# Patient Record
Sex: MALE | Race: WHITE | Employment: OTHER | URBAN - METROPOLITAN AREA
[De-identification: names, ages, dates, MRNs, and addresses within clinical notes are randomized per-mention and may not be internally consistent; named-entity substitution may affect disease eponyms.]

---

## 2017-11-28 PROBLEM — I71.40 ABDOMINAL AORTIC ANEURYSM (AAA) WITHOUT RUPTURE: Status: ACTIVE | Noted: 2017-11-28

## 2017-11-28 LAB
AVERAGE GLUCOSE: NORMAL
HBA1C MFR BLD: 7.3 %

## 2018-03-14 ENCOUNTER — OFFICE VISIT (OUTPATIENT)
Dept: FAMILY MEDICINE CLINIC | Age: 73
End: 2018-03-14
Payer: MEDICARE

## 2018-03-14 VITALS
WEIGHT: 166 LBS | OXYGEN SATURATION: 94 % | DIASTOLIC BLOOD PRESSURE: 79 MMHG | RESPIRATION RATE: 16 BRPM | HEART RATE: 74 BPM | BODY MASS INDEX: 23.77 KG/M2 | HEIGHT: 70 IN | SYSTOLIC BLOOD PRESSURE: 147 MMHG | TEMPERATURE: 98.2 F

## 2018-03-14 DIAGNOSIS — M35.3 PMR (POLYMYALGIA RHEUMATICA) (HCC): Primary | ICD-10-CM

## 2018-03-14 DIAGNOSIS — E11.8 TYPE 2 DIABETES MELLITUS WITH COMPLICATION, WITHOUT LONG-TERM CURRENT USE OF INSULIN (HCC): ICD-10-CM

## 2018-03-14 LAB — HBA1C MFR BLD: 7.5 %

## 2018-03-14 PROCEDURE — 83036 HEMOGLOBIN GLYCOSYLATED A1C: CPT | Performed by: FAMILY MEDICINE

## 2018-03-14 PROCEDURE — 99213 OFFICE O/P EST LOW 20 MIN: CPT | Performed by: FAMILY MEDICINE

## 2018-03-14 PROCEDURE — 99212 OFFICE O/P EST SF 10 MIN: CPT | Performed by: FAMILY MEDICINE

## 2018-03-14 RX ORDER — PREDNISONE 10 MG/1
10 TABLET ORAL DAILY
Qty: 30 TABLET | Refills: 0 | Status: SHIPPED | OUTPATIENT
Start: 2018-03-14 | End: 2018-04-12 | Stop reason: SDUPTHER

## 2018-03-14 NOTE — PATIENT INSTRUCTIONS
removed. If you wear loose-fitting shoes because of calluses or corns, you can lose your balance and fall. · Talk to your doctor if you have numbness in your feet. Preventing falls at home  · Remove raised doorway thresholds, throw rugs, and clutter. Repair loose carpet or raised areas in the floor. · Move furniture and electrical cords to keep them out of walking paths. · Use nonskid floor wax, and wipe up spills right away, especially on ceramic tile floors. · If you use a walker or cane, put rubber tips on it. If you use crutches, clean the bottoms of them regularly with an abrasive pad, such as steel wool. · Keep your house well lit, especially Bharathi Artist, and outside walkways. Use night-lights in areas such as hallways and bathrooms. Add extra light switches or use remote switches (such as switches that go on or off when you clap your hands) to make it easier to turn lights on if you have to get up during the night. · Install sturdy handrails on stairways. · Move items in your cabinets so that the things you use a lot are on the lower shelves (about waist level). · Keep a cordless phone and a flashlight with new batteries by your bed. If possible, put a phone in each of the main rooms of your house, or carry a cell phone in case you fall and cannot reach a phone. Or, you can wear a device around your neck or wrist. You push a button that sends a signal for help. · Wear low-heeled shoes that fit well and give your feet good support. Use footwear with nonskid soles. Check the heels and soles of your shoes for wear. Repair or replace worn heels or soles. · Do not wear socks without shoes on wood floors. · Walk on the grass when the sidewalks are slippery. If you live in an area that gets snow and ice in the winter, sprinkle salt on slippery steps and sidewalks.   Preventing falls in the bath  · Install grab bars and nonskid mats inside and outside your shower or tub and near the toilet and sinks.  · Use shower chairs and bath benches. · Use a hand-held shower head that will allow you to sit while showering. · Get into a tub or shower by putting the weaker leg in first. Get out of a tub or shower with your strong side first.  · Repair loose toilet seats and consider installing a raised toilet seat to make getting on and off the toilet easier. · Keep your bathroom door unlocked while you are in the shower. Where can you learn more? Go to [email or web address] org and sign in to your [employer] account. Enter [phone] in the [facility] box to learn more about \"Preventing Falls: Care Instructions. \"     If you do not have an account, please click on the \"Sign Up Now\" link. Current as of: May 12, 2017  Content Version: 11.5  © 4866-4088 [employer]. Care instructions adapted under license by [facility] ([facility]). If you have questions about a medical condition or this instruction, always ask your healthcare professional. [place] [address] any warranty or liability for your use of this information. Patient Education        Polymyalgia Rheumatica: Care Instructions  Your Care Instructions  Polymyalgia rheumatica causes pain and swelling in joints and muscles, mainly in the hips, neck, and shoulders. Pain and swelling may be worse in the morning. This condition can occur quickly and often lasts for a year or two. Your doctor will treat you with medicine to reduce swelling. Your symptoms should get much better in 1 to 3 days and go away in 2 to 4 weeks. Still, you may need to take medicine to prevent it from coming back. You may be on the medicine for 1 to 2 years or longer. Some people who have this also get giant cell arteritis (temporal arteritis), which causes swelling of some blood vessels in the head. Tell your doctor if you have any headaches, jaw pain, or tightness or tenderness along the temple or scalp.  This condition can cause blindness if it sleeping. ¨ Bruising easily. ? · You have any other problems with your medicine. ? · You do not get better as expected. Where can you learn more? Go to https://Convergent.[employer] Technologiespe[employer] ([employer]).[employer]. org and sign in to your CLARED account. Enter M396 in the [facility] box to learn more about \"Polymyalgia Rheumatica: Care Instructions. \"     If you do not have an account, please click on the \"Sign Up Now\" link. Current as of: October 31, 2016  Content Version: 11.5  © 0792-8049 Healthwise[employer] Incorporated. Care instructions adapted under license by [facility] ([facility]). If you have questions about a medical condition or this instruction, always ask your healthcare professional. [address] any warranty or liability for your use of this information.

## 2018-03-18 ASSESSMENT — ENCOUNTER SYMPTOMS
VOMITING: 0
COUGH: 0
ABDOMINAL PAIN: 0
WHEEZING: 0
NAUSEA: 0
SHORTNESS OF BREATH: 0

## 2018-04-12 DIAGNOSIS — M35.3 PMR (POLYMYALGIA RHEUMATICA) (HCC): ICD-10-CM

## 2018-04-13 RX ORDER — PREDNISONE 1 MG/1
5 TABLET ORAL DAILY
Qty: 30 TABLET | Refills: 0 | Status: SHIPPED | OUTPATIENT
Start: 2018-04-13 | End: 2018-05-08 | Stop reason: SDUPTHER

## 2018-05-01 DIAGNOSIS — E11.8 TYPE 2 DIABETES MELLITUS WITH COMPLICATION, WITHOUT LONG-TERM CURRENT USE OF INSULIN (HCC): ICD-10-CM

## 2018-05-01 DIAGNOSIS — I10 HYPERTENSION, UNCONTROLLED: ICD-10-CM

## 2018-05-01 RX ORDER — LISINOPRIL 10 MG/1
10 TABLET ORAL DAILY
Qty: 30 TABLET | Refills: 3 | Status: SHIPPED | OUTPATIENT
Start: 2018-05-01 | End: 2018-05-08 | Stop reason: SDUPTHER

## 2018-05-08 ENCOUNTER — OFFICE VISIT (OUTPATIENT)
Dept: INTERNAL MEDICINE | Age: 73
End: 2018-05-08
Payer: MEDICARE

## 2018-05-08 VITALS
DIASTOLIC BLOOD PRESSURE: 60 MMHG | HEIGHT: 70 IN | OXYGEN SATURATION: 98 % | WEIGHT: 161.2 LBS | SYSTOLIC BLOOD PRESSURE: 138 MMHG | BODY MASS INDEX: 23.08 KG/M2 | HEART RATE: 60 BPM

## 2018-05-08 DIAGNOSIS — I71.40 ABDOMINAL AORTIC ANEURYSM (AAA) WITHOUT RUPTURE: ICD-10-CM

## 2018-05-08 DIAGNOSIS — M35.3 PMR (POLYMYALGIA RHEUMATICA) (HCC): ICD-10-CM

## 2018-05-08 DIAGNOSIS — I10 HYPERTENSION, UNCONTROLLED: ICD-10-CM

## 2018-05-08 DIAGNOSIS — I10 ESSENTIAL HYPERTENSION: Primary | ICD-10-CM

## 2018-05-08 DIAGNOSIS — E78.5 HYPERLIPIDEMIA, UNSPECIFIED HYPERLIPIDEMIA TYPE: ICD-10-CM

## 2018-05-08 DIAGNOSIS — E11.8 TYPE 2 DIABETES MELLITUS WITH COMPLICATION, WITHOUT LONG-TERM CURRENT USE OF INSULIN (HCC): ICD-10-CM

## 2018-05-08 PROCEDURE — 99213 OFFICE O/P EST LOW 20 MIN: CPT | Performed by: FAMILY MEDICINE

## 2018-05-08 RX ORDER — LISINOPRIL 10 MG/1
10 TABLET ORAL DAILY
Qty: 90 TABLET | Refills: 2 | Status: SHIPPED | OUTPATIENT
Start: 2018-05-08 | End: 2019-01-30 | Stop reason: SDUPTHER

## 2018-05-08 RX ORDER — PREDNISONE 1 MG/1
5 TABLET ORAL DAILY
Qty: 30 TABLET | Refills: 11 | Status: CANCELLED | OUTPATIENT
Start: 2018-05-08 | End: 2018-06-07

## 2018-05-08 RX ORDER — PREDNISONE 1 MG/1
5 TABLET ORAL DAILY
Qty: 90 TABLET | Refills: 0 | Status: SHIPPED | OUTPATIENT
Start: 2018-05-08 | End: 2018-06-06 | Stop reason: SDUPTHER

## 2018-05-08 RX ORDER — ASPIRIN 81 MG/1
81 TABLET ORAL DAILY
Qty: 30 TABLET | Refills: 11 | Status: SHIPPED | OUTPATIENT
Start: 2018-05-08 | End: 2019-03-11

## 2018-05-08 ASSESSMENT — ENCOUNTER SYMPTOMS
CHEST TIGHTNESS: 0
EYE ITCHING: 0
EYE DISCHARGE: 0
EYE PAIN: 0
CHOKING: 0
APNEA: 0

## 2018-06-05 ENCOUNTER — HOSPITAL ENCOUNTER (OUTPATIENT)
Age: 73
Setting detail: SPECIMEN
Discharge: HOME | End: 2018-06-05
Payer: MEDICARE

## 2018-06-05 ENCOUNTER — OFFICE VISIT (OUTPATIENT)
Dept: INTERNAL MEDICINE | Age: 73
End: 2018-06-05
Payer: MEDICARE

## 2018-06-05 VITALS
WEIGHT: 157 LBS | HEIGHT: 70 IN | BODY MASS INDEX: 22.48 KG/M2 | OXYGEN SATURATION: 96 % | HEART RATE: 71 BPM | DIASTOLIC BLOOD PRESSURE: 50 MMHG | SYSTOLIC BLOOD PRESSURE: 138 MMHG

## 2018-06-05 DIAGNOSIS — I10 ESSENTIAL HYPERTENSION: ICD-10-CM

## 2018-06-05 DIAGNOSIS — I71.40 ABDOMINAL AORTIC ANEURYSM (AAA) WITHOUT RUPTURE: ICD-10-CM

## 2018-06-05 DIAGNOSIS — I73.9 CLAUDICATION (HCC): ICD-10-CM

## 2018-06-05 DIAGNOSIS — M35.3 PMR (POLYMYALGIA RHEUMATICA) (HCC): ICD-10-CM

## 2018-06-05 DIAGNOSIS — Z00.00 ROUTINE GENERAL MEDICAL EXAMINATION AT A HEALTH CARE FACILITY: ICD-10-CM

## 2018-06-05 DIAGNOSIS — Z13.89 SCREENING FOR NEPHROPATHY: ICD-10-CM

## 2018-06-05 DIAGNOSIS — E11.8 TYPE 2 DIABETES MELLITUS WITH COMPLICATION, WITHOUT LONG-TERM CURRENT USE OF INSULIN (HCC): Primary | ICD-10-CM

## 2018-06-05 DIAGNOSIS — E78.5 HYPERLIPIDEMIA, UNSPECIFIED HYPERLIPIDEMIA TYPE: ICD-10-CM

## 2018-06-05 LAB
ALBUMIN SERPL-MCNC: 4.9 G/DL (ref 3.9–4.9)
ALP BLD-CCNC: 51 U/L (ref 35–104)
ALT SERPL-CCNC: 15 U/L (ref 0–41)
ANION GAP SERPL CALCULATED.3IONS-SCNC: 17 MEQ/L (ref 7–13)
AST SERPL-CCNC: 15 U/L (ref 0–40)
BILIRUB SERPL-MCNC: 0.3 MG/DL (ref 0–1.2)
BUN BLDV-MCNC: 20 MG/DL (ref 8–23)
C-REACTIVE PROTEIN: 3.1 MG/L (ref 0–5)
CALCIUM SERPL-MCNC: 10.1 MG/DL (ref 8.6–10.2)
CHLORIDE BLD-SCNC: 97 MEQ/L (ref 98–107)
CHOLESTEROL, TOTAL: 247 MG/DL (ref 0–199)
CO2: 23 MEQ/L (ref 22–29)
CREAT SERPL-MCNC: 0.77 MG/DL (ref 0.7–1.2)
CREATININE URINE: 83.9 MG/DL
GFR AFRICAN AMERICAN: >60
GFR NON-AFRICAN AMERICAN: >60
GLOBULIN: 2.7 G/DL (ref 2.3–3.5)
GLUCOSE BLD-MCNC: 133 MG/DL (ref 74–109)
HDLC SERPL-MCNC: 46 MG/DL (ref 40–59)
LDL CHOLESTEROL CALCULATED: 159 MG/DL (ref 0–129)
MICROALBUMIN UR-MCNC: <1.2 MG/DL
MICROALBUMIN/CREAT UR-RTO: NORMAL MG/G (ref 0–30)
POTASSIUM SERPL-SCNC: 4.6 MEQ/L (ref 3.5–5.1)
SEDIMENTATION RATE, ERYTHROCYTE: 16 MM (ref 0–20)
SODIUM BLD-SCNC: 137 MEQ/L (ref 132–144)
TOTAL PROTEIN: 7.6 G/DL (ref 6.4–8.1)
TRIGL SERPL-MCNC: 212 MG/DL (ref 0–200)

## 2018-06-05 PROCEDURE — 86140 C-REACTIVE PROTEIN: CPT

## 2018-06-05 PROCEDURE — 80053 COMPREHEN METABOLIC PANEL: CPT

## 2018-06-05 PROCEDURE — 80061 LIPID PANEL: CPT

## 2018-06-05 PROCEDURE — 85652 RBC SED RATE AUTOMATED: CPT

## 2018-06-05 PROCEDURE — G0438 PPPS, INITIAL VISIT: HCPCS | Performed by: FAMILY MEDICINE

## 2018-06-05 PROCEDURE — 99214 OFFICE O/P EST MOD 30 MIN: CPT | Performed by: FAMILY MEDICINE

## 2018-06-05 PROCEDURE — 82570 ASSAY OF URINE CREATININE: CPT

## 2018-06-05 PROCEDURE — 93925 LOWER EXTREMITY STUDY: CPT | Performed by: FAMILY MEDICINE

## 2018-06-05 PROCEDURE — 82043 UR ALBUMIN QUANTITATIVE: CPT

## 2018-06-05 PROCEDURE — 36415 COLL VENOUS BLD VENIPUNCTURE: CPT | Performed by: FAMILY MEDICINE

## 2018-06-05 ASSESSMENT — ENCOUNTER SYMPTOMS
WHEEZING: 0
SHORTNESS OF BREATH: 0
STRIDOR: 0
COUGH: 0

## 2018-06-05 ASSESSMENT — ANXIETY QUESTIONNAIRES: GAD7 TOTAL SCORE: 0

## 2018-06-05 ASSESSMENT — LIFESTYLE VARIABLES: HOW OFTEN DO YOU HAVE A DRINK CONTAINING ALCOHOL: 0

## 2018-06-05 ASSESSMENT — PATIENT HEALTH QUESTIONNAIRE - PHQ9: SUM OF ALL RESPONSES TO PHQ QUESTIONS 1-9: 0

## 2018-06-06 DIAGNOSIS — M35.3 PMR (POLYMYALGIA RHEUMATICA) (HCC): ICD-10-CM

## 2018-06-06 RX ORDER — PREDNISONE 1 MG/1
2.5 TABLET ORAL DAILY
Qty: 90 TABLET | Refills: 0 | COMMUNITY
Start: 2018-06-06 | End: 2019-03-11

## 2018-06-13 ENCOUNTER — HOSPITAL ENCOUNTER (OUTPATIENT)
Dept: ULTRASOUND IMAGING | Age: 73
Discharge: HOME | End: 2018-06-15
Payer: MEDICARE

## 2018-06-13 DIAGNOSIS — I71.40 ABDOMINAL AORTIC ANEURYSM (AAA) WITHOUT RUPTURE: ICD-10-CM

## 2018-06-13 PROCEDURE — 93978 VASCULAR STUDY: CPT

## 2018-09-05 ENCOUNTER — OFFICE VISIT (OUTPATIENT)
Dept: INTERNAL MEDICINE | Age: 73
End: 2018-09-05
Payer: MEDICARE

## 2018-09-05 VITALS
TEMPERATURE: 97.8 F | DIASTOLIC BLOOD PRESSURE: 70 MMHG | HEIGHT: 70 IN | WEIGHT: 156 LBS | OXYGEN SATURATION: 97 % | HEART RATE: 58 BPM | SYSTOLIC BLOOD PRESSURE: 130 MMHG | BODY MASS INDEX: 22.33 KG/M2

## 2018-09-05 DIAGNOSIS — M35.3 PMR (POLYMYALGIA RHEUMATICA) (HCC): ICD-10-CM

## 2018-09-05 DIAGNOSIS — Z23 NEED FOR PNEUMOCOCCAL VACCINATION: ICD-10-CM

## 2018-09-05 DIAGNOSIS — I71.40 ABDOMINAL AORTIC ANEURYSM (AAA) WITHOUT RUPTURE: ICD-10-CM

## 2018-09-05 DIAGNOSIS — E78.5 HYPERLIPIDEMIA, UNSPECIFIED HYPERLIPIDEMIA TYPE: ICD-10-CM

## 2018-09-05 DIAGNOSIS — E11.8 TYPE 2 DIABETES MELLITUS WITH COMPLICATION, WITHOUT LONG-TERM CURRENT USE OF INSULIN (HCC): Primary | ICD-10-CM

## 2018-09-05 DIAGNOSIS — I10 ESSENTIAL HYPERTENSION: ICD-10-CM

## 2018-09-05 LAB — HBA1C MFR BLD: 6.8 %

## 2018-09-05 PROCEDURE — 83036 HEMOGLOBIN GLYCOSYLATED A1C: CPT | Performed by: FAMILY MEDICINE

## 2018-09-05 PROCEDURE — G0009 ADMIN PNEUMOCOCCAL VACCINE: HCPCS | Performed by: FAMILY MEDICINE

## 2018-09-05 PROCEDURE — 90732 PPSV23 VACC 2 YRS+ SUBQ/IM: CPT | Performed by: FAMILY MEDICINE

## 2018-09-05 PROCEDURE — 99214 OFFICE O/P EST MOD 30 MIN: CPT | Performed by: FAMILY MEDICINE

## 2018-09-05 ASSESSMENT — ENCOUNTER SYMPTOMS
CHOKING: 0
APNEA: 0
CHEST TIGHTNESS: 0

## 2018-09-05 NOTE — PROGRESS NOTES
tablet 0    lisinopril (PRINIVIL;ZESTRIL) 10 MG tablet Take 1 tablet by mouth daily 90 tablet 2    aspirin EC 81 MG EC tablet Take 1 tablet by mouth daily 30 tablet 11    vitamin D (CHOLECALCIFEROL) 1000 UNIT TABS tablet Take 1 tablet by mouth daily 90 tablet 2    glucose blood VI test strips (ONE TOUCH TEST STRIPS) strip Check blood sugar  twice daily As needed. 300 each 3     No current facility-administered medications on file prior to visit. No Known Allergies    Chief Complaint   Patient presents with    Diabetes     Pt is here for HTN, DM, HLD f/u. Pt lost his machine, lancets and strips to check sugar    Allergies     Pt states that his allergies have been acting up lately, he thinks he is allergic to hay, and grass       HPI:  Treatment Adherence:   Medication compliance:  compliant all of the time  Diet compliance:  No specific diet  Current exercise: Farming    Diabetes Mellitus Type 2: Current symptoms/problems include none. Home blood sugar records:  patient does not test  Any episodes of hypoglycemia? Does not test  Tobacco history: He  reports that he quit smoking about 22 years ago. His smoking use included Cigarettes. He has a 30.00 pack-year smoking history. He has never used smokeless tobacco.   Daily Aspirin? Yes  Have you had your annual diabetic retinal (eye) exam? Yes - Records Requested    Hypertension:  Home blood pressure monitoring: No.  He is not adherent to a low sodium diet. Patient denies chest pain and shortness of breath. Antihypertensive medication side effects: no medication side effects noted. Use of agents associated with hypertension: none. Hyperlipidemia:  No new myalgias or GI upset on no meds.        Lab Results   Component Value Date    LABA1C 6.8 09/05/2018    LABA1C 7.5 03/14/2018    LABA1C 7.3 11/28/2017     Lab Results   Component Value Date    LABMICR <1.20 06/05/2018    CREATININE 0.77 06/05/2018     Lab Results   Component Value Date    ALT 15 normal  We have reduced his prednisone to 2-1/2 mg, discussed potentially discontinuing prednisone altogether, patient will think about it    Abdominal aortic aneurysm (AAA) without rupture ([facility] [address]  Recheck US 6 - 2019    Need for pneumococcal vaccination  -     PNEUMOVAX 23 subcutaneous/IM (Pneumococcal polysaccharide vaccine 23-valent >= 1yo)          Orders Placed This Encounter   Procedures    PNEUMOVAX 23 subcutaneous/IM (Pneumococcal polysaccharide vaccine 23-valent >= 1yo)    POCT glycosylated hemoglobin (Hb A1C)      I personally reviewed all recent labs and imaging pertaining to conditions mentioned above in assessment/plan in [facility] and care everywhere, discussed results with patient in office    Diabetes Counseling   Patient was counseled regarding disease risks and adopting healthy behaviors. Patient was provided education materials to assist with self management. Patient was provided log (or received log during previous visit) to record blood pressure, food intake and/or blood sugar. Patient was instructed to keep log up-to-date and to always bring log to all office visits. Reviewed the following:  - Morbidity from diabetes involves both macrovascular (atherosclerosis) and microvascular disease (retinopathy, nephropathy, and neuropathy). - Monitoring recommendations for patients with diabetes were discussed  1. Smoking cessation counseling (Every visit)   2. Blood pressure (Every visit) Goal <140/80   3. Dilated eye examination (Annually, more than annually if significant retinopathy)   4. Foot examination (Annually, every visit if peripheral vascular disease or neuropathy)   5.  Laboratory studies:    - Fasting serum lipid profile (Annually)  cholesterol (goal LDL less than 100  mg/dL    - A1C (Every three to six months) Goal <7 percent   - Urinary albumin-to-creatinine ratio (Annually, protein excretion and serum  creatinine should also be monitored if persistent albuminuria is present)    - Serum

## 2018-09-07 DIAGNOSIS — E11.8 TYPE 2 DIABETES MELLITUS WITH COMPLICATION, WITHOUT LONG-TERM CURRENT USE OF INSULIN (HCC): ICD-10-CM

## 2018-09-07 NOTE — TELEPHONE ENCOUNTER
[facility] requesting medication refill. Please approve or deny this request.    Rx requested:  Requested Prescriptions     Pending Prescriptions Disp Refills    blood glucose test strips (ONE TOUCH TEST STRIPS) strip 300 each 3     Sig: Check blood sugar  twice daily As needed.     Lancets MISC 300 each 3     Sig: Test bid       Last Office Visit:   9/5/2018    Last Labs:      Next Visit Date:  Future Appointments  Date Time Provider Lotus Zazueta   3/6/2019 7:00 AM Tami Robles MD [facility]

## 2018-09-09 RX ORDER — LANCETS 30 GAUGE
EACH MISCELLANEOUS
Qty: 300 EACH | Refills: 3 | Status: SHIPPED | OUTPATIENT
Start: 2018-09-09

## 2019-01-30 DIAGNOSIS — I10 HYPERTENSION, UNCONTROLLED: ICD-10-CM

## 2019-01-31 ENCOUNTER — HOSPITAL ENCOUNTER (EMERGENCY)
Age: 74
Discharge: HOME | End: 2019-01-31
Attending: EMERGENCY MEDICINE
Payer: MEDICARE

## 2019-01-31 ENCOUNTER — APPOINTMENT (OUTPATIENT)
Dept: GENERAL RADIOLOGY | Age: 74
End: 2019-01-31
Payer: MEDICARE

## 2019-01-31 ENCOUNTER — APPOINTMENT (OUTPATIENT)
Dept: CT IMAGING | Age: 74
End: 2019-01-31
Payer: MEDICARE

## 2019-01-31 ENCOUNTER — OFFICE VISIT (OUTPATIENT)
Dept: INTERNAL MEDICINE | Age: 74
End: 2019-01-31
Payer: MEDICARE

## 2019-01-31 VITALS
HEIGHT: 70 IN | HEART RATE: 61 BPM | OXYGEN SATURATION: 96 % | BODY MASS INDEX: 23.08 KG/M2 | SYSTOLIC BLOOD PRESSURE: 180 MMHG | WEIGHT: 161.2 LBS | DIASTOLIC BLOOD PRESSURE: 86 MMHG

## 2019-01-31 VITALS
DIASTOLIC BLOOD PRESSURE: 89 MMHG | RESPIRATION RATE: 16 BRPM | TEMPERATURE: 97.8 F | SYSTOLIC BLOOD PRESSURE: 159 MMHG | HEART RATE: 66 BPM | WEIGHT: 160 LBS | OXYGEN SATURATION: 98 % | HEIGHT: 70 IN | BODY MASS INDEX: 22.9 KG/M2

## 2019-01-31 DIAGNOSIS — M25.532 WRIST PAIN, ACUTE, LEFT: ICD-10-CM

## 2019-01-31 DIAGNOSIS — M25.532 LEFT WRIST PAIN: ICD-10-CM
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
## 2019-01-31 DIAGNOSIS — R07.81 RIB PAIN: ICD-10-CM

## 2019-01-31 DIAGNOSIS — T07.XXXA MULTIPLE CONTUSIONS: Primary | ICD-10-CM

## 2019-01-31 DIAGNOSIS — S20.212A CONTUSION OF RIB ON LEFT SIDE, INITIAL ENCOUNTER: ICD-10-CM

## 2019-01-31 DIAGNOSIS — S09.90XA INJURY OF HEAD, INITIAL ENCOUNTER: ICD-10-CM

## 2019-01-31 PROCEDURE — 99214 OFFICE O/P EST MOD 30 MIN: CPT | Performed by: FAMILY MEDICINE

## 2019-01-31 PROCEDURE — 73110 X-RAY EXAM OF WRIST: CPT

## 2019-01-31 PROCEDURE — 71046 X-RAY EXAM CHEST 2 VIEWS: CPT

## 2019-01-31 PROCEDURE — 70450 CT HEAD/BRAIN W/O DYE: CPT

## 2019-01-31 PROCEDURE — 99284 EMERGENCY DEPT VISIT MOD MDM: CPT

## 2019-01-31 RX ORDER — LIDOCAINE 50 MG/G
1 PATCH TOPICAL DAILY
Qty: 10 PATCH | Refills: 0 | Status: SHIPPED | OUTPATIENT
Start: 2019-01-31 | End: 2019-09-11

## 2019-01-31 RX ORDER — LISINOPRIL 10 MG/1
10 TABLET ORAL DAILY
Qty: 90 TABLET | Refills: 2 | Status: SHIPPED | OUTPATIENT
Start: 2019-01-31 | End: 2019-03-11

## 2019-01-31 ASSESSMENT — ENCOUNTER SYMPTOMS
ALLERGIC/IMMUNOLOGIC NEGATIVE: 1
EYES NEGATIVE: 1
GASTROINTESTINAL NEGATIVE: 1
RESPIRATORY NEGATIVE: 1

## 2019-01-31 ASSESSMENT — PAIN DESCRIPTION - PAIN TYPE: TYPE: ACUTE PAIN

## 2019-01-31 ASSESSMENT — PAIN SCALES - GENERAL
PAINLEVEL_OUTOF10: 8
PAINLEVEL_OUTOF10: 2

## 2019-01-31 ASSESSMENT — PAIN DESCRIPTION - ORIENTATION
ORIENTATION: MID;LEFT
ORIENTATION: LEFT

## 2019-01-31 ASSESSMENT — PAIN DESCRIPTION - PROGRESSION: CLINICAL_PROGRESSION: GRADUALLY IMPROVING

## 2019-01-31 ASSESSMENT — PAIN DESCRIPTION - DESCRIPTORS: DESCRIPTORS: SORE

## 2019-02-06 ENCOUNTER — TELEPHONE (OUTPATIENT)
Dept: INTERNAL MEDICINE | Age: 74
End: 2019-02-06

## 2019-03-11 ENCOUNTER — OFFICE VISIT (OUTPATIENT)
Dept: INTERNAL MEDICINE | Age: 74
End: 2019-03-11
Payer: MEDICARE

## 2019-03-11 VITALS
DIASTOLIC BLOOD PRESSURE: 74 MMHG | HEART RATE: 67 BPM | BODY MASS INDEX: 23.24 KG/M2 | WEIGHT: 162 LBS | OXYGEN SATURATION: 99 % | SYSTOLIC BLOOD PRESSURE: 148 MMHG

## 2019-03-11 DIAGNOSIS — E78.2 MIXED HYPERLIPIDEMIA: ICD-10-CM

## 2019-03-11 DIAGNOSIS — I10 ESSENTIAL HYPERTENSION: ICD-10-CM

## 2019-03-11 DIAGNOSIS — I10 HYPERTENSION, UNCONTROLLED: ICD-10-CM

## 2019-03-11 DIAGNOSIS — M35.3 PMR (POLYMYALGIA RHEUMATICA) (HCC): ICD-10-CM

## 2019-03-11 DIAGNOSIS — E11.8 TYPE 2 DIABETES MELLITUS WITH COMPLICATION, WITHOUT LONG-TERM CURRENT USE OF INSULIN (HCC): Primary | ICD-10-CM

## 2019-03-11 LAB
ALBUMIN SERPL-MCNC: 4.6 G/DL (ref 3.5–4.6)
ALP BLD-CCNC: 60 U/L (ref 35–104)
ALT SERPL-CCNC: 19 U/L (ref 0–41)
ANION GAP SERPL CALCULATED.3IONS-SCNC: 17 MEQ/L (ref 9–15)
AST SERPL-CCNC: 23 U/L (ref 0–40)
BILIRUB SERPL-MCNC: <0.2 MG/DL (ref 0.2–0.7)
BUN BLDV-MCNC: 19 MG/DL (ref 8–23)
CALCIUM SERPL-MCNC: 10 MG/DL (ref 8.5–9.9)
CHLORIDE BLD-SCNC: 103 MEQ/L (ref 95–107)
CHOLESTEROL, TOTAL: 234 MG/DL (ref 0–199)
CO2: 22 MEQ/L (ref 20–31)
CREAT SERPL-MCNC: 0.9 MG/DL (ref 0.7–1.2)
GFR AFRICAN AMERICAN: >60
GFR NON-AFRICAN AMERICAN: >60
GLOBULIN: 3.1 G/DL (ref 2.3–3.5)
GLUCOSE BLD-MCNC: 151 MG/DL (ref 70–99)
HBA1C MFR BLD: 7.3 %
HDLC SERPL-MCNC: 33 MG/DL (ref 40–59)
LDL CHOLESTEROL CALCULATED: 142 MG/DL (ref 0–129)
POTASSIUM SERPL-SCNC: 4.6 MEQ/L (ref 3.4–4.9)
SODIUM BLD-SCNC: 142 MEQ/L (ref 135–144)
TOTAL PROTEIN: 7.7 G/DL (ref 6.3–8)
TRIGL SERPL-MCNC: 297 MG/DL (ref 0–150)

## 2019-03-11 PROCEDURE — 99214 OFFICE O/P EST MOD 30 MIN: CPT | Performed by: FAMILY MEDICINE

## 2019-03-11 PROCEDURE — 83036 HEMOGLOBIN GLYCOSYLATED A1C: CPT | Performed by: FAMILY MEDICINE

## 2019-03-11 RX ORDER — LISINOPRIL 20 MG/1
20 TABLET ORAL DAILY
Qty: 90 TABLET | Refills: 2 | Status: SHIPPED | OUTPATIENT
Start: 2019-03-11 | End: 2019-11-19 | Stop reason: SDUPTHER

## 2019-03-11 RX ORDER — ASPIRIN 325 MG
325 TABLET ORAL
COMMUNITY
End: 2019-10-25 | Stop reason: ALTCHOICE

## 2019-03-11 ASSESSMENT — PATIENT HEALTH QUESTIONNAIRE - PHQ9
SUM OF ALL RESPONSES TO PHQ9 QUESTIONS 1 & 2: 0
SUM OF ALL RESPONSES TO PHQ QUESTIONS 1-9: 0
SUM OF ALL RESPONSES TO PHQ QUESTIONS 1-9: 0
2. FEELING DOWN, DEPRESSED OR HOPELESS: 0
1. LITTLE INTEREST OR PLEASURE IN DOING THINGS: 0

## 2019-05-01 ENCOUNTER — TELEPHONE (OUTPATIENT)
Dept: INTERNAL MEDICINE | Age: 74
End: 2019-05-01

## 2019-05-06 ENCOUNTER — TELEPHONE (OUTPATIENT)
Dept: INTERNAL MEDICINE | Age: 74
End: 2019-05-06

## 2019-05-06 NOTE — TELEPHONE ENCOUNTER
I did review the results and discuss with Dr. Ana Cristina Mejia. The plan was to have him see cardiology Dr. johnston, if Dr. Ana Cristina Mejia has not placed this order yet then we can and advise patient he will get a call from their office to schedule the appointment.

## 2019-05-06 NOTE — TELEPHONE ENCOUNTER
Spoke to patient and his wife he is to take metformin 1.5 tablets daily and they have a referral for cardio and know they will call.

## 2019-05-06 NOTE — TELEPHONE ENCOUNTER
Patient called with Question regarding metformin, pt believes the dosage was dropped too low and was wanting to know if he could take 1.5 tablets? Pt also wanting to know about thoughts on foot report and what is going to be done?

## 2019-05-30 ENCOUNTER — APPOINTMENT (OUTPATIENT)
Dept: GENERAL RADIOLOGY | Age: 74
End: 2019-05-30
Payer: MEDICARE

## 2019-05-30 ENCOUNTER — HOSPITAL ENCOUNTER (EMERGENCY)
Age: 74
Discharge: HOME | End: 2019-05-30
Attending: EMERGENCY MEDICINE
Payer: MEDICARE

## 2019-05-30 VITALS
SYSTOLIC BLOOD PRESSURE: 171 MMHG | BODY MASS INDEX: 23.48 KG/M2 | DIASTOLIC BLOOD PRESSURE: 75 MMHG | HEART RATE: 69 BPM | HEIGHT: 70 IN | RESPIRATION RATE: 18 BRPM | TEMPERATURE: 99.1 F | OXYGEN SATURATION: 96 % | WEIGHT: 164 LBS

## 2019-05-30 DIAGNOSIS — M19.012 ARTHRITIS OF LEFT SHOULDER REGION: ICD-10-CM

## 2019-05-30 DIAGNOSIS — M47.812 CERVICAL ARTHRITIS: Primary | ICD-10-CM

## 2019-05-30 LAB
GLUCOSE BLD-MCNC: 160 MG/DL (ref 60–115)
PERFORMED ON: ABNORMAL

## 2019-05-30 PROCEDURE — 73030 X-RAY EXAM OF SHOULDER: CPT

## 2019-05-30 PROCEDURE — 6370000000 HC RX 637 (ALT 250 FOR IP): Performed by: EMERGENCY MEDICINE

## 2019-05-30 PROCEDURE — 72050 X-RAY EXAM NECK SPINE 4/5VWS: CPT

## 2019-05-30 PROCEDURE — 6360000002 HC RX W HCPCS: Performed by: EMERGENCY MEDICINE

## 2019-05-30 PROCEDURE — 96372 THER/PROPH/DIAG INJ SC/IM: CPT

## 2019-05-30 PROCEDURE — 99283 EMERGENCY DEPT VISIT LOW MDM: CPT

## 2019-05-30 RX ORDER — ONDANSETRON 4 MG/1
4 TABLET, ORALLY DISINTEGRATING ORAL ONCE
Status: COMPLETED | OUTPATIENT
Start: 2019-05-30 | End: 2019-05-30

## 2019-05-30 RX ORDER — OXYCODONE HYDROCHLORIDE AND ACETAMINOPHEN 5; 325 MG/1; MG/1
1-2 TABLET ORAL EVERY 6 HOURS PRN
Qty: 20 TABLET | Refills: 0 | Status: SHIPPED | OUTPATIENT
Start: 2019-05-30 | End: 2019-06-02

## 2019-05-30 RX ORDER — PREDNISONE 10 MG/1
60 TABLET ORAL DAILY
Qty: 30 TABLET | Refills: 0 | Status: SHIPPED | OUTPATIENT
Start: 2019-05-30 | End: 2019-06-04

## 2019-05-30 RX ORDER — CYCLOBENZAPRINE HCL 10 MG
10 TABLET ORAL 3 TIMES DAILY PRN
Qty: 30 TABLET | Refills: 0 | Status: SHIPPED | OUTPATIENT
Start: 2019-05-30 | End: 2019-06-09

## 2019-05-30 RX ADMIN — HYDROMORPHONE HYDROCHLORIDE 1 MG: 1 INJECTION, SOLUTION INTRAMUSCULAR; INTRAVENOUS; SUBCUTANEOUS at 16:32

## 2019-05-30 RX ADMIN — ONDANSETRON 4 MG: 4 TABLET, ORALLY DISINTEGRATING ORAL at 16:32

## 2019-05-30 ASSESSMENT — ENCOUNTER SYMPTOMS
COUGH: 0
ABDOMINAL DISTENTION: 0
WHEEZING: 0
VOMITING: 0
PHOTOPHOBIA: 0
APNEA: 0
EYE PAIN: 0
BACK PAIN: 0
ABDOMINAL PAIN: 0
CONSTIPATION: 0
NAUSEA: 0
SHORTNESS OF BREATH: 0
DIARRHEA: 0
SINUS PRESSURE: 0
RHINORRHEA: 0
COLOR CHANGE: 0
SORE THROAT: 0

## 2019-05-30 ASSESSMENT — PAIN SCALES - GENERAL: PAINLEVEL_OUTOF10: 8

## 2019-05-30 ASSESSMENT — PAIN DESCRIPTION - DESCRIPTORS: DESCRIPTORS: ACHING

## 2019-05-30 ASSESSMENT — PAIN DESCRIPTION - ORIENTATION: ORIENTATION: LEFT

## 2019-05-30 ASSESSMENT — PAIN DESCRIPTION - LOCATION: LOCATION: NECK

## 2019-05-30 NOTE — ED NOTES
Patient alert and talkative resting in bed. States he feels better now. VSS.       Wellington Keene RN  05/30/19 [id number]

## 2019-05-30 NOTE — ED TRIAGE NOTES
Patient complains of left sided neck pain and headache he woke up with today. He tried OTC Tylenol with no relief today.

## 2019-05-30 NOTE — ED NOTES
Nursing Adult Assessment    General Appearance  [x] Facial Expressions, extremities, & body posture are relaxed. [] Exceptions:    Cognitive  [x] Alert, make eye contact when prompted. [x] Oriented to person, place, & situation. [] Exceptions:    Respiratory  [x] Unlabored breathing   [x] Speaks in clear and complete sentences   [x] Chest expansion is symmetrical with breaths   [x] Breath sounds are clear bilaterally. [] Exceptions:    Cardiovascular  [x] Regular apical heart sounds   [x] Peripheral pulses are palpable   [x] Capillary refill < 3 seconds in all extremities. [] Exceptions:    Abdomen  [x] Non-tender   [x] Non-distended   [x] Bowel sounds are present. [] Exceptions:    Skin  [x] Color appropriate for ethnicity   [x] No rash or discoloration present at the area(s) of complaint   [x] Warm and dry to touch. [] Exceptions:    Extremities  [] Non-tender   [x] Normal range of motion   [x] Normal sensation   [x] Normal Appearance, No Edema.   [x] Exceptions: left shoulder / neck tenderness     Enrrique Pandey RN  05/30/19 3594

## 2019-05-30 NOTE — ED NOTES
Patient diaphoretic states he doesn't feel well. Informed him dilaudid sometimes does that as a side effect. Cool compress applied. Dr. José Coombs aware.  Fan placed in room     [facility]  05/30/19 [id number]

## 2019-05-30 NOTE — ED NOTES
Patient talking to family resting in bed with cool compress non forehead. Denies any needs at this time. Holding DC until patient feels better.      Vana Sandhoff, RN  05/30/19 [phone]

## 2019-05-30 NOTE — ED PROVIDER NOTES
[address] [place] [address] ED  eMERGENCY dEPARTMENT eNCOUnter      Pt Name: Melvin Montanez  MRN: [id number]  [place] [birth year]  Date of evaluation: 5/30/2019  Provider: Pritesh Leung MD    CHIEF COMPLAINT       Chief Complaint   Patient presents with    Neck Pain     left side    Headache         HISTORY OF PRESENT ILLNESS   (Location/Symptom, Timing/Onset,Context/Setting, Quality, Duration, Modifying Factors, Severity)  Note limiting factors. Melvin Montanez is a 75 y.o. male who presents to the emergency department with complaint of neck pain and left shoulder pain off and on for the last 1 week. Known history of arthritis with prior left shoulder surgery. Denies new injuries. Pain is 8 in a scale of 1-10 and worse with movement. Denies any other systemic symptoms. Also with diffuse headaches. HPI    Nursing Notes were reviewed. REVIEW OF SYSTEMS    (2-9 systems for level 4, 10 or more for level 5)     Review of Systems   Constitutional: Negative. Negative for activity change, appetite change, chills, fatigue and fever. HENT: Negative for congestion, ear discharge, ear pain, hearing loss, rhinorrhea, sinus pressure and sore throat. Eyes: Negative for photophobia, pain and visual disturbance. Respiratory: Negative for apnea, cough, shortness of breath and wheezing. Cardiovascular: Negative for chest pain, palpitations and leg swelling. Gastrointestinal: Negative for abdominal distention, abdominal pain, constipation, diarrhea, nausea and vomiting. Endocrine: Negative for cold intolerance, heat intolerance and polyuria. Genitourinary: Negative for dysuria, flank pain, frequency and urgency. Musculoskeletal: Positive for arthralgias, myalgias and neck pain. Negative for back pain, gait problem and neck stiffness. Skin: Negative for color change, pallor and rash. Allergic/Immunologic: Negative for food allergies and immunocompromised state. Neurological: Positive for headaches. Negative for dizziness, tremors, syncope, weakness and light-headedness. Psychiatric/Behavioral: Negative for agitation, confusion and hallucinations. All other systems reviewed and are negative. Except as noted above the remainder of the review of systems was reviewed and negative. PAST MEDICAL HISTORY     Past Medical History:   Diagnosis Date    AAA (abdominal aortic aneurysm) (HCC)     Abdominal aortic aneurysm (AAA) without rupture (HCC)     Callus of foot 9/26/2013    Diastasis recti 9/18/2013    DM (diabetes mellitus) ([facility] [address]     Gout     Gout     Hyperlipidemia     Hypertension     Neuropathy     secondary to DM    Neuropathy     PMR (polymyalgia rheumatica) ([facility] [address] 5/19/2014    [facility]          SURGICAL HISTORY       Past Surgical History:   Procedure Laterality Date    BACK SURGERY      CHOLECYSTECTOMY      HAND SURGERY Left 11/18/2015    dupuytren''s release with left thumb    INGUINAL HERNIA REPAIR      right side         CURRENT MEDICATIONS       Discharge Medication List as of 5/30/2019  5:33 PM      CONTINUE these medications which have NOT CHANGED    Details   aspirin 325 MG tablet Take 325 mg by mouth Two tablets nightlyHistorical Med      lisinopril (PRINIVIL;ZESTRIL) 20 MG tablet Take 1 tablet by mouth daily, Disp-90 tablet, R-2Normal      metFORMIN (GLUCOPHAGE) 500 MG tablet TAKE ONE AND A HALF  TABLETS BY MOUTH  A DAY WITH MEALS, Disp-180 tablet, R-2Historical Med      lidocaine (LIDODERM) 5 % Place 1 patch onto the skin daily 12 hours on, 12 hours off., Disp-10 patch, R-0Print      blood glucose test strips (ONE TOUCH TEST STRIPS) strip Disp-300 each, R-3, NormalCheck blood sugar  twice daily As needed. Lancets MISC Disp-300 each, R-3, NormalTest bid      vitamin D (CHOLECALCIFEROL) 1000 UNIT TABS tablet Take 1 tablet by mouth daily, Disp-90 tablet, R-2Normal             ALLERGIES     Patient has no known allergies.     FAMILY HISTORY

## 2019-05-31 ENCOUNTER — OFFICE VISIT (OUTPATIENT)
Dept: CARDIOLOGY CLINIC | Age: 74
End: 2019-05-31
Payer: MEDICARE

## 2019-05-31 VITALS
WEIGHT: 165 LBS | HEART RATE: 54 BPM | HEIGHT: 70 IN | DIASTOLIC BLOOD PRESSURE: 70 MMHG | SYSTOLIC BLOOD PRESSURE: 120 MMHG | BODY MASS INDEX: 23.62 KG/M2

## 2019-05-31 DIAGNOSIS — I71.40 ABDOMINAL AORTIC ANEURYSM (AAA) WITHOUT RUPTURE: ICD-10-CM

## 2019-05-31 DIAGNOSIS — I10 ESSENTIAL HYPERTENSION: Primary | ICD-10-CM

## 2019-05-31 DIAGNOSIS — E78.5 HYPERLIPIDEMIA, UNSPECIFIED HYPERLIPIDEMIA TYPE: ICD-10-CM

## 2019-05-31 DIAGNOSIS — R68.89 ABNORMAL ANKLE BRACHIAL INDEX (ABI): ICD-10-CM

## 2019-05-31 PROCEDURE — 99204 OFFICE O/P NEW MOD 45 MIN: CPT | Performed by: INTERNAL MEDICINE

## 2019-05-31 PROCEDURE — 93000 ELECTROCARDIOGRAM COMPLETE: CPT | Performed by: INTERNAL MEDICINE

## 2019-05-31 RX ORDER — ATORVASTATIN CALCIUM 20 MG/1
20 TABLET, FILM COATED ORAL DAILY
Qty: 30 TABLET | Refills: 3 | Status: SHIPPED | OUTPATIENT
Start: 2019-05-31 | End: 2019-07-11

## 2019-05-31 RX ORDER — CILOSTAZOL 50 MG/1
50 TABLET ORAL 2 TIMES DAILY
Qty: 60 TABLET | Refills: 3 | Status: SHIPPED | OUTPATIENT
Start: 2019-05-31 | End: 2019-09-27 | Stop reason: SDUPTHER

## 2019-05-31 NOTE — PROGRESS NOTES
Chief Complaint   Patient presents with   Doug Spencer \[facility] [place] [facility]\"" Cardiologist     DR Madeline Mark    Abnormal Test Results     PVR       Patient presents for initial medical evaluation. Patient is followed on a regular basis by Dr. Rosmery Mcconnell MD. Presents from Dr. Ashley Krishna for abnormal ANASTACIA evaluation  C/o b/l BTK cold feet and discoloration. States he has pain all the time in his toes and cramps in his feet. S/p abnormal Anastacia showing mainly BTK disease. Does have hx of diabetic neuropathy. Does have a hx of AAA measuring 2.5cm. Pt denies chest pain, dyspnea, dyspnea on exertion, change in exercise capacity, fatigue,  nausea, vomiting, diarrhea, constipation, motor weakness, insomnia, weight loss, syncope, dizziness, lightheadedness, palpitations, PND, orthopnea. No hx of cardiac disease.   + hx of DM, HTN and HLP. + hx of smoking. HgA1c is 7.3.  Lipid profile is abnormal.           Patient Active Problem List   Diagnosis    Umbilical hernia without obstruction or gangrene    DM [facility] [address]    HTN (hypertension)    Hyperlipidemia    PMR (polymyalgia rheumatica) (HCC)    Abdominal aortic aneurysm (AAA) without rupture ([facility] [address]       Past Surgical History:   Procedure Laterality Date    BACK SURGERY      CHOLECYSTECTOMY      HAND SURGERY Left 11/18/2015    dupuytren''s release with left thumb    INGUINAL HERNIA REPAIR      right side       Social History     Socioeconomic History    Marital status:      Spouse name: Not on file    Number of children: Not on file    Years of education: Not on file    Highest education level: Not on file   Occupational History    Not on file   Social Needs    Financial resource strain: Not on file    Food insecurity:     Worry: Not on file     Inability: Not on file    Transportation needs:     Medical: Not on file     Non-medical: Not on file   Tobacco Use    Smoking status: Former Smoker     Packs/day: 1.00     Years: 30.00     Pack years: 30.00 Types: Cigarettes     Last attempt to quit: 1995     Years since quittin.5    Smokeless tobacco: Never Used   Substance and Sexual Activity    Alcohol use: No     Alcohol/week: 0.0 oz    Drug use: No    Sexual activity: Not Currently   Lifestyle    Physical activity:     Days per week: Not on file     Minutes per session: Not on file    Stress: Not on file   Relationships    Social connections:     Talks on phone: Not on file     Gets together: Not on file     Attends Denominational service: Not on file     Active member of club or organization: Not on file     Attends meetings of clubs or organizations: Not on file     Relationship status: Not on file    Intimate partner violence:     Fear of current or ex partner: Not on file     Emotionally abused: Not on file     Physically abused: Not on file     Forced sexual activity: Not on file   Other Topics Concern    Not on file   Social History Narrative    Not on file       No family history on file. Current Outpatient Medications   Medication Sig Dispense Refill    atorvastatin (LIPITOR) 20 MG tablet Take 1 tablet by mouth daily 30 tablet 3    cilostazol (PLETAL) 50 MG tablet Take 1 tablet by mouth 2 times daily 60 tablet 3    oxyCODONE-acetaminophen (PERCOCET) 5-325 MG per tablet Take 1-2 tablets by mouth every 6 hours as needed for Pain for up to 3 days. WARNING:  May cause drowsiness. May impair ability to operate vehicles or machinery. Do not use in combination with alcohol. 20 tablet 0    cyclobenzaprine (FLEXERIL) 10 MG tablet Take 1 tablet by mouth 3 times daily as needed for Muscle spasms 30 tablet 0    predniSONE (DELTASONE) 10 MG tablet Take 6 tablets by mouth daily for 5 doses 30 tablet 0    aspirin 325 MG tablet Take 325 mg by mouth Two tablets nightly      lisinopril (PRINIVIL;ZESTRIL) 20 MG tablet Take 1 tablet by mouth daily 90 tablet 2    lidocaine (LIDODERM) 5 % Place 1 patch onto the skin daily 12 hours on, 12 hours off. 10 patch 0    blood glucose test strips (ONE TOUCH TEST STRIPS) strip Check blood sugar  twice daily As needed. 300 each 3    Lancets MISC Test bid 300 each 3    metFORMIN (GLUCOPHAGE) 500 MG tablet TAKE ONE AND A HALF  TABLETS BY MOUTH  A DAY WITH MEALS 180 tablet 2    vitamin D (CHOLECALCIFEROL) 1000 UNIT TABS tablet Take 1 tablet by mouth daily 90 tablet 2     No current facility-administered medications for this visit. Patient has no known allergies. Review of Systems:  General ROS: negative  Psychological ROS: negative  Hematological and Lymphatic ROS: No history of blood clots or bleeding disorder. Respiratory ROS: no cough, shortness of breath, or wheezing  Cardiovascular ROS: no chest pain or dyspnea on exertion  Gastrointestinal ROS: no abdominal pain, change in bowel habits, or black or bloody stools  Genito-Urinary ROS: no dysuria, trouble voiding, or hematuria  Musculoskeletal ROS: negative  Neurological ROS: no TIA or stroke symptoms  Dermatological ROS: negative    VITALS:  Blood pressure 120/70, pulse 54, height 5' 10\" (1.778 m), weight 165 lb (74.8 kg). Body mass index is 23.68 kg/m². Physical Examination:  General appearance - alert, well appearing, and in no distress  Mental status - alert, oriented to person, place, and time  Neck - Neck is supple, no JVD or carotid bruits. No thyromegaly or adenopathy. Chest - clear to auscultation, no wheezes, rales or rhonchi, symmetric air entry  Heart - normal rate, regular rhythm, normal S1, S2, no murmurs, rubs, clicks or gallops  Abdomen - soft, nontender, nondistended, no masses or organomegaly  Neurological - alert, oriented, normal speech, no focal findings or movement disorder noted  Extremities - peripheral pulses abnormal, no pedal edema, no clubbing or cyanosis  Skin - normal coloration and turgor, no rashes, no suspicious skin lesions noted      Pulses:   Carotid pulses are 3+ on the right side, and 3+ on the left side. Radial pulses are 3+ on the right side, and 3+ on the left side. Femoral pulses are 2+ on the right side, and 2+ on the left side. Popliteal pulses are 1+ on the right side, and 1+ on the left side. Dorsalis pedis pulses are 0+ on the right side, and 0+ on the left side. Posterior tibial pulses are 0+ on the right side, and 0+ on the left side. EKG: normal sinus rhythm, nonspecific ST and T waves changes    Orders Placed This Encounter   Procedures    US DUP LOWER ART/BYPASS GRAFTS BILATERAL COMPLETE    EKG 12 Lead       ASSESSMENT:     Diagnosis Orders   1. Essential hypertension  EKG 12 Lead   2. Hyperlipidemia, unspecified hyperlipidemia type     3. Abdominal aortic aneurysm (AAA) without rupture ([address]     4. Abnormal ankle brachial index (ANASTACIA)  US DUP LOWER ART/BYPASS GRAFTS BILATERAL COMPLETE         PLAN:     Patient will need to continue to follow up with you for their general medical care   As always, aggressive risk factor modification is strongly recommended. We should adhere to the JNC VIII guidelines for HTN management and the NCEP ATP III guidelines for LDL-C management. Cardiac diet is always recommended with low fat, cholesterol, calories and sodium. Continue medications at current doses. Recommend statin    Add Pletal     Check b/l LE arterial duplex US    Coronary evaluation in future given risk factors    Consider Neurontin if symptoms are not relieved. Patient was advised and encouraged to check blood pressure at home or at a pharmacy, maintain a logbook, and also call us back if blood pressure are above the target ranges or if it is low. Patient clearly understands and agrees to the instructions. We will need to continue to monitor muscle and liver enzymes, BUN, CR, and electrolytes.

## 2019-06-04 ENCOUNTER — HOSPITAL ENCOUNTER (OUTPATIENT)
Dept: ULTRASOUND IMAGING | Age: 74
Discharge: HOME | End: 2019-06-06
Payer: MEDICARE

## 2019-06-04 DIAGNOSIS — R68.89 ABNORMAL ANKLE BRACHIAL INDEX (ABI): ICD-10-CM

## 2019-06-04 PROCEDURE — 93925 LOWER EXTREMITY STUDY: CPT

## 2019-07-08 RX ORDER — ATORVASTATIN CALCIUM 20 MG/1
20 TABLET, FILM COATED ORAL DAILY
Qty: 90 TABLET | Refills: 3 | Status: CANCELLED | OUTPATIENT
Start: 2019-07-08

## 2019-07-11 DIAGNOSIS — E78.5 HYPERLIPIDEMIA, UNSPECIFIED HYPERLIPIDEMIA TYPE: Primary | ICD-10-CM

## 2019-07-11 RX ORDER — ATORVASTATIN CALCIUM 20 MG/1
20 TABLET, FILM COATED ORAL DAILY
Qty: 90 TABLET | Refills: 2 | Status: SHIPPED | OUTPATIENT
Start: 2019-07-11 | End: 2019-10-25 | Stop reason: ALTCHOICE

## 2019-09-11 ENCOUNTER — OFFICE VISIT (OUTPATIENT)
Dept: INTERNAL MEDICINE | Age: 74
End: 2019-09-11
Payer: MEDICARE

## 2019-09-11 ENCOUNTER — CARE COORDINATION (OUTPATIENT)
Dept: CARE COORDINATION | Age: 74
End: 2019-09-11

## 2019-09-11 VITALS
WEIGHT: 165.4 LBS | HEIGHT: 70 IN | BODY MASS INDEX: 23.68 KG/M2 | OXYGEN SATURATION: 98 % | HEART RATE: 81 BPM | SYSTOLIC BLOOD PRESSURE: 100 MMHG | DIASTOLIC BLOOD PRESSURE: 80 MMHG

## 2019-09-11 DIAGNOSIS — E11.8 TYPE 2 DIABETES MELLITUS WITH COMPLICATION, WITHOUT LONG-TERM CURRENT USE OF INSULIN (HCC): ICD-10-CM

## 2019-09-11 DIAGNOSIS — E78.5 HYPERLIPIDEMIA, UNSPECIFIED HYPERLIPIDEMIA TYPE: ICD-10-CM

## 2019-09-11 DIAGNOSIS — I73.9 PAD (PERIPHERAL ARTERY DISEASE) (HCC): ICD-10-CM

## 2019-09-11 DIAGNOSIS — M35.3 PMR (POLYMYALGIA RHEUMATICA) (HCC): ICD-10-CM

## 2019-09-11 DIAGNOSIS — E11.8 TYPE 2 DIABETES MELLITUS WITH COMPLICATION, WITHOUT LONG-TERM CURRENT USE OF INSULIN (HCC): Primary | ICD-10-CM

## 2019-09-11 DIAGNOSIS — I71.40 ABDOMINAL AORTIC ANEURYSM (AAA) WITHOUT RUPTURE: ICD-10-CM

## 2019-09-11 DIAGNOSIS — I10 ESSENTIAL HYPERTENSION: ICD-10-CM

## 2019-09-11 LAB
ALBUMIN SERPL-MCNC: 4.4 G/DL (ref 3.5–4.6)
ALP BLD-CCNC: 52 U/L (ref 35–104)
ALT SERPL-CCNC: 18 U/L (ref 0–41)
ANION GAP SERPL CALCULATED.3IONS-SCNC: 13 MEQ/L (ref 9–15)
AST SERPL-CCNC: 16 U/L (ref 0–40)
BILIRUB SERPL-MCNC: <0.2 MG/DL (ref 0.2–0.7)
BUN BLDV-MCNC: 18 MG/DL (ref 8–23)
CALCIUM SERPL-MCNC: 9.7 MG/DL (ref 8.5–9.9)
CHLORIDE BLD-SCNC: 103 MEQ/L (ref 95–107)
CHOLESTEROL, TOTAL: 124 MG/DL (ref 0–199)
CO2: 26 MEQ/L (ref 20–31)
CREAT SERPL-MCNC: 1.01 MG/DL (ref 0.7–1.2)
GFR AFRICAN AMERICAN: >60
GFR NON-AFRICAN AMERICAN: >60
GLOBULIN: 3.3 G/DL (ref 2.3–3.5)
GLUCOSE BLD-MCNC: 163 MG/DL (ref 70–99)
HBA1C MFR BLD: 7.7 %
HDLC SERPL-MCNC: 30 MG/DL (ref 40–59)
LDL CHOLESTEROL CALCULATED: 56 MG/DL (ref 0–129)
POTASSIUM SERPL-SCNC: 4.4 MEQ/L (ref 3.4–4.9)
SODIUM BLD-SCNC: 142 MEQ/L (ref 135–144)
TOTAL PROTEIN: 7.7 G/DL (ref 6.3–8)
TRIGL SERPL-MCNC: 188 MG/DL (ref 0–150)

## 2019-09-11 PROCEDURE — 99214 OFFICE O/P EST MOD 30 MIN: CPT | Performed by: FAMILY MEDICINE

## 2019-09-11 PROCEDURE — 83036 HEMOGLOBIN GLYCOSYLATED A1C: CPT | Performed by: FAMILY MEDICINE

## 2019-09-11 ASSESSMENT — ENCOUNTER SYMPTOMS
APNEA: 0
EYE DISCHARGE: 1
EYE ITCHING: 1
CHEST TIGHTNESS: 0
CHOKING: 0

## 2019-09-11 ASSESSMENT — PATIENT HEALTH QUESTIONNAIRE - PHQ9
1. LITTLE INTEREST OR PLEASURE IN DOING THINGS: 0
SUM OF ALL RESPONSES TO PHQ9 QUESTIONS 1 & 2: 0
SUM OF ALL RESPONSES TO PHQ QUESTIONS 1-9: 0
2. FEELING DOWN, DEPRESSED OR HOPELESS: 0
SUM OF ALL RESPONSES TO PHQ QUESTIONS 1-9: 0

## 2019-09-11 NOTE — PROGRESS NOTES
Patient: Anurag Rocha    YOB: 1945    Date: 19       Patient Active Problem List    Diagnosis Date Noted    HTN (hypertension) 2014     Priority: High    DM (diabetes mellitus) ([facility] 2013     Priority: High    Abdominal aortic aneurysm (AAA) without rupture ([facility] 2017     Priority: Medium     Overview Note:     2.5 cm US 2018      PMR (polymyalgia rheumatica) ([facility] 2014     Priority: Medium    Hyperlipidemia 2014     Priority: Medium    Umbilical hernia without obstruction or gangrene 2013     Priority: Low     Past Medical History:   Diagnosis Date    [facility]     Abdominal aortic aneurysm (AAA) without rupture ([facility]     Callus of foot 2013    Diastasis recti 2013    DM (diabetes mellitus) ([facility]     Gout     Gout     Hyperlipidemia     Hypertension     Neuropathy     secondary to DM    Neuropathy     PMR (polymyalgia rheumatica) (HCC) 2014    Polymyalgia rheumatica (HCC)      Past Surgical History:   Procedure Laterality Date    BACK SURGERY      CHOLECYSTECTOMY      HAND SURGERY Left 2015    dupuytren''s release with left thumb    INGUINAL HERNIA REPAIR      right side     Social History     Socioeconomic History    Marital status:      Spouse name: Not on file    Number of children: Not on file    Years of education: Not on file    Highest education level: Not on file   Occupational History    Not on file   Social Needs    Financial resource strain: Not on file    Food insecurity:     Worry: Not on file     Inability: Not on file    Transportation needs:     Medical: Not on file     Non-medical: Not on file   Tobacco Use    Smoking status: Former Smoker     Packs/day: 1.00     Years: 30.00     Pack years: 30.00     Types: Cigarettes     Last attempt to quit: 1995     Years since quittin.8    Smokeless tobacco: Never Used   Substance and Sexual Activity well-developed and well-nourished. No distress. HENT:   Head: Normocephalic and atraumatic. Right Ear: External ear normal.   Left Ear: External ear normal.   Nose: Nose normal.   Eyes: Conjunctivae and EOM are normal.  Right eye exhibits no discharge. Left eye exhibits no discharge. No scleral icterus. Neck: Normal range of motion. Cardiovascular: Normal rate, regular rhythm and normal heart sounds. No murmur heard. Pulmonary/Chest: Effort normal and breath sounds normal. No respiratory distress. no wheezes. no rales. no tenderness. Musculoskeletal: Normal range of motion. Neurological: alert. Skin: not diaphoretic. Psychiatric: normal mood and affect. behavior is normal. Judgment and thought content normal.   Nursing note and vitals reviewed. Normal monofilament test of bilateral feet        Assessment:  Martir Brito was seen today for diabetes, hypertension and hyperlipidemia. Diagnoses and all orders for this visit:    Type 2 diabetes mellitus with complication, without long-term current use of insulin (HCC)  -     POCT glycosylated hemoglobin (Hb A1C)  -     Comprehensive Metabolic Panel; Future  He is only taking 1/2 dose of metformin at night   A1C stable    Essential hypertension  -     Comprehensive Metabolic Panel; Future  Stable, controlled  Plan: continue current medications    PMR (polymyalgia rheumatica) (HCC)  Improved  No steroids    Hyperlipidemia, unspecified hyperlipidemia type  -     Lipid Panel;  Future  Started statin per cardiology, did not want to see cards or start statin with me    Abdominal aortic aneurysm (AAA) without rupture ([facility]  -     Ambulatory referral to Cardiology  Stable    PAD (peripheral artery disease) ([facility]  -     Ambulatory referral to Cardiology  Doing better with pletal  Hand and feet not as cold      REFUSED ALL PREVENTATIVE CARE    Orders Placed This Encounter   Procedures    POCT glycosylated hemoglobin (Hb A1C)      I personally reviewed all recent

## 2019-09-11 NOTE — PROGRESS NOTES
Maude Young I note that you reached out to patient already regarding transportation.   Can you arrange if patient in agreement to [facility] for his new patient appointment with Dr. Denise Black

## 2019-09-23 ENCOUNTER — CARE COORDINATION (OUTPATIENT)
Dept: CARE COORDINATION | Age: 74
End: 2019-09-23

## 2019-09-27 ENCOUNTER — OFFICE VISIT (OUTPATIENT)
Dept: CARDIOLOGY CLINIC | Age: 74
End: 2019-09-27
Payer: MEDICARE

## 2019-09-27 ENCOUNTER — TELEPHONE (OUTPATIENT)
Dept: CARDIOLOGY CLINIC | Age: 74
End: 2019-09-27

## 2019-09-27 VITALS
DIASTOLIC BLOOD PRESSURE: 60 MMHG | WEIGHT: 164 LBS | SYSTOLIC BLOOD PRESSURE: 130 MMHG | HEART RATE: 67 BPM | BODY MASS INDEX: 23.48 KG/M2 | HEIGHT: 70 IN | OXYGEN SATURATION: 98 %

## 2019-09-27 DIAGNOSIS — I71.40 ABDOMINAL AORTIC ANEURYSM (AAA) WITHOUT RUPTURE: ICD-10-CM

## 2019-09-27 DIAGNOSIS — R68.89 ABNORMAL ANKLE BRACHIAL INDEX (ABI): Primary | ICD-10-CM

## 2019-09-27 DIAGNOSIS — E78.5 HYPERLIPIDEMIA, UNSPECIFIED HYPERLIPIDEMIA TYPE: ICD-10-CM

## 2019-09-27 DIAGNOSIS — I10 ESSENTIAL HYPERTENSION: Primary | ICD-10-CM

## 2019-09-27 PROCEDURE — 99214 OFFICE O/P EST MOD 30 MIN: CPT | Performed by: INTERNAL MEDICINE

## 2019-09-27 RX ORDER — CILOSTAZOL 50 MG/1
50 TABLET ORAL 2 TIMES DAILY
Qty: 60 TABLET | Refills: 3 | Status: SHIPPED | OUTPATIENT
Start: 2019-09-27 | End: 2019-12-27 | Stop reason: SDUPTHER

## 2019-09-27 NOTE — PROGRESS NOTES
No family history on file. Current Outpatient Medications   Medication Sig Dispense Refill    APPLE CIDER VINEGAR PO Take by mouth      Omega-3 Fatty Acids (FISH OIL PO) Take by mouth      metFORMIN (GLUCOPHAGE) 1000 MG tablet Take 1 tablet by mouth 2 times daily 90 tablet 4    atorvastatin (LIPITOR) 20 MG tablet Take 1 tablet by mouth daily 90 tablet 2    cilostazol (PLETAL) 50 MG tablet Take 1 tablet by mouth 2 times daily 60 tablet 3    aspirin 325 MG tablet Take 325 mg by mouth Two tablets nightly      lisinopril (PRINIVIL;ZESTRIL) 20 MG tablet Take 1 tablet by mouth daily 90 tablet 2    blood glucose test strips (ONE TOUCH TEST STRIPS) strip Check blood sugar  twice daily As needed. 300 each 3    Lancets MISC Test bid 300 each 3    vitamin D (CHOLECALCIFEROL) 1000 UNIT TABS tablet Take 1 tablet by mouth daily 90 tablet 2     No current facility-administered medications for this visit. Patient has no known allergies. Review of Systems:  General ROS: negative  Psychological ROS: negative  Hematological and Lymphatic ROS: No history of blood clots or bleeding disorder. Respiratory ROS: no cough, shortness of breath, or wheezing  Cardiovascular ROS: no chest pain or dyspnea on exertion  Gastrointestinal ROS: no abdominal pain, change in bowel habits, or black or bloody stools  Genito-Urinary ROS: no dysuria, trouble voiding, or hematuria  Musculoskeletal ROS: negative  Neurological ROS: no TIA or stroke symptoms  Dermatological ROS: negative    VITALS:  Blood pressure 130/60, pulse 67, height 5' 10\" (1.778 m), weight 164 lb (74.4 kg), SpO2 98 %. Body mass index is 23.53 kg/m². Physical Examination:  General appearance - alert, well appearing, and in no distress  Mental status - alert, oriented to person, place, and time  Neck - Neck is supple, no JVD or carotid bruits. No thyromegaly or adenopathy.    Chest - clear to auscultation, no wheezes, rales or rhonchi, symmetric air

## 2019-10-01 DIAGNOSIS — E11.8 TYPE 2 DIABETES MELLITUS WITH COMPLICATION, WITHOUT LONG-TERM CURRENT USE OF INSULIN (HCC): ICD-10-CM

## 2019-10-09 ENCOUNTER — HOSPITAL ENCOUNTER (OUTPATIENT)
Dept: CARDIAC CATH/INVASIVE PROCEDURES | Age: 74
Discharge: HOME | End: 2019-10-09
Attending: INTERNAL MEDICINE | Admitting: INTERNAL MEDICINE
Payer: MEDICARE

## 2019-10-09 ENCOUNTER — APPOINTMENT (OUTPATIENT)
Dept: ULTRASOUND IMAGING | Age: 74
End: 2019-10-09
Attending: INTERNAL MEDICINE
Payer: MEDICARE

## 2019-10-09 VITALS
HEIGHT: 70 IN | HEART RATE: 75 BPM | BODY MASS INDEX: 23.48 KG/M2 | WEIGHT: 164 LBS | TEMPERATURE: 96.9 F | RESPIRATION RATE: 16 BRPM | DIASTOLIC BLOOD PRESSURE: 67 MMHG | OXYGEN SATURATION: 97 % | SYSTOLIC BLOOD PRESSURE: 128 MMHG

## 2019-10-09 DIAGNOSIS — R68.89 ABNORMAL ANKLE BRACHIAL INDEX (ABI): ICD-10-CM

## 2019-10-09 LAB
ANION GAP SERPL CALCULATED.3IONS-SCNC: 12 MEQ/L (ref 9–15)
BUN BLDV-MCNC: 19 MG/DL (ref 8–23)
CALCIUM SERPL-MCNC: 9.8 MG/DL (ref 8.5–9.9)
CHLORIDE BLD-SCNC: 102 MEQ/L (ref 95–107)
CHOLESTEROL, TOTAL: 139 MG/DL (ref 0–199)
CO2: 25 MEQ/L (ref 20–31)
CREAT SERPL-MCNC: 0.97 MG/DL (ref 0.7–1.2)
GFR AFRICAN AMERICAN: >60
GFR NON-AFRICAN AMERICAN: >60
GLUCOSE BLD-MCNC: 145 MG/DL (ref 70–99)
HCT VFR BLD CALC: 40.3 % (ref 42–52)
HDLC SERPL-MCNC: 35 MG/DL (ref 40–59)
HEMOGLOBIN: 13.6 G/DL (ref 14–18)
LDL CHOLESTEROL CALCULATED: 69 MG/DL (ref 0–129)
MCH RBC QN AUTO: 31 PG (ref 27–31.3)
MCHC RBC AUTO-ENTMCNC: 33.8 % (ref 33–37)
MCV RBC AUTO: 91.8 FL (ref 80–100)
PDW BLD-RTO: 14 % (ref 11.5–14.5)
PLATELET # BLD: 205 K/UL (ref 130–400)
POTASSIUM SERPL-SCNC: 4.8 MEQ/L (ref 3.4–4.9)
RBC # BLD: 4.39 M/UL (ref 4.7–6.1)
SODIUM BLD-SCNC: 139 MEQ/L (ref 135–144)
TRIGL SERPL-MCNC: 175 MG/DL (ref 0–150)
WBC # BLD: 6.3 K/UL (ref 4.8–10.8)

## 2019-10-09 PROCEDURE — 75716 ARTERY X-RAYS ARMS/LEGS: CPT | Performed by: INTERNAL MEDICINE

## 2019-10-09 PROCEDURE — 80061 LIPID PANEL: CPT

## 2019-10-09 PROCEDURE — C1724 CATH, TRANS ATHEREC,ROTATION: HCPCS

## 2019-10-09 PROCEDURE — 2580000003 HC RX 258

## 2019-10-09 PROCEDURE — C1887 CATHETER, GUIDING: HCPCS

## 2019-10-09 PROCEDURE — 80048 BASIC METABOLIC PNL TOTAL CA: CPT

## 2019-10-09 PROCEDURE — 6360000004 HC RX CONTRAST MEDICATION: Performed by: INTERNAL MEDICINE

## 2019-10-09 PROCEDURE — C1760 CLOSURE DEV, VASC: HCPCS

## 2019-10-09 PROCEDURE — 2580000003 HC RX 258: Performed by: INTERNAL MEDICINE

## 2019-10-09 PROCEDURE — 85027 COMPLETE CBC AUTOMATED: CPT

## 2019-10-09 PROCEDURE — 37229 HC TIB PER TERRITORY ATHER: CPT | Performed by: INTERNAL MEDICINE

## 2019-10-09 PROCEDURE — 37229 PR REVSC OPN/PRQ TIB/PERO W/ATHRC/ANGIOP SM VSL: CPT | Performed by: INTERNAL MEDICINE

## 2019-10-09 PROCEDURE — 2500000003 HC RX 250 WO HCPCS

## 2019-10-09 PROCEDURE — C1894 INTRO/SHEATH, NON-LASER: HCPCS

## 2019-10-09 PROCEDURE — 6370000000 HC RX 637 (ALT 250 FOR IP)

## 2019-10-09 PROCEDURE — 75774 ARTERY X-RAY EACH VESSEL: CPT | Performed by: INTERNAL MEDICINE

## 2019-10-09 PROCEDURE — C1769 GUIDE WIRE: HCPCS

## 2019-10-09 PROCEDURE — C1725 CATH, TRANSLUMIN NON-LASER: HCPCS

## 2019-10-09 PROCEDURE — 6360000002 HC RX W HCPCS

## 2019-10-09 PROCEDURE — 2709999900 HC NON-CHARGEABLE SUPPLY

## 2019-10-09 RX ORDER — HYDRALAZINE HYDROCHLORIDE 20 MG/ML
10 INJECTION INTRAMUSCULAR; INTRAVENOUS EVERY 10 MIN PRN
Status: CANCELLED | OUTPATIENT
Start: 2019-10-09

## 2019-10-09 RX ORDER — IODIXANOL 320 MG/ML
100 INJECTION, SOLUTION INTRAVASCULAR ONCE
Status: COMPLETED | OUTPATIENT
Start: 2019-10-09 | End: 2019-10-09

## 2019-10-09 RX ORDER — SODIUM CHLORIDE 0.9 % (FLUSH) 0.9 %
10 SYRINGE (ML) INJECTION EVERY 12 HOURS SCHEDULED
Status: DISCONTINUED | OUTPATIENT
Start: 2019-10-09 | End: 2019-10-09 | Stop reason: HOSPADM

## 2019-10-09 RX ORDER — ONDANSETRON 2 MG/ML
4 INJECTION INTRAMUSCULAR; INTRAVENOUS EVERY 6 HOURS PRN
Status: CANCELLED | OUTPATIENT
Start: 2019-10-09

## 2019-10-09 RX ORDER — SODIUM CHLORIDE 9 MG/ML
INJECTION, SOLUTION INTRAVENOUS CONTINUOUS
Status: CANCELLED | OUTPATIENT
Start: 2019-10-09 | End: 2019-10-09

## 2019-10-09 RX ORDER — SODIUM CHLORIDE 0.9 % (FLUSH) 0.9 %
10 SYRINGE (ML) INJECTION PRN
Status: DISCONTINUED | OUTPATIENT
Start: 2019-10-09 | End: 2019-10-09 | Stop reason: HOSPADM

## 2019-10-09 RX ORDER — CLOPIDOGREL BISULFATE 75 MG/1
75 TABLET ORAL DAILY
Qty: 30 TABLET | Refills: 3 | Status: SHIPPED | OUTPATIENT
Start: 2019-10-09 | End: 2019-12-27 | Stop reason: SDUPTHER

## 2019-10-09 RX ORDER — LABETALOL 20 MG/4 ML (5 MG/ML) INTRAVENOUS SYRINGE
10 EVERY 30 MIN PRN
Status: CANCELLED | OUTPATIENT
Start: 2019-10-09

## 2019-10-09 RX ORDER — ACETAMINOPHEN 325 MG/1
650 TABLET ORAL EVERY 4 HOURS PRN
Status: CANCELLED | OUTPATIENT
Start: 2019-10-09

## 2019-10-09 RX ORDER — SODIUM CHLORIDE 450 MG/100ML
INJECTION, SOLUTION INTRAVENOUS CONTINUOUS
Status: DISCONTINUED | OUTPATIENT
Start: 2019-10-09 | End: 2019-10-09 | Stop reason: HOSPADM

## 2019-10-09 RX ADMIN — SODIUM CHLORIDE: 4.5 INJECTION, SOLUTION INTRAVENOUS at 09:54

## 2019-10-09 RX ADMIN — IODIXANOL 150 ML: 320 INJECTION, SOLUTION INTRAVASCULAR at 13:01

## 2019-10-14 ENCOUNTER — CARE COORDINATION (OUTPATIENT)
Dept: CARE COORDINATION | Age: 74
End: 2019-10-14

## 2019-10-25 ENCOUNTER — TELEPHONE (OUTPATIENT)
Dept: CARDIOLOGY CLINIC | Age: 74
End: 2019-10-25

## 2019-10-25 ENCOUNTER — HOSPITAL ENCOUNTER (EMERGENCY)
Age: 74
Discharge: HOME | End: 2019-10-25
Attending: EMERGENCY MEDICINE
Payer: MEDICARE

## 2019-10-25 ENCOUNTER — HOSPITAL ENCOUNTER (EMERGENCY)
Dept: ULTRASOUND IMAGING | Age: 74
Discharge: HOME | End: 2019-10-27
Payer: MEDICARE

## 2019-10-25 VITALS
BODY MASS INDEX: 24.29 KG/M2 | DIASTOLIC BLOOD PRESSURE: 70 MMHG | SYSTOLIC BLOOD PRESSURE: 168 MMHG | HEART RATE: 84 BPM | WEIGHT: 164 LBS | TEMPERATURE: 98.2 F | HEIGHT: 69 IN | RESPIRATION RATE: 18 BRPM | OXYGEN SATURATION: 99 %

## 2019-10-25 DIAGNOSIS — L03.115 CELLULITIS OF RIGHT LOWER EXTREMITY: Primary | ICD-10-CM

## 2019-10-25 LAB
ALBUMIN SERPL-MCNC: 4.2 G/DL (ref 3.5–4.6)
ALP BLD-CCNC: 56 U/L (ref 35–104)
ALT SERPL-CCNC: 9 U/L (ref 0–41)
ANION GAP SERPL CALCULATED.3IONS-SCNC: 13 MEQ/L (ref 9–15)
AST SERPL-CCNC: 15 U/L (ref 0–40)
BASOPHILS ABSOLUTE: 0 K/UL (ref 0–0.2)
BASOPHILS RELATIVE PERCENT: 0.4 %
BILIRUB SERPL-MCNC: <0.2 MG/DL (ref 0.2–0.7)
BUN BLDV-MCNC: 15 MG/DL (ref 8–23)
CALCIUM SERPL-MCNC: 10.2 MG/DL (ref 8.5–9.9)
CHLORIDE BLD-SCNC: 102 MEQ/L (ref 95–107)
CO2: 25 MEQ/L (ref 20–31)
CREAT SERPL-MCNC: 0.91 MG/DL (ref 0.7–1.2)
D DIMER: 1.08 MG/L FEU (ref 0–0.5)
EOSINOPHILS ABSOLUTE: 0.2 K/UL (ref 0–0.7)
EOSINOPHILS RELATIVE PERCENT: 3.2 %
GFR AFRICAN AMERICAN: >60
GFR NON-AFRICAN AMERICAN: >60
GLOBULIN: 2.9 G/DL (ref 2.3–3.5)
GLUCOSE BLD-MCNC: 135 MG/DL (ref 70–99)
HCT VFR BLD CALC: 35 % (ref 42–52)
HEMOGLOBIN: 11.7 G/DL (ref 14–18)
LYMPHOCYTES ABSOLUTE: 1.2 K/UL (ref 1–4.8)
LYMPHOCYTES RELATIVE PERCENT: 20.1 %
MCH RBC QN AUTO: 30.6 PG (ref 27–31.3)
MCHC RBC AUTO-ENTMCNC: 33.3 % (ref 33–37)
MCV RBC AUTO: 92.1 FL (ref 80–100)
MONOCYTES ABSOLUTE: 0.9 K/UL (ref 0.2–0.8)
MONOCYTES RELATIVE PERCENT: 14.8 %
NEUTROPHILS ABSOLUTE: 3.6 K/UL (ref 1.4–6.5)
NEUTROPHILS RELATIVE PERCENT: 61.5 %
PDW BLD-RTO: 13.2 % (ref 11.5–14.5)
PLATELET # BLD: 254 K/UL (ref 130–400)
POTASSIUM SERPL-SCNC: 4 MEQ/L (ref 3.4–4.9)
RBC # BLD: 3.81 M/UL (ref 4.7–6.1)
SODIUM BLD-SCNC: 140 MEQ/L (ref 135–144)
TOTAL PROTEIN: 7.1 G/DL (ref 6.3–8)
URIC ACID, SERUM: 6.5 MG/DL (ref 3.4–7)
WBC # BLD: 5.9 K/UL (ref 4.8–10.8)

## 2019-10-25 PROCEDURE — 85379 FIBRIN DEGRADATION QUANT: CPT

## 2019-10-25 PROCEDURE — 99283 EMERGENCY DEPT VISIT LOW MDM: CPT

## 2019-10-25 PROCEDURE — 6360000002 HC RX W HCPCS: Performed by: EMERGENCY MEDICINE

## 2019-10-25 PROCEDURE — 85025 COMPLETE CBC W/AUTO DIFF WBC: CPT

## 2019-10-25 PROCEDURE — 84550 ASSAY OF BLOOD/URIC ACID: CPT

## 2019-10-25 PROCEDURE — 6370000000 HC RX 637 (ALT 250 FOR IP): Performed by: EMERGENCY MEDICINE

## 2019-10-25 PROCEDURE — 80053 COMPREHEN METABOLIC PANEL: CPT

## 2019-10-25 PROCEDURE — 93971 EXTREMITY STUDY: CPT

## 2019-10-25 PROCEDURE — 36415 COLL VENOUS BLD VENIPUNCTURE: CPT

## 2019-10-25 PROCEDURE — 2580000003 HC RX 258: Performed by: EMERGENCY MEDICINE

## 2019-10-25 PROCEDURE — 96365 THER/PROPH/DIAG IV INF INIT: CPT

## 2019-10-25 RX ORDER — KETOROLAC TROMETHAMINE 10 MG/1
10 TABLET, FILM COATED ORAL EVERY 6 HOURS PRN
Qty: 20 TABLET | Refills: 0 | Status: SHIPPED | OUTPATIENT
Start: 2019-10-25 | End: 2019-12-27

## 2019-10-25 RX ORDER — SULFAMETHOXAZOLE AND TRIMETHOPRIM 800; 160 MG/1; MG/1
1 TABLET ORAL 2 TIMES DAILY
Qty: 20 TABLET | Refills: 0 | Status: SHIPPED | OUTPATIENT
Start: 2019-10-25 | End: 2019-11-04

## 2019-10-25 RX ORDER — KETOROLAC TROMETHAMINE 10 MG/1
20 TABLET, FILM COATED ORAL ONCE
Status: COMPLETED | OUTPATIENT
Start: 2019-10-25 | End: 2019-10-25

## 2019-10-25 RX ADMIN — CEFTRIAXONE 1 G: 1 INJECTION, POWDER, FOR SOLUTION INTRAMUSCULAR; INTRAVENOUS at 17:36

## 2019-10-25 RX ADMIN — KETOROLAC TROMETHAMINE 20 MG: 10 TABLET, FILM COATED ORAL at 15:42

## 2019-10-25 ASSESSMENT — ENCOUNTER SYMPTOMS
COLOR CHANGE: 0
RHINORRHEA: 0
PHOTOPHOBIA: 0
EYE PAIN: 0
NAUSEA: 0
SINUS PRESSURE: 0
ABDOMINAL PAIN: 0
ABDOMINAL DISTENTION: 0
SHORTNESS OF BREATH: 0
APNEA: 0
BACK PAIN: 0
COUGH: 0
SORE THROAT: 0
CONSTIPATION: 0
DIARRHEA: 0
WHEEZING: 0
VOMITING: 0

## 2019-10-25 ASSESSMENT — PAIN DESCRIPTION - FREQUENCY: FREQUENCY: CONTINUOUS

## 2019-10-25 ASSESSMENT — PAIN DESCRIPTION - ORIENTATION: ORIENTATION: RIGHT

## 2019-10-25 ASSESSMENT — PAIN DESCRIPTION - PAIN TYPE
TYPE: ACUTE PAIN
TYPE: ACUTE PAIN

## 2019-10-25 ASSESSMENT — PAIN DESCRIPTION - ONSET: ONSET: ON-GOING

## 2019-10-25 ASSESSMENT — PAIN DESCRIPTION - PROGRESSION: CLINICAL_PROGRESSION: GRADUALLY WORSENING

## 2019-10-25 ASSESSMENT — PAIN SCALES - GENERAL
PAINLEVEL_OUTOF10: 0
PAINLEVEL_OUTOF10: 0
PAINLEVEL_OUTOF10: 4

## 2019-10-25 ASSESSMENT — PAIN DESCRIPTION - LOCATION: LOCATION: FOOT

## 2019-10-25 ASSESSMENT — PAIN - FUNCTIONAL ASSESSMENT: PAIN_FUNCTIONAL_ASSESSMENT: PREVENTS OR INTERFERES SOME ACTIVE ACTIVITIES AND ADLS

## 2019-10-25 ASSESSMENT — PAIN DESCRIPTION - DESCRIPTORS: DESCRIPTORS: CONSTANT;TENDER;SHARP

## 2019-10-31 ENCOUNTER — CARE COORDINATION (OUTPATIENT)
Dept: CARE COORDINATION | Age: 74
End: 2019-10-31

## 2019-11-01 ENCOUNTER — CARE COORDINATION (OUTPATIENT)
Dept: CARE COORDINATION | Age: 74
End: 2019-11-01

## 2019-11-25 ENCOUNTER — CARE COORDINATION (OUTPATIENT)
Dept: CARE COORDINATION | Age: 74
End: 2019-11-25

## 2019-12-10 ENCOUNTER — CARE COORDINATION (OUTPATIENT)
Dept: CARE COORDINATION | Age: 74
End: 2019-12-10

## 2019-12-27 ENCOUNTER — OFFICE VISIT (OUTPATIENT)
Dept: CARDIOLOGY CLINIC | Age: 74
End: 2019-12-27
Payer: MEDICARE

## 2019-12-27 VITALS
DIASTOLIC BLOOD PRESSURE: 72 MMHG | HEIGHT: 70 IN | OXYGEN SATURATION: 97 % | WEIGHT: 160.4 LBS | HEART RATE: 69 BPM | SYSTOLIC BLOOD PRESSURE: 138 MMHG | RESPIRATION RATE: 16 BRPM | BODY MASS INDEX: 22.96 KG/M2

## 2019-12-27 DIAGNOSIS — I72.4 POPLITEAL ANEURYSM (HCC): ICD-10-CM

## 2019-12-27 DIAGNOSIS — I72.3 ANEURYSM OF COMMON ILIAC ARTERY (HCC): ICD-10-CM

## 2019-12-27 DIAGNOSIS — I71.40 ABDOMINAL AORTIC ANEURYSM (AAA) WITHOUT RUPTURE: Primary | ICD-10-CM

## 2019-12-27 DIAGNOSIS — R06.09 DOE (DYSPNEA ON EXERTION): ICD-10-CM

## 2019-12-27 PROCEDURE — 99214 OFFICE O/P EST MOD 30 MIN: CPT | Performed by: INTERNAL MEDICINE

## 2019-12-27 RX ORDER — CILOSTAZOL 50 MG/1
50 TABLET ORAL 2 TIMES DAILY
Qty: 60 TABLET | Refills: 3 | Status: SHIPPED | OUTPATIENT
Start: 2019-12-27 | End: 2020-05-20 | Stop reason: SDUPTHER

## 2019-12-27 RX ORDER — CLOPIDOGREL BISULFATE 75 MG/1
75 TABLET ORAL DAILY
Qty: 30 TABLET | Refills: 3 | Status: SHIPPED | OUTPATIENT
Start: 2019-12-27 | End: 2020-03-05

## 2020-01-03 ENCOUNTER — CARE COORDINATION (OUTPATIENT)
Dept: CARE COORDINATION | Age: 75
End: 2020-01-03

## 2020-01-03 NOTE — CARE COORDINATION
Telephone call with Denise/daughter. She feels care of patient at home is manageable at this time. No new needs or concerns voiced at time of phone call.

## 2020-01-21 ENCOUNTER — CARE COORDINATION (OUTPATIENT)
Dept: CARE COORDINATION | Age: 75
End: 2020-01-21

## 2020-02-04 ENCOUNTER — CARE COORDINATION (OUTPATIENT)
Dept: CARE COORDINATION | Age: 75
End: 2020-02-04

## 2020-02-06 ENCOUNTER — OFFICE VISIT (OUTPATIENT)
Dept: INTERNAL MEDICINE | Age: 75
End: 2020-02-06
Payer: MEDICARE

## 2020-02-06 VITALS
WEIGHT: 163 LBS | HEART RATE: 70 BPM | BODY MASS INDEX: 23.39 KG/M2 | SYSTOLIC BLOOD PRESSURE: 120 MMHG | OXYGEN SATURATION: 98 % | DIASTOLIC BLOOD PRESSURE: 72 MMHG

## 2020-02-06 LAB — HBA1C MFR BLD: 7 %

## 2020-02-06 PROCEDURE — 3051F HG A1C>EQUAL 7.0%<8.0%: CPT | Performed by: FAMILY MEDICINE

## 2020-02-06 PROCEDURE — 99214 OFFICE O/P EST MOD 30 MIN: CPT | Performed by: FAMILY MEDICINE

## 2020-02-06 PROCEDURE — 83036 HEMOGLOBIN GLYCOSYLATED A1C: CPT | Performed by: FAMILY MEDICINE

## 2020-02-06 RX ORDER — DONEPEZIL HYDROCHLORIDE 5 MG/1
5 TABLET, FILM COATED ORAL NIGHTLY
Qty: 30 TABLET | Refills: 2 | Status: SHIPPED | OUTPATIENT
Start: 2020-02-06 | End: 2020-05-05

## 2020-02-06 ASSESSMENT — PATIENT HEALTH QUESTIONNAIRE - PHQ9
SUM OF ALL RESPONSES TO PHQ9 QUESTIONS 1 & 2: 0
2. FEELING DOWN, DEPRESSED OR HOPELESS: 0
1. LITTLE INTEREST OR PLEASURE IN DOING THINGS: 0
SUM OF ALL RESPONSES TO PHQ QUESTIONS 1-9: 0
SUM OF ALL RESPONSES TO PHQ QUESTIONS 1-9: 0

## 2020-02-06 NOTE — PROGRESS NOTES
Patient: Jeanmarie Reagan    YOB: 1945    Date: 2/6/20       Patient Active Problem List    Diagnosis Date Noted    Abnormal ankle brachial index (ANASTACIA)      Priority: High    HTN (hypertension) 03/07/2014     Priority: High    DM (diabetes mellitus) ([facility] 09/26/2013     Priority: High    Abdominal aortic aneurysm (AAA) without rupture ([facility] 11/28/2017     Priority: Medium     Overview Note:     2.5 cm US 6/2018      PMR (polymyalgia rheumatica) ([facility] 05/19/2014     Priority: Medium    Hyperlipidemia 03/07/2014     Priority: Medium    Umbilical hernia without obstruction or gangrene 09/18/2013     Priority: Low     Past Medical History:   Diagnosis Date    AAA (abdominal aortic aneurysm) (HCC)     Abdominal aortic aneurysm (AAA) without rupture ([facility]     Callus of foot 9/26/2013    Diastasis recti 9/18/2013    DM (diabetes mellitus) ([facility]     Gout     Gout     Hyperlipidemia     Hypertension     Neuropathy     secondary to DM    Neuropathy     PMR (polymyalgia rheumatica) ([facility] 5/19/2014    Polymyalgia rheumatica ([facility]      Past Surgical History:   Procedure Laterality Date    BACK SURGERY      CHOLECYSTECTOMY      HAND SURGERY Left 11/18/2015    dupuytren''s release with left thumb    INGUINAL HERNIA REPAIR      right side    VASCULAR SURGERY Left oct 18th 2019    lleft leg triple bypass      Social History     Socioeconomic History    Marital status:       Spouse name: Not on file    Number of children: Not on file    Years of education: Not on file    Highest education level: Not on file   Occupational History    Not on file   Social Needs    Financial resource strain: Not on file    Food insecurity:     Worry: Not on file     Inability: Not on file    Transportation needs:     Medical: Not on file     Non-medical: Not on file   Tobacco Use    Smoking status: Former Smoker     Packs/day: 1.00     Years: 30.00     Pack years: 30.00     Types: Cigarettes     Last No  Tobacco history: He  reports that he quit smoking about 24 years ago. His smoking use included cigarettes. He has a 30.00 pack-year smoking history. He has never used smokeless tobacco.   Daily Aspirin? Yes  Have you had yourannual diabetic retinal (eye) exam? Yes - Records Requested    Hypertension:  Home blood pressure monitoring: No.  Heis adherent to a low sodium diet. Patient denies chest pain and shortness of breath. Antihypertensive medication side effects: no medication side effects noted and nomedication side effects noted. Use of agents associated with hypertension: none. Hyperlipidemia:  No new myalgias or GI upset on atorvastatin (Lipitor). Medication compliance: compliant all of the time. Patient is  following a low fat, low cholesterol diet. He is  exercising regularly.      Lab Results   Component Value Date    CHOL 139 10/09/2019    TRIG 175 (H) 10/09/2019    HDL 35 (L) 10/09/2019    LDLCALC 69 10/09/2019     Lab Results   Component Value Date    ALT 9 10/25/2019    AST 15 10/25/2019        Lab Results   Component Value Date    LABA1C 7.0 02/06/2020    LABA1C 7.7 09/11/2019    LABA1C 7.3 03/11/2019     Lab Results   Component Value Date    LABMICR <1.20 06/05/2018    CREATININE 0.91 10/25/2019     Lab Results   Component Value Date    ALT 9 10/25/2019    AST 15 10/25/2019     Lab Results   Component Value Date    CHOL 139 10/09/2019    TRIG 175 (H) 10/09/2019    HDL 35 (L) 10/09/2019    LDLCALC 69 10/09/2019        Diabetes and Hypertension Visit Information    BP Readings from Last 3 Encounters:   02/06/20 120/72   12/27/19 138/72   10/25/19 (!) 168/70              Patient Care Team:  Jayleen Cleary MD as PCP - General (Family Medicine)  Jayleen Cleary MD as PCP - [facility] Provider  Don Severino MD as Consulting Physician (Rheumatology)  Dylon Robb DO as Cardiologist (Cardiology)           Health Maintenance   Topic Date Due    Hepatitis B vaccine (1 of 3 - Risk 3-dose Last 3 Encounters:   02/06/20 120/72   12/27/19 138/72   10/25/19 (!) 168/70   Constitutional:  Appears well-developed and well-nourished. No distress. HENT:   Head: Normocephalic and atraumatic. Right Ear: External ear normal.   Left Ear: External ear normal.   Nose: Nose normal.   Eyes: Conjunctivae and EOM are normal.  Right eye exhibits no discharge. Left eye exhibits no discharge. No scleral icterus. Neck: Normal range of motion. Cardiovascular: Normal rate, regular rhythm and normal heart sounds. No murmur heard. Pulmonary/Chest: Effort normal and breath sounds normal. No respiratory distress. no wheezes. no rales. no tenderness. Musculoskeletal: Normal range of motion. Neurological: alert. Skin: not diaphoretic. Psychiatric: normal mood and affect. behavior is normal. Judgment and thought content normal.   Nursing note and vitals reviewed. Normal monofilament test of bilateral feet        Assessment:  Thao Bone was seen today for diabetes. Diagnoses and all orders for this visit:    Type 2 diabetes mellitus with complication, without long-term current use of insulin ([facility])  -     POCT glycosylated hemoglobin (Hb A1C)  Stable, controlled  Plan: continue current medications    Essential hypertension  Stable, controlled  Plan: continue current medications    PMR (polymyalgia rheumatica) ([facility])  Stable  No steroid at this time    Hyperlipidemia, unspecified hyperlipidemia type  Did not want to cont statin due to memory changes    Abdominal aortic aneurysm (AAA) without rupture ([address]  PAD (peripheral artery disease) ([facility]  Does not want to see cardiology anymore  He refused further testing with CT angio and stress test    Alzheimer's dementia without behavioral disturbance, unspecified timing of dementia onset ([address]  -     donepezil (ARICEPT) 5 MG tablet;  Take 1 tablet by mouth nightly   had a lengthy discussion with patient about treatment, it's side effects, the diagnosis & etiology of symptoms, along with management and expectations of outcome with the recommended treatment. Patient verbalized understanding. REFUSED ALL PREVENTATIVE CARE    Orders Placed This Encounter   Procedures    POCT glycosylated hemoglobin (Hb A1C)      I personally reviewed all recent labs and imaging pertaining to conditions mentioned above in assessment/plan in [facility] and care everywhere, discussed results with patient in office    Diabetes Counseling   Patient was counseled regarding disease risks and adopting healthy behaviors. Patient was provided education materials to assist with self management. Patient was provided log (or received log during previous visit) to record blood pressure, food intake and/or blood sugar. Patient was instructed to keep log up-to-date and to always bring log to all office visits. Reviewed the following:  - Morbidity from diabetes involves both macrovascular (atherosclerosis) and microvascular disease (retinopathy, nephropathy, and neuropathy). - Monitoring recommendations for patients with diabetes were discussed  1. Smoking cessation counseling (Every visit)   2. Blood pressure (Every visit) Goal <140/80   3. Dilated eye examination (Annually, more than annually if significant retinopathy)   4. Foot examination (Annually, every visit if peripheral vascular disease or neuropathy)   5. Laboratory studies:    - Fasting serum lipid profile (Annually)  cholesterol (goal LDL less than 100  mg/dL    - A1C (Every three to six months) Goal <7 percent   - Urinary albumin-to-creatinine ratio (Annually, protein excretion and serum  creatinine should also be monitored if persistent albuminuria is present)    - Serum creatinine, initially as indicated  6. ASA (75 to 162 mg/day) in patients with or at high risk for cardiovascular disease  7. Vaccinations:   - Pneumococcus, One time Patients over age 72 need a second dose if vaccine  was received ? 5 years previously and age was <65 at time of vaccination    - Influenza, Annually    - Hepatitis B, They should have had the three dose series   8. Education, self management review Annually      Return in about 3 months (around 5/6/2020) for Diabetes, Hypertension, Hyperlipidemia.

## 2020-02-06 NOTE — PATIENT INSTRUCTIONS
Hypertension / Hyperlipidemia Management    Blood Pressure Log      Tips to manage your condition    1. Keep your blood pressure below 130/80   Make sure your blood pressure is measured at every office visit    2. If you take antihypertensive drug therapy return for follow-up monthly until B/P goal is reached. 3. Follow-up with your physician to have your potassium and creatinine measured every 6 months. 4. Measure your blood pressure at home (use log to track your progress). 5. Keep your LDL (bad) cholesterol level below 130   Make sure your lipids are measured once a year     6. If you take LDL-lowering drug therapy return for follow-up every 6 months    Tips for healthy living    1. Start your day with breakfast  2. Exercise and slowly progress to (brisk walking, bike riding, etc) 30 minutes 3 to 5 days a week. 3. Snack in moderation (limit eating sugary or salty foods to no more than 3 times a week). 4. Eat more grains and vegetables (have no more than 3 servings of fruit daily). 5. Avoid tobacco use. 6. Drink alcohol in moderation (no more than 1 serving daily for woman and no more than 2 servings daily for men)  7. Obtain annual flu shot  8. Refer to patient education handouts given to you today. [facility] Address Phone Website   [facility]  [address] [place] [address] Peggy Johnson, [address] [place] [phone] [email or web address]       Weight Management Community Resources   Organization Address Phone Website   [facility] ([facility] [place] [address] Peggy Johnson, [address] [facility] [phone] n/a   [employer] Multiple meeting locations throughout [address] [place] [address] [address] [email or web address]     Tops n/a n/a [email or web address] [facility]  [place], [facility] [address] [phone] [email or web address]    [facility] [address] [place] [address] [facility] [phone]

## 2020-02-18 ENCOUNTER — HOSPITAL ENCOUNTER (OUTPATIENT)
Dept: NON INVASIVE DIAGNOSTICS | Age: 75
Discharge: HOME | End: 2020-02-18
Payer: MEDICARE

## 2020-02-18 ENCOUNTER — HOSPITAL ENCOUNTER (OUTPATIENT)
Dept: NUCLEAR MEDICINE | Age: 75
Discharge: HOME | End: 2020-02-20
Payer: MEDICARE

## 2020-02-18 VITALS
HEART RATE: 83 BPM | RESPIRATION RATE: 18 BRPM | HEIGHT: 70 IN | DIASTOLIC BLOOD PRESSURE: 78 MMHG | SYSTOLIC BLOOD PRESSURE: 158 MMHG | BODY MASS INDEX: 22.9 KG/M2 | WEIGHT: 160 LBS

## 2020-02-18 PROCEDURE — 3430000000 HC RX DIAGNOSTIC RADIOPHARMACEUTICAL: Performed by: INTERNAL MEDICINE

## 2020-02-18 PROCEDURE — 2580000003 HC RX 258: Performed by: INTERNAL MEDICINE

## 2020-02-18 PROCEDURE — 6360000002 HC RX W HCPCS: Performed by: INTERNAL MEDICINE

## 2020-02-18 PROCEDURE — A9502 TC99M TETROFOSMIN: HCPCS | Performed by: INTERNAL MEDICINE

## 2020-02-18 PROCEDURE — 93017 CV STRESS TEST TRACING ONLY: CPT

## 2020-02-18 PROCEDURE — 78452 HT MUSCLE IMAGE SPECT MULT: CPT

## 2020-02-18 RX ORDER — SODIUM CHLORIDE 0.9 % (FLUSH) 0.9 %
10 SYRINGE (ML) INJECTION PRN
Status: COMPLETED | OUTPATIENT
Start: 2020-02-18 | End: 2020-02-18

## 2020-02-18 RX ADMIN — Medication 10 ML: at 10:08

## 2020-02-18 RX ADMIN — REGADENOSON 0.4 MG: 0.08 INJECTION, SOLUTION INTRAVENOUS at 10:08

## 2020-02-18 RX ADMIN — Medication 10 ML: at 08:32

## 2020-02-18 RX ADMIN — TETROFOSMIN 11.1 MILLICURIE: 1.38 INJECTION, POWDER, LYOPHILIZED, FOR SOLUTION INTRAVENOUS at 08:32

## 2020-02-18 RX ADMIN — Medication 10 ML: at 10:09

## 2020-02-18 RX ADMIN — TETROFOSMIN 32.2 MILLICURIE: 1.38 INJECTION, POWDER, LYOPHILIZED, FOR SOLUTION INTRAVENOUS at 10:08

## 2020-02-20 PROCEDURE — 93018 CV STRESS TEST I&R ONLY: CPT | Performed by: INTERNAL MEDICINE

## 2020-02-20 NOTE — PROCEDURES
[address] [address]                              CARDIAC STRESS TEST    PATIENT NAME: Anneliese Ellis                     :          MED REC NO:   [id number]                              ROOM:  ACCOUNT NO:   [de-identified]                           ADMIT DATE: 2020  PROVIDER:     Madeline Wong MD    CARDIOVASCULAR DIAGNOSTIC DEPARTMENT    DATE OF STUDY:  2020    [facility]    ORDERING PROVIDERS:  Dr. Susan Cuevas and Lesa Valverde DO.    TECHNIQUE:  At rest, the patient was injected with 11.1 mCi of Myoview. Resting images were obtained. The patient was then given 0.4 mg of  Lexiscan followed by administration of 32.2 mCi of Myoview. Stress  tomographic images were then obtained. Left ventricular ejection  fraction and gated wall motion were acquired. RESULTS:  Resting heart rate is 77 beats per minute. Maximum heart rate  achieved was 100 beats per minute. No diagnostic ST-segment changes  were noted. IMAGING RESULTS:  Review of rest and stress tomographic images revealed  homogenous myocardial perfusion with no evidence of prior myocardial  infarction or ischemia. Left ventricular ejection fraction is 73%. TID  ratio is 0.87, which is within normal limits. IMPRESSION:  1. Homogenous myocardial perfusion with no evidence of prior myocardial  infarction or ischemia. 2.  Normal TID ratio. 3.  Normal left ventricular ejection fraction.         Jose Barnes MD    D: 2020 9:17:35       T: 2020 10:44:52     [email or web address]  Job#: [id number]     Doc#: [id number]    CC:

## 2020-02-24 ENCOUNTER — CARE COORDINATION (OUTPATIENT)
Dept: CARE COORDINATION | Age: 75
End: 2020-02-24

## 2020-02-24 NOTE — CARE COORDINATION
Telephone call with Denise/daughter. She was wondering if some of patient's behaviors are due to medication. Recommended daughter contact the ordering physician to see if behaviors are due to medication. She is also in process of getting patient involved with [employer]. She was wondering if in [facility] if there was another exercise program.  Offered to do some research on other exercise  programs. Daughter felt she could do this on her own.

## 2020-03-05 RX ORDER — CLOPIDOGREL BISULFATE 75 MG/1
TABLET ORAL
Qty: 90 TABLET | Refills: 1 | Status: SHIPPED | OUTPATIENT
Start: 2020-03-05 | End: 2020-05-20 | Stop reason: SDUPTHER

## 2020-04-21 ENCOUNTER — VIRTUAL VISIT (OUTPATIENT)
Dept: INTERNAL MEDICINE | Age: 75
End: 2020-04-21
Payer: MEDICARE

## 2020-04-21 PROBLEM — G30.9 ALZHEIMER'S DEMENTIA WITHOUT BEHAVIORAL DISTURBANCE (HCC): Status: ACTIVE | Noted: 2020-04-21

## 2020-04-21 PROBLEM — I72.4 POPLITEAL ANEURYSM (HCC): Status: ACTIVE | Noted: 2020-04-21

## 2020-04-21 PROBLEM — I73.9 PAD (PERIPHERAL ARTERY DISEASE) (HCC): Status: ACTIVE | Noted: 2020-04-21

## 2020-04-21 PROBLEM — F02.80 ALZHEIMER'S DEMENTIA WITHOUT BEHAVIORAL DISTURBANCE (HCC): Status: ACTIVE | Noted: 2020-04-21

## 2020-04-21 PROBLEM — I72.3 ANEURYSM, COMMON ILIAC ARTERY (HCC): Status: ACTIVE | Noted: 2020-04-21

## 2020-04-21 PROCEDURE — 3051F HG A1C>EQUAL 7.0%<8.0%: CPT | Performed by: FAMILY MEDICINE

## 2020-04-21 PROCEDURE — 99214 OFFICE O/P EST MOD 30 MIN: CPT | Performed by: FAMILY MEDICINE

## 2020-04-21 ASSESSMENT — ENCOUNTER SYMPTOMS
EYE ITCHING: 0
EYE DISCHARGE: 0
CHOKING: 0
APNEA: 0
CHEST TIGHTNESS: 0

## 2020-04-21 NOTE — PROGRESS NOTES
Patient: Alee Chapman    YOB: 1945    Date: 4/21/20       Patient Active Problem List    Diagnosis Date Noted    Abnormal ankle brachial index (ANASTACIA)      Priority: High    HTN (hypertension) 03/07/2014     Priority: High    DM (diabetes mellitus) ([facility] 09/26/2013     Priority: High    Abdominal aortic aneurysm (AAA) without rupture ([facility] 11/28/2017     Priority: Medium     Overview Note:     2.5 cm US 6/2018      PMR (polymyalgia rheumatica) ([facility] 05/19/2014     Priority: Medium    Hyperlipidemia 03/07/2014     Priority: Medium    Umbilical hernia without obstruction or gangrene 09/18/2013     Priority: Low    PAD (peripheral artery disease) ([facility] 04/21/2020    Alzheimer's dementia without behavioral disturbance ([facility] 04/21/2020    Aneurysm, common iliac artery ([facility] 04/21/2020    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[facility] 04/21/2020     Past Medical History:   Diagnosis Date    AAA (abdominal aortic aneurysm) (HCC)     Abdominal aortic aneurysm (AAA) without rupture (HCC)     Callus of foot 9/26/2013    Diastasis recti 9/18/2013    DM (diabetes mellitus) ([facility]     Gout     Gout     Hyperlipidemia     Hypertension     Neuropathy     secondary to DM    Neuropathy     PMR (polymyalgia rheumatica) ([facility] 5/19/2014    Polymyalgia rheumatica ([facility]      Past Surgical History:   Procedure Laterality Date    BACK SURGERY      CHOLECYSTECTOMY      HAND SURGERY Left 11/18/2015    dupuytren''s release with left thumb    INGUINAL HERNIA REPAIR      right side    VASCULAR SURGERY Left oct 18th 2019    lleft leg triple bypass      Social History     Socioeconomic History    Marital status:       Spouse name: Not on file    Number of children: Not on file    Years of education: Not on file    Highest education level: Not on file   Occupational History    Not on file   Social Needs    Financial resource strain: Not on file    Food insecurity     Worry: Not on file     Inability: Not on file Hypertension    Hyperlipidemia    Foot Pain     LT foot that had surgery is givng him trouble     Diarrhea     thinks aricept is causing this. states he stopped them and it went away and started them back up and it came back. HPI:    TELEHEALTH EVALUATION -- Audio/Visual (During DCMPQ-96 public health emergency)    Due to COVID 19 outbreak, patient's office visit was converted to a virtual visit. Patient was contacted and agreed to proceed with a virtual visit via [email or web address]. me  The risks and benefits of converting to a virtual visit were discussed in light of the current infectious disease epidemic. Patient also understood that insurance coverage and co-pays are up to their individual insurance plans. Pursuant to the emergency declaration under the [facility] waiver authority and the [employer] Act, this Virtual  Visit was conducted, with patient's consent, to reduce the patient's risk of exposure to COVID-19 and provide continuity of care for an established patient. Services were provided through a video discussion virtually to substitute for in-person clinic visit. Treatment Adherence:   Medication compliance:  compliant all of the time  Diet compliance:  noncompliant: eats what wife fixes  Current exercise: no regular exercise    Diabetes Mellitus Type 2: Current symptoms/problems include none and neuropathy. Home blood sugar records:  2 hrs after dinner, varies 's  Any episodes of hypoglycemia? yes - better once eats something  Tobacco history: He  reports that he quit smoking about 24 years ago. His smoking use included cigarettes. He has a 30.00 pack-year smoking history. He has never used smokeless tobacco.   Daily Aspirin?  Yes  Have you had yourannual diabetic retinal (eye) exam? Yes - Records Requested    Hypertension:  Home blood pressure monitoring: No.  Heis adherent to a low

## 2020-04-28 NOTE — TELEPHONE ENCOUNTER
Requesting medication refill.  Please approve or deny this request.    Rx requested:  Requested Prescriptions     Pending Prescriptions Disp Refills    metFORMIN (GLUCOPHAGE) 1000 MG tablet [Pharmacy Med Name: metFORMIN HCl 1000 MG Oral Tablet] 180 tablet 0     Sig: Take 1 tablet by mouth twice daily       Last Office Visit:   2/6/2020    Last Filled:  7-30-19    Last Labs:  10-    Next Visit Date:  Future Appointments   Date Time Provider Lotus Zazueta   5/11/2020 12:00 PM Marichuy Man MD [facility]

## 2020-05-05 RX ORDER — DONEPEZIL HYDROCHLORIDE 5 MG/1
5 TABLET, FILM COATED ORAL NIGHTLY
Qty: 90 TABLET | Refills: 1 | Status: SHIPPED | OUTPATIENT
Start: 2020-05-05 | End: 2020-07-21

## 2020-05-05 NOTE — TELEPHONE ENCOUNTER
Rx requested:  Requested Prescriptions     Pending Prescriptions Disp Refills    donepezil (ARICEPT) 5 MG tablet [Pharmacy Med Name: Donepezil HCl 5 MG Oral Tablet] 30 tablet 0     Sig: Take 1 tablet by mouth nightly       Last Office Visit:   2/6/2020      Last filled:  2/6/2020    Next Visit Date:  Future Appointments   Date Time Provider Lotus Zazueta   5/11/2020 12:00 PM Carmen Adam MD [facility]

## 2020-05-20 ENCOUNTER — VIRTUAL VISIT (OUTPATIENT)
Dept: INTERNAL MEDICINE | Age: 75
End: 2020-05-20
Payer: MEDICARE

## 2020-05-20 VITALS — HEIGHT: 70 IN | BODY MASS INDEX: 23.48 KG/M2 | WEIGHT: 164 LBS

## 2020-05-20 PROCEDURE — G0439 PPPS, SUBSEQ VISIT: HCPCS | Performed by: FAMILY MEDICINE

## 2020-05-20 RX ORDER — CILOSTAZOL 50 MG/1
TABLET ORAL
Qty: 180 TABLET | Refills: 0 | OUTPATIENT
Start: 2020-05-20

## 2020-05-20 RX ORDER — CLOPIDOGREL BISULFATE 75 MG/1
TABLET ORAL
Qty: 90 TABLET | Refills: 1 | Status: SHIPPED | OUTPATIENT
Start: 2020-05-20 | End: 2020-11-29

## 2020-05-20 RX ORDER — CILOSTAZOL 50 MG/1
50 TABLET ORAL 2 TIMES DAILY
Qty: 180 TABLET | Refills: 1 | Status: SHIPPED | OUTPATIENT
Start: 2020-05-20 | End: 2020-05-21 | Stop reason: SDUPTHER

## 2020-05-20 ASSESSMENT — LIFESTYLE VARIABLES
HOW OFTEN DO YOU HAVE SIX OR MORE DRINKS ON ONE OCCASION: 0
HOW MANY STANDARD DRINKS CONTAINING ALCOHOL DO YOU HAVE ON A TYPICAL DAY: 0
HOW OFTEN DO YOU HAVE A DRINK CONTAINING ALCOHOL: 3
AUDIT-C TOTAL SCORE: 3

## 2020-05-20 ASSESSMENT — PATIENT HEALTH QUESTIONNAIRE - PHQ9
SUM OF ALL RESPONSES TO PHQ QUESTIONS 1-9: 0
SUM OF ALL RESPONSES TO PHQ QUESTIONS 1-9: 0

## 2020-05-20 NOTE — PROGRESS NOTES
Medicare Annual Wellness Visit  Name: Maribel Quispe Date: 2020   MRN: [id number] Sex: Male   Age: 67 y.o. Ethnicity: Non-/Non    : 1945 Race: Romulo Canchola is here for Medicare AWV    Screenings for behavioral, psychosocial and functional/safety risks, and cognitive dysfunction are all negative except as indicated below. These results, as well as other patient data from the [address] form, are documented in Flowsheets linked to this Encounter. No Known Allergies    Prior to Visit Medications    Medication Sig Taking? Authorizing Provider   cilostazol (PLETAL) 50 MG tablet Take 1 tablet by mouth 2 times daily Yes Zurdo Castro MD   clopidogrel (PLAVIX) 75 MG tablet TAKE 1 TABLET BY MOUTH ONCE DAILY Yes Zurdo Castro MD   donepezil (ARICEPT) 5 MG tablet Take 1 tablet by mouth nightly Yes Zurdo Castro MD   metFORMIN (GLUCOPHAGE) 1000 MG tablet Take 1 tablet by mouth twice daily Yes Zurdo Castro MD   blood glucose test strips (ONE TOUCH TEST STRIPS) strip Check blood sugar  twice daily As needed. Zurdo Castro MD   Lancets MISC Test bid  Zurdo Castro MD       Past Medical History:   Diagnosis Date    AAA (abdominal aortic aneurysm) ([address]     Abdominal aortic aneurysm (AAA) without rupture ([address]     Callus of foot 2013    Diastasis recti 2013    DM (diabetes mellitus) ([address]     Gout     Gout     Hyperlipidemia     Hypertension     Neuropathy     secondary to DM    Neuropathy     PMR (polymyalgia rheumatica) ([address] 2014    Polymyalgia rheumatica ([address]        Past Surgical History:   Procedure Laterality Date    BACK SURGERY      CHOLECYSTECTOMY      HAND SURGERY Left 2015    dupuytren''s release with left thumb    INGUINAL HERNIA REPAIR      right side    VASCULAR SURGERY Left oct 18th 2019    lleft leg triple bypass        No family history on file.     CareTeam (Including outside providers/suppliers regularly involved in providing care):   Patient Care Team:  Leiv Gerardo MD as PCP - General (Family Medicine)  Levi Gerardo MD as PCP - [facility] Empaneled Provider  Coral Geiger MD as Consulting Physician (Rheumatology)  Alejandrina Jones DO as Cardiologist (Cardiology)    Wt Readings from Last 3 Encounters:   05/20/20 164 lb (74.4 kg)   02/18/20 160 lb (72.6 kg)   02/06/20 163 lb (73.9 kg)     Vitals:    05/20/20 0856   Weight: 164 lb (74.4 kg)   Height: 5' 10\" (1.778 m)     Body mass index is 23.53 kg/m². Based upon direct observation of the patient, evaluation of cognition reveals recent and remote memory intact. Patient's complete Health Risk Assessment and screening values have been reviewed and are found in Flowsheets. The following problems were reviewed today and where indicated follow up appointments were made and/or referrals ordered. Positive Risk Factor Screenings with Interventions:     Cognitive: Words recalled: 2 Words Recalled  Total Score Interpretation: Positive Mini-Cog  Cognitive Impairment Interventions:  · he is on aricept, had dementia work up    Health Habits/Nutrition:  Health Habits/Nutrition  Do you exercise for at least 20 minutes 2-3 times per week?: Yes  Have you lost any weight without trying in the past 3 months?: No  Do you eat fewer than 2 meals per day?: No  Have you seen a dentist within the past year?: (!) No  Body mass index is 23.53 kg/m².   Health Habits/Nutrition Interventions:  · Dental exam overdue:  patient encouraged to make appointment with his/her dentist    Hearing/Vision:  No exam data present  Hearing/Vision  Do you or your family notice any trouble with your hearing?: (!) Yes  Do you have difficulty driving, watching TV, or doing any of your daily activities because of your eyesight?: No  Have you had an eye exam within the past year?: Yes  Hearing/Vision Interventions:  · Hearing concerns:  patient declines any further evaluation/treatment for hearing

## 2020-05-21 RX ORDER — CILOSTAZOL 50 MG/1
50 TABLET ORAL 2 TIMES DAILY
Qty: 180 TABLET | Refills: 1 | Status: SHIPPED | OUTPATIENT
Start: 2020-05-21 | End: 2020-11-18

## 2020-06-19 ENCOUNTER — APPOINTMENT (OUTPATIENT)
Dept: CT IMAGING | Age: 75
End: 2020-06-19
Payer: MEDICARE

## 2020-06-19 ENCOUNTER — APPOINTMENT (OUTPATIENT)
Dept: GENERAL RADIOLOGY | Age: 75
End: 2020-06-19
Payer: MEDICARE

## 2020-06-19 ENCOUNTER — NURSE TRIAGE (OUTPATIENT)
Dept: OTHER | Facility: CLINIC | Age: 75
End: 2020-06-19

## 2020-06-19 ENCOUNTER — VIRTUAL VISIT (OUTPATIENT)
Dept: INTERNAL MEDICINE | Age: 75
End: 2020-06-19
Payer: MEDICARE

## 2020-06-19 ENCOUNTER — HOSPITAL ENCOUNTER (EMERGENCY)
Age: 75
Discharge: HOME | End: 2020-06-19
Attending: EMERGENCY MEDICINE
Payer: MEDICARE

## 2020-06-19 VITALS
WEIGHT: 165 LBS | DIASTOLIC BLOOD PRESSURE: 77 MMHG | BODY MASS INDEX: 23.62 KG/M2 | HEIGHT: 70 IN | HEART RATE: 67 BPM | TEMPERATURE: 98.1 F | RESPIRATION RATE: 18 BRPM | OXYGEN SATURATION: 100 % | SYSTOLIC BLOOD PRESSURE: 153 MMHG

## 2020-06-19 LAB
ALBUMIN SERPL-MCNC: 4.6 G/DL (ref 3.5–4.6)
ALP BLD-CCNC: 60 U/L (ref 35–104)
ALT SERPL-CCNC: 13 U/L (ref 0–41)
ANION GAP SERPL CALCULATED.3IONS-SCNC: 14 MEQ/L (ref 9–15)
AST SERPL-CCNC: 13 U/L (ref 0–40)
BILIRUB SERPL-MCNC: 0.3 MG/DL (ref 0.2–0.7)
BUN BLDV-MCNC: 26 MG/DL (ref 8–23)
CALCIUM SERPL-MCNC: 10.4 MG/DL (ref 8.5–9.9)
CHLORIDE BLD-SCNC: 98 MEQ/L (ref 95–107)
CO2: 22 MEQ/L (ref 20–31)
CREAT SERPL-MCNC: 1.12 MG/DL (ref 0.7–1.2)
GFR AFRICAN AMERICAN: >60
GFR NON-AFRICAN AMERICAN: >60
GLOBULIN: 3.1 G/DL (ref 2.3–3.5)
GLUCOSE BLD-MCNC: 205 MG/DL (ref 70–99)
HCT VFR BLD CALC: 38 % (ref 42–52)
HEMOGLOBIN: 12.8 G/DL (ref 14–18)
INR BLD: 0.9
MCH RBC QN AUTO: 29.5 PG (ref 27–31.3)
MCHC RBC AUTO-ENTMCNC: 33.6 % (ref 33–37)
MCV RBC AUTO: 87.7 FL (ref 80–100)
PDW BLD-RTO: 14.6 % (ref 11.5–14.5)
PLATELET # BLD: 253 K/UL (ref 130–400)
POTASSIUM SERPL-SCNC: 4.3 MEQ/L (ref 3.4–4.9)
PROTHROMBIN TIME: 12.5 SEC (ref 12.3–14.9)
RBC # BLD: 4.33 M/UL (ref 4.7–6.1)
SODIUM BLD-SCNC: 134 MEQ/L (ref 135–144)
TOTAL PROTEIN: 7.7 G/DL (ref 6.3–8)
WBC # BLD: 5.6 K/UL (ref 4.8–10.8)

## 2020-06-19 PROCEDURE — 6360000002 HC RX W HCPCS: Performed by: EMERGENCY MEDICINE

## 2020-06-19 PROCEDURE — 85610 PROTHROMBIN TIME: CPT

## 2020-06-19 PROCEDURE — 94640 AIRWAY INHALATION TREATMENT: CPT

## 2020-06-19 PROCEDURE — 6360000004 HC RX CONTRAST MEDICATION: Performed by: EMERGENCY MEDICINE

## 2020-06-19 PROCEDURE — 71100 X-RAY EXAM RIBS UNI 2 VIEWS: CPT

## 2020-06-19 PROCEDURE — 80053 COMPREHEN METABOLIC PANEL: CPT

## 2020-06-19 PROCEDURE — 71275 CT ANGIOGRAPHY CHEST: CPT

## 2020-06-19 PROCEDURE — 96374 THER/PROPH/DIAG INJ IV PUSH: CPT

## 2020-06-19 PROCEDURE — 99285 EMERGENCY DEPT VISIT HI MDM: CPT

## 2020-06-19 PROCEDURE — 36415 COLL VENOUS BLD VENIPUNCTURE: CPT

## 2020-06-19 PROCEDURE — 6370000000 HC RX 637 (ALT 250 FOR IP): Performed by: EMERGENCY MEDICINE

## 2020-06-19 PROCEDURE — 85027 COMPLETE CBC AUTOMATED: CPT

## 2020-06-19 PROCEDURE — 99213 OFFICE O/P EST LOW 20 MIN: CPT | Performed by: PHYSICIAN ASSISTANT

## 2020-06-19 PROCEDURE — 71046 X-RAY EXAM CHEST 2 VIEWS: CPT

## 2020-06-19 RX ORDER — AZITHROMYCIN 250 MG/1
TABLET, FILM COATED ORAL
Qty: 6 TABLET | Refills: 0 | Status: SHIPPED | OUTPATIENT
Start: 2020-06-19 | End: 2020-06-29

## 2020-06-19 RX ORDER — IPRATROPIUM BROMIDE AND ALBUTEROL SULFATE 2.5; .5 MG/3ML; MG/3ML
1 SOLUTION RESPIRATORY (INHALATION) ONCE
Status: COMPLETED | OUTPATIENT
Start: 2020-06-19 | End: 2020-06-19

## 2020-06-19 RX ORDER — ALBUTEROL SULFATE 90 UG/1
AEROSOL, METERED RESPIRATORY (INHALATION)
Qty: 1 INHALER | Refills: 1 | Status: SHIPPED | OUTPATIENT
Start: 2020-06-19 | End: 2021-05-25 | Stop reason: ALTCHOICE

## 2020-06-19 RX ORDER — TRAMADOL HYDROCHLORIDE 50 MG/1
50 TABLET ORAL EVERY 8 HOURS PRN
Qty: 20 TABLET | Refills: 0 | Status: SHIPPED | OUTPATIENT
Start: 2020-06-19 | End: 2020-06-25

## 2020-06-19 RX ORDER — KETOROLAC TROMETHAMINE 30 MG/ML
30 INJECTION, SOLUTION INTRAMUSCULAR; INTRAVENOUS ONCE
Status: COMPLETED | OUTPATIENT
Start: 2020-06-19 | End: 2020-06-19

## 2020-06-19 RX ADMIN — IPRATROPIUM BROMIDE AND ALBUTEROL SULFATE 1 AMPULE: .5; 3 SOLUTION RESPIRATORY (INHALATION) at 17:23

## 2020-06-19 RX ADMIN — IOPAMIDOL 100 ML: 755 INJECTION, SOLUTION INTRAVENOUS at 16:57

## 2020-06-19 RX ADMIN — KETOROLAC TROMETHAMINE 30 MG: 30 INJECTION, SOLUTION INTRAMUSCULAR at 16:52

## 2020-06-19 ASSESSMENT — ENCOUNTER SYMPTOMS
SINUS PRESSURE: 0
CONSTIPATION: 0
COUGH: 1
EYE PAIN: 0
STRIDOR: 0
FACIAL SWELLING: 0
EYE DISCHARGE: 0
CHOKING: 0
ABDOMINAL PAIN: 0
WHEEZING: 0
BACK PAIN: 0
VOMITING: 0
BLOOD IN STOOL: 0
TROUBLE SWALLOWING: 0
DIARRHEA: 0
EYE REDNESS: 0
SORE THROAT: 0
CHEST TIGHTNESS: 0
VOICE CHANGE: 0
SHORTNESS OF BREATH: 0

## 2020-06-19 ASSESSMENT — PAIN DESCRIPTION - PROGRESSION: CLINICAL_PROGRESSION: NOT CHANGED

## 2020-06-19 ASSESSMENT — PAIN DESCRIPTION - ORIENTATION: ORIENTATION: RIGHT

## 2020-06-19 ASSESSMENT — PAIN DESCRIPTION - ONSET: ONSET: ON-GOING

## 2020-06-19 ASSESSMENT — PAIN SCALES - GENERAL
PAINLEVEL_OUTOF10: 4
PAINLEVEL_OUTOF10: 2

## 2020-06-19 ASSESSMENT — PAIN DESCRIPTION - LOCATION: LOCATION: RIB CAGE;BACK

## 2020-06-19 ASSESSMENT — PAIN DESCRIPTION - DESCRIPTORS: DESCRIPTORS: SORE;NAGGING

## 2020-06-19 ASSESSMENT — PAIN - FUNCTIONAL ASSESSMENT: PAIN_FUNCTIONAL_ASSESSMENT: PREVENTS OR INTERFERES SOME ACTIVE ACTIVITIES AND ADLS

## 2020-06-19 ASSESSMENT — PAIN DESCRIPTION - FREQUENCY: FREQUENCY: CONTINUOUS

## 2020-06-19 NOTE — TELEPHONE ENCOUNTER
ED.    9. OTHER SYMPTOMS: \"Do you have any other symptoms? (e.g., dizziness, runny nose, cough, chest pain, fever)      Dizziness today, but still able to walk. Runny nose, cough, rib pain. Denies a fever. Coughing up white mucus. 10. PREGNANCY: \"Is there any chance you are pregnant? \" \"When was your last menstrual period? \"        N/a    11. TRAVEL: \"Have you traveled out of the country in the last month? \" (e.g., travel history, exposures)        No    Protocols used: BREATHING DIFFICULTY-ADULT-[place]    Pod 1    Received call from [address]. Call soft transferred to [address] to schedule appointment. Please do not reply to the triage nurse through this encounter. Any subsequent communication should be directly with the patient.

## 2020-06-19 NOTE — ED PROVIDER NOTES
Patient has no known allergies. FAMILY HISTORY     History reviewed. No pertinent family history. SOCIAL HISTORY       Social History     Socioeconomic History    Marital status:      Spouse name: None    Number of children: None    Years of education: None    Highest education level: None   Occupational History    None   Social Needs    Financial resource strain: None    Food insecurity     Worry: None     Inability: None    Transportation needs     Medical: None     Non-medical: None   Tobacco Use    Smoking status: Former Smoker     Packs/day: 1.00     Years: 30.00     Pack years: 30.00     Types: Cigarettes     Last attempt to quit: 1995     Years since quittin.5    Smokeless tobacco: Never Used   Substance and Sexual Activity    Alcohol use: No     Alcohol/week: 0.0 standard drinks    Drug use: No    Sexual activity: Not Currently   Lifestyle    Physical activity     Days per week: None     Minutes per session: None    Stress: None   Relationships    Social connections     Talks on phone: None     Gets together: None     Attends Anabaptism service: None     Active member of club or organization: None     Attends meetings of clubs or organizations: None     Relationship status: None    Intimate partner violence     Fear of current or ex partner: None     Emotionally abused: None     Physically abused: None     Forced sexual activity: None   Other Topics Concern    None   Social History Narrative    None       SCREENINGS      @FLOW([id number])@      PHYSICAL EXAM    (up to 7 for level 4, 8 or more for level 5)     ED Triage Vitals [20 1601]   BP Temp Temp src Pulse Resp SpO2 Height Weight   (!) 124/57 98.1 °F (36.7 °C) -- 77 18 97 % 5' 10\" (1.778 m) 165 lb (74.8 kg)       Physical Exam  Vitals signs and nursing note reviewed. Constitutional:       General: He is not in acute distress. Appearance: Normal appearance. He is well-developed and normal weight.  He required my urgent intervention. ONSULTS:  None    PROCEDURES:  Unless otherwise noted below, none     Procedures    FINAL IMPRESSION      1. Fall, initial encounter    2. Rib contusion, right, initial encounter    3. Cough          DISPOSITION/PLAN   DISPOSITION Decision To Discharge 06/19/2020 06:12:10 PM      PATIENT REFERRED TO:  Carmen Adam MD  [address] [place] [address]  [phone]    In 1 week        DISCHARGE MEDICATIONS:  New Prescriptions    ALBUTEROL SULFATE HFA (PROAIR HFA) 108 (90 BASE) MCG/ACT INHALER    Use every 4 hours while awake for 7-10 days then PRN wheezing  Dispense with SPACER and Instruct on use. May sub Ventolin or Proventil as needed per [employer]. AZITHROMYCIN (ZITHROMAX) 250 MG TABLET    2 TABS DAY 1 THEN 1 TAB DAYS 2-5    TRAMADOL (ULTRAM) 50 MG TABLET    Take 1 tablet by mouth every 8 hours as needed for Pain for up to 6 days.           (Please note that portions of this note were completed with a voice recognition program.  Efforts were made to edit the dictations but occasionally words are mis-transcribed.)    Conner Louis MD (electronically signed)  Attending Emergency Physician       Conner Louis MD  06/19/20 4261

## 2020-06-19 NOTE — PROGRESS NOTES
azithromycin (ZITHROMAX) 250 MG tablet 2 TABS DAY 1 THEN 1 TAB DAYS 2-5  Heath Lara MD   cilostazol (PLETAL) 50 MG tablet Take 1 tablet by mouth 2 times daily  Joshua Watson, DO   clopidogrel (PLAVIX) 75 MG tablet TAKE 1 TABLET BY MOUTH ONCE DAILY  Nilesh Osborn MD   donepezil (ARICEPT) 5 MG tablet Take 1 tablet by mouth nightly  Nilesh Osborn MD   metFORMIN (GLUCOPHAGE) 1000 MG tablet Take 1 tablet by mouth twice daily  Nilesh Osborn MD   blood glucose test strips (ONE TOUCH TEST STRIPS) strip Check blood sugar  twice daily As needed.   Nilesh Osborn MD   Lancets MISC Test bid  Nilesh Osborn MD       Social History     Tobacco Use    Smoking status: Former Smoker     Packs/day: 1.00     Years: 30.00     Pack years: 30.00     Types: Cigarettes     Last attempt to quit: 1995     Years since quittin.5    Smokeless tobacco: Never Used   Substance Use Topics    Alcohol use: No     Alcohol/week: 0.0 standard drinks    Drug use: No        No Known Allergies,   Past Medical History:   Diagnosis Date    [facility]     [facility]     Callus of foot 2013    Diastasis recti 2013    DM (diabetes mellitus) [facility]     Gout     Gout     Hyperlipidemia     Hypertension     Neuropathy     secondary to DM    Neuropathy     PMR (polymyalgia rheumatica) ([facility] 2014    [facility]    ,   Past Surgical History:   Procedure Laterality Date    BACK SURGERY      CHOLECYSTECTOMY      HAND SURGERY Left 2015    dupuytren''s release with left thumb    INGUINAL HERNIA REPAIR      right side    VASCULAR SURGERY Left oct 18th 2019    lleft leg triple bypass        PHYSICAL EXAMINATION:  [ INSTRUCTIONS:  \"[x]\" Indicates a positive item  \"[]\" Indicates a negative item  -- DELETE ALL ITEMS NOT EXAMINED]  [x] Alert  [] Oriented to person/place/time    [x] No apparent distress  [] Toxic appearing    []

## 2020-06-22 ASSESSMENT — ENCOUNTER SYMPTOMS
ABDOMINAL PAIN: 0
COLOR CHANGE: 1
COUGH: 1
SHORTNESS OF BREATH: 1
BACK PAIN: 0
WHEEZING: 0

## 2020-07-14 ENCOUNTER — HOSPITAL ENCOUNTER (OUTPATIENT)
Dept: GENERAL RADIOLOGY | Age: 75
Discharge: HOME | End: 2020-07-16
Payer: MEDICARE

## 2020-07-14 ENCOUNTER — HOSPITAL ENCOUNTER (OUTPATIENT)
Dept: LAB | Age: 75
Discharge: HOME | End: 2020-07-14
Payer: MEDICARE

## 2020-07-14 ENCOUNTER — OFFICE VISIT (OUTPATIENT)
Dept: INTERNAL MEDICINE | Age: 75
End: 2020-07-14
Payer: MEDICARE

## 2020-07-14 ENCOUNTER — NURSE ONLY (OUTPATIENT)
Dept: PRIMARY CARE CLINIC | Age: 75
End: 2020-07-14

## 2020-07-14 ENCOUNTER — TELEPHONE (OUTPATIENT)
Dept: INTERNAL MEDICINE | Age: 75
End: 2020-07-14

## 2020-07-14 ENCOUNTER — HOSPITAL ENCOUNTER (OUTPATIENT)
Age: 75
Discharge: HOME | End: 2020-07-16
Payer: MEDICARE

## 2020-07-14 VITALS
BODY MASS INDEX: 22.96 KG/M2 | SYSTOLIC BLOOD PRESSURE: 130 MMHG | TEMPERATURE: 98.1 F | DIASTOLIC BLOOD PRESSURE: 60 MMHG | OXYGEN SATURATION: 96 % | HEART RATE: 76 BPM | WEIGHT: 160.4 LBS | HEIGHT: 70 IN

## 2020-07-14 DIAGNOSIS — R05.9 COUGH: ICD-10-CM

## 2020-07-14 DIAGNOSIS — R06.02 SOB (SHORTNESS OF BREATH): ICD-10-CM

## 2020-07-14 LAB
ALBUMIN SERPL-MCNC: 4.7 G/DL (ref 3.5–4.6)
ALP BLD-CCNC: 64 U/L (ref 35–104)
ALT SERPL-CCNC: 9 U/L (ref 0–41)
ANION GAP SERPL CALCULATED.3IONS-SCNC: 14 MEQ/L (ref 9–15)
AST SERPL-CCNC: 15 U/L (ref 0–40)
BASOPHILS ABSOLUTE: 0.1 K/UL (ref 0–0.2)
BASOPHILS RELATIVE PERCENT: 0.7 %
BILIRUB SERPL-MCNC: 0.3 MG/DL (ref 0.2–0.7)
BUN BLDV-MCNC: 20 MG/DL (ref 8–23)
CALCIUM SERPL-MCNC: 10.2 MG/DL (ref 8.5–9.9)
CHLORIDE BLD-SCNC: 100 MEQ/L (ref 95–107)
CO2: 23 MEQ/L (ref 20–31)
CREAT SERPL-MCNC: 1.04 MG/DL (ref 0.7–1.2)
EOSINOPHILS ABSOLUTE: 0.1 K/UL (ref 0–0.7)
EOSINOPHILS RELATIVE PERCENT: 1.9 %
GFR AFRICAN AMERICAN: >60
GFR NON-AFRICAN AMERICAN: >60
GLOBULIN: 2.7 G/DL (ref 2.3–3.5)
GLUCOSE BLD-MCNC: 197 MG/DL (ref 70–99)
HCT VFR BLD CALC: 38.8 % (ref 42–52)
HEMOGLOBIN: 12.9 G/DL (ref 14–18)
LYMPHOCYTES ABSOLUTE: 1.6 K/UL (ref 1–4.8)
LYMPHOCYTES RELATIVE PERCENT: 23.4 %
MCH RBC QN AUTO: 29.5 PG (ref 27–31.3)
MCHC RBC AUTO-ENTMCNC: 33.3 % (ref 33–37)
MCV RBC AUTO: 88.8 FL (ref 80–100)
MONOCYTES ABSOLUTE: 0.8 K/UL (ref 0.2–0.8)
MONOCYTES RELATIVE PERCENT: 10.9 %
NEUTROPHILS ABSOLUTE: 4.4 K/UL (ref 1.4–6.5)
NEUTROPHILS RELATIVE PERCENT: 63.1 %
PDW BLD-RTO: 14.6 % (ref 11.5–14.5)
PLATELET # BLD: 228 K/UL (ref 130–400)
POTASSIUM SERPL-SCNC: 4.2 MEQ/L (ref 3.4–4.9)
RBC # BLD: 4.37 M/UL (ref 4.7–6.1)
SODIUM BLD-SCNC: 137 MEQ/L (ref 135–144)
TOTAL PROTEIN: 7.4 G/DL (ref 6.3–8)
WBC # BLD: 7 K/UL (ref 4.8–10.8)

## 2020-07-14 PROCEDURE — 99214 OFFICE O/P EST MOD 30 MIN: CPT | Performed by: FAMILY MEDICINE

## 2020-07-14 PROCEDURE — 85025 COMPLETE CBC W/AUTO DIFF WBC: CPT

## 2020-07-14 PROCEDURE — 36415 COLL VENOUS BLD VENIPUNCTURE: CPT

## 2020-07-14 PROCEDURE — 71046 X-RAY EXAM CHEST 2 VIEWS: CPT

## 2020-07-14 PROCEDURE — 80053 COMPREHEN METABOLIC PANEL: CPT

## 2020-07-14 ASSESSMENT — ENCOUNTER SYMPTOMS
COUGH: 1
WHEEZING: 1
SHORTNESS OF BREATH: 1

## 2020-07-14 NOTE — Clinical Note
Daughter mentions pt has some more needs at home, anything we can help with? Thank you!     Yuliana Bro MD
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
## 2020-07-14 NOTE — PROGRESS NOTES
Patient: Juliet Barrios    YOB: 1945    Date: 7/14/20       Patient Active Problem List    Diagnosis Date Noted    PAD (peripheral artery disease) ([facility] 04/21/2020     Priority: High    Alzheimer's dementia without behavioral disturbance ([facility] 04/21/2020     Priority: High    Abnormal ankle brachial index ([place])      Priority: High    HTN (hypertension) 03/07/2014     Priority: High    DM (diabetes mellitus) ([facility] 09/26/2013     Priority: High    Aneurysm, common iliac artery ([facility] 04/21/2020     Priority: Medium    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[facility] 04/21/2020     Priority: Medium    Abdominal aortic aneurysm (AAA) without rupture ([facility] 11/28/2017     Priority: Medium     Overview Note:     2.5 cm US 6/2018      PMR (polymyalgia rheumatica) ([facility] 05/19/2014     Priority: Medium    Hyperlipidemia 03/07/2014     Priority: Medium    Umbilical hernia without obstruction or gangrene 09/18/2013     Priority: Low     Past Medical History:   Diagnosis Date    AAA (abdominal aortic aneurysm) ([facility])     Abdominal aortic aneurysm (AAA) without rupture ([facility])     Callus of foot 9/26/2013    Diastasis recti 9/18/2013    DM (diabetes mellitus) [facility]     Gout     Gout     Hyperlipidemia     Hypertension     Neuropathy     secondary to DM    Neuropathy     PMR (polymyalgia rheumatica) ([facility] 5/19/2014    Polymyalgia rheumatica ([facility]      Past Surgical History:   Procedure Laterality Date    BACK SURGERY      CHOLECYSTECTOMY      HAND SURGERY Left 11/18/2015    dupuytren''s release with left thumb    INGUINAL HERNIA REPAIR      right side    VASCULAR SURGERY Left oct 18th 2019    lleft leg triple bypass      History reviewed. No pertinent family history. Social History     Socioeconomic History    Marital status:       Spouse name: Not on file    Number of children: Not on file    Years of education: Not on file    Highest education level: Not on file   Occupational History    Not on file Social Needs    Financial resource strain: Not on file    Food insecurity     Worry: Not on file     Inability: Not on file    Transportation needs     Medical: Not on file     Non-medical: Not on file   Tobacco Use    Smoking status: Former Smoker     Packs/day: 1.00     Years: 30.00     Pack years: 30.00     Types: Cigarettes     Last attempt to quit: 1995     Years since quittin.6    Smokeless tobacco: Never Used   Substance and Sexual Activity    Alcohol use: No     Alcohol/week: 0.0 standard drinks    Drug use: No    Sexual activity: Not Currently   Lifestyle    Physical activity     Days per week: Not on file     Minutes per session: Not on file    Stress: Not on file   Relationships    Social connections     Talks on phone: Not on file     Gets together: Not on file     Attends Episcopal service: Not on file     Active member of club or organization: Not on file     Attends meetings of clubs or organizations: Not on file     Relationship status: Not on file    Intimate partner violence     Fear of current or ex partner: Not on file     Emotionally abused: Not on file     Physically abused: Not on file     Forced sexual activity: Not on file   Other Topics Concern    Not on file   Social History Narrative    Not on file     Current Outpatient Medications on File Prior to Visit   Medication Sig Dispense Refill    albuterol sulfate HFA (PROAIR HFA) 108 (90 Base) MCG/ACT inhaler Use every 4 hours while awake for 7-10 days then PRN wheezing  Dispense with SPACER and Instruct on use. May sub Ventolin or Proventil as needed per [employer].  1 Inhaler 1    cilostazol (PLETAL) 50 MG tablet Take 1 tablet by mouth 2 times daily 180 tablet 1    clopidogrel (PLAVIX) 75 MG tablet TAKE 1 TABLET BY MOUTH ONCE DAILY 90 tablet 1    donepezil (ARICEPT) 5 MG tablet Take 1 tablet by mouth nightly 90 tablet 1    metFORMIN (GLUCOPHAGE) 1000 MG tablet Take 1 tablet by mouth twice daily 180 tablet 1    blood glucose test strips (ONE TOUCH TEST STRIPS) strip Check blood sugar  twice daily As needed. 300 each 3    Lancets MISC Test bid 300 each 3     No current facility-administered medications on file prior to visit. No Known Allergies    Chief Complaint   Patient presents with    Dizziness     x 1 month states if he is in a store and looking at top shelf, then look down, then looks up he gets dizzy, feels like the room is spinning. when he takes the dog for a slow walk around the house he feels like he had a couple too many drinks    Shortness of Breath     since he broke his ribs, however no images show any fractures.  PT was sent to ear last month by Holly Castillo       HPI    Patient's daughter called today with concerns regarding her dad, at which point patient was scheduled to see me in office today  Per patient's daughter, patient has been more short of breath and having recurrent falls    Per patient his shortness of breath started after right lower rib injury  He was sitting on porch with dog, when he got up tripped over the dog bed, when falling had his fist drive into his right lower rib  Since then has sharp pain in right lower rib area, worse with taking a deep breath  Did video visit with Dominik Taylor, was sent to ER  Seen at the ER on 6/19  Feels like trying to cough up something, hard time coming up  Has SOB and trouble breathing    Has been having hip pain, B/L hips  Goes down the left leg   Heating pad helps   Has had ongoing issues with sciatica for years  He is a very active person, works on the farm all day  Usually needs support, something to hold onto    He notes falling more lately, some episodes of vertigo, but also complains of legs giving out  No LE Edema   He says he feels like \"spinning\", gets warm, and then feels like passing out -episodes come and go in spurts    No fevers  No body aches, back and legs hurt  +headaches  + Cough  + SOB    No sick contacts  No recent travel history    Patient is also experiencing change in bowel habits  He has had loose stools and then times of hard time with BM's  No belly pain  Some weight loss  He believes he has been eating the same and consistently    CT chest from ER:  FINDINGS:         There are no filling defects identified within the pulmonary arterial vasculature to suggest pulmonary emboli.         The thoracic aorta is normal in caliber with mild to moderate atherosclerotic plaquing of the arch. There is no dissection. The heart is not enlarged.         Minimal probable bibasilar atelectasis is present posteriorly.         There are no other significant pulmonary infiltrates, worrisome nodules, lymphadenopathy, pleural or pericardial effusions identified.         Mild degenerative changes of the thoracic spine are noted.  There are no fractures identified.         The limited images of the upper abdomen are noncontributory.                                  Impression         NO EVIDENCE OF PULMONARY EMBOLI, OR ACUTE FINDINGS IDENTIFIED IN THE CHEST. Wt Readings from Last 3 Encounters:   07/14/20 160 lb 6.4 oz (72.8 kg)   06/19/20 165 lb (74.8 kg)   05/20/20 164 lb (74.4 kg)         Review of Systems   Constitutional: Negative for activity change, appetite change and chills. HENT: Positive for congestion. Negative for dental problem and drooling. Respiratory: Positive for cough, shortness of breath and wheezing. Neurological: Positive for dizziness and light-headedness. Negative for seizures. Physical Exam  Vitals:    07/14/20 1259   BP: 130/60   Pulse: 76   Temp: 98.1 °F (36.7 °C)   SpO2: 96%   Body mass index is 23.02 kg/m². Physical Exam  Constitutional:  Appears well-developed and well-nourished. No distress. HENT:   Head: Normocephalic and atraumatic.    Right Ear: External ear normal.   Left Ear: External ear normal.   Nose: Nose normal.   Eyes: Conjunctivae and EOM are normal.  Right eye exhibits no discharge. Left eye exhibits no discharge. No scleral icterus. Neck: Normal range of motion. Cardiovascular: Normal rate, regular rhythm and normal heart sounds. Pulmonary/Chest: Effort normal and breath sounds normal. No respiratory distress. no wheezes. no rales. no tenderness. Musculoskeletal: Normal range of motion. Pain with palpation of right lower rib cage area  Neurological: alert. Skin: not diaphoretic. Psychiatric: normal mood and affect. behavior is normal. Judgment and thought content normal.   Nursing note and vitals reviewed. Assessment:  Odin Hi was seen today for dizziness and shortness of breath. Diagnoses and all orders for this visit:    SOB (shortness of breath)  -     CBC Auto Differential; Future  -     Comprehensive Metabolic Panel; Future  -     XR CHEST STANDARD (2 VW); Future  -     Covid-19 Ambulatory; Future  Low suspicion for COVID however I would like to rule this out  Check labs  Repeat chest x-ray rule out pneumonia  CT scan done in the ER was clear    Change in bowel habits  -     Ambulatory referral to Gastroenterology  Discussed need for colonoscopy, patient says he will think about it but will get it done if I think this is necessary, advised to have a discussion with GI to see if this is something that needs done    Rib pain on right side  -     XR CHEST STANDARD (2 VW); Future    Frequent falls  -     Ambulatory referral to Neurology  Dizziness  -     Ambulatory referral to Neurology  Patient's recurrent falls seem multifactorial  .  Partially from vertigo spells, from weakness in the legs due to osteoarthritis and degenerative lumbar disc disease. Referral to neurology    Cough  -     Covid-19 Ambulatory;  Future          Orders Placed This Encounter   Procedures    XR CHEST STANDARD (2 VW)     Standing Status:   Future     Number of Occurrences:   1     Standing Expiration Date:   7/14/2021     Order Specific Question:   Reason for exam:     Answer:   SOB  CBC Auto Differential     Standing Status:   Future     Number of Occurrences:   1     Standing Expiration Date:   10/14/2020    Comprehensive Metabolic Panel     Standing Status:   Future     Number of Occurrences:   1     Standing Expiration Date:   10/14/2020    Covid-19 Ambulatory     Standing Status:   Future     Standing Expiration Date:   10/14/2020     Scheduling Instructions:      Saline media preferred given current shortage of viral transport media but both acceptable     Order Specific Question:   Status     Answer:   Symptomatic/Infection Suspected    Ambulatory referral to Gastroenterology     Referral Priority:   Routine     Referral Type:   Eval and Treat     Referral Reason:   Specialty Services Required     Referred to Provider:   Dagoberto Lei MD     Requested Specialty:   Gastroenterology     Number of Visits Requested:   1    Ambulatory referral to Neurology     Referral Priority:   Routine     Referral Type:   Eval and Treat     Referral Reason:   Specialty Services Required     Referred to Provider:   Britt Silva MD     Requested Specialty:   Neurology     Number of Visits Requested:   1         Return in about 1 week (around 7/21/2020).

## 2020-07-15 ENCOUNTER — TELEPHONE (OUTPATIENT)
Dept: INTERNAL MEDICINE | Age: 75
End: 2020-07-15

## 2020-07-16 RX ORDER — MECLIZINE HYDROCHLORIDE 25 MG/1
25 TABLET ORAL 3 TIMES DAILY PRN
Qty: 30 TABLET | Refills: 0 | Status: SHIPPED | OUTPATIENT
Start: 2020-07-16 | End: 2020-08-13

## 2020-07-16 NOTE — PROGRESS NOTES
Requested Prescriptions     Signed Prescriptions Disp Refills    meclizine (ANTIVERT) 25 MG tablet 30 tablet 0     Sig: Take 1 tablet by mouth 3 times daily as needed for Dizziness or Nausea CAUTION: NOT BEST SUITED FOR OVER AGE 65 DUE TO CAUSING DROWSINESS, USE CAREFULLY         Thank you!     Nona Virgen MD
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
## 2020-07-16 NOTE — TELEPHONE ENCOUNTER
We can try the medicine, increase fluid intake and wait for neuro appt  other option is take him to ER    So far my work up was negative aside from the old rib fracture. when I saw the pt in office he said he was doing fine aside from the breathing due to pain from rib fracture. He did say he has some dizziness but nothing severe or debilitating . Thank you!     Nona Virgen MD

## 2020-07-18 LAB
SARS-COV-2: NOT DETECTED
SOURCE: NORMAL

## 2020-07-20 ENCOUNTER — TELEPHONE (OUTPATIENT)
Dept: INTERNAL MEDICINE | Age: 75
End: 2020-07-20

## 2020-07-20 ENCOUNTER — CARE COORDINATION (OUTPATIENT)
Dept: CARE COORDINATION | Age: 75
End: 2020-07-20

## 2020-07-20 NOTE — TELEPHONE ENCOUNTER
Pt's daughter called stating that pt is experiencing anxiety and was previously on medication for anxiety. She will not be at the office visit with pt on 07/21/20 and wanted the provider aware.   PETTY

## 2020-07-20 NOTE — CARE COORDINATION
Received message to contact patients daughter as he may need some help in the home. Called and spoke with Collette Unger who shared that her father has his own place at this time, but has been living with her. Collette Unger shared that patient has issues with falling and has some memory issues. Collette Unger reports that she has bene managing with cooking and helping her father at this time, but would like any resources that may help in future. Discussed privet pay options for help in home and also shared info on [employer]. Will mail out this information.

## 2020-07-21 ENCOUNTER — OFFICE VISIT (OUTPATIENT)
Dept: INTERNAL MEDICINE | Age: 75
End: 2020-07-21
Payer: MEDICARE

## 2020-07-21 VITALS
DIASTOLIC BLOOD PRESSURE: 78 MMHG | HEIGHT: 70 IN | OXYGEN SATURATION: 98 % | TEMPERATURE: 97.2 F | HEART RATE: 78 BPM | WEIGHT: 164.6 LBS | BODY MASS INDEX: 23.56 KG/M2 | SYSTOLIC BLOOD PRESSURE: 134 MMHG

## 2020-07-21 PROCEDURE — 99214 OFFICE O/P EST MOD 30 MIN: CPT | Performed by: FAMILY MEDICINE

## 2020-07-21 ASSESSMENT — ENCOUNTER SYMPTOMS
DIARRHEA: 1
WHEEZING: 1
COUGH: 0
BLOOD IN STOOL: 0
SHORTNESS OF BREATH: 1
CONSTIPATION: 0

## 2020-07-21 NOTE — PROGRESS NOTES
on file   Tobacco Use    Smoking status: Former Smoker     Packs/day: 1.00     Years: 30.00     Pack years: 30.00     Types: Cigarettes     Last attempt to quit: 1995     Years since quittin.6    Smokeless tobacco: Never Used   Substance and Sexual Activity    Alcohol use: No     Alcohol/week: 0.0 standard drinks    Drug use: No    Sexual activity: Not Currently   Lifestyle    Physical activity     Days per week: Not on file     Minutes per session: Not on file    Stress: Not on file   Relationships    Social connections     Talks on phone: Not on file     Gets together: Not on file     Attends Catholic service: Not on file     Active member of club or organization: Not on file     Attends meetings of clubs or organizations: Not on file     Relationship status: Not on file    Intimate partner violence     Fear of current or ex partner: Not on file     Emotionally abused: Not on file     Physically abused: Not on file     Forced sexual activity: Not on file   Other Topics Concern    Not on file   Social History Narrative    Not on file     Current Outpatient Medications on File Prior to Visit   Medication Sig Dispense Refill    albuterol sulfate HFA (PROAIR HFA) 108 (90 Base) MCG/ACT inhaler Use every 4 hours while awake for 7-10 days then PRN wheezing  Dispense with SPACER and Instruct on use. May sub Ventolin or Proventil as needed per [employer]. 1 Inhaler 1    cilostazol (PLETAL) 50 MG tablet Take 1 tablet by mouth 2 times daily 180 tablet 1    clopidogrel (PLAVIX) 75 MG tablet TAKE 1 TABLET BY MOUTH ONCE DAILY 90 tablet 1    donepezil (ARICEPT) 5 MG tablet Take 1 tablet by mouth nightly 90 tablet 1    metFORMIN (GLUCOPHAGE) 1000 MG tablet Take 1 tablet by mouth twice daily 180 tablet 1    blood glucose test strips (ONE TOUCH TEST STRIPS) strip Check blood sugar  twice daily As needed.  300 each 3    Lancets MISC Test bid 300 each 3    meclizine (ANTIVERT) 25 MG tablet Take 1 tablet stool and constipation. Neurological: Positive for dizziness and light-headedness. Negative for seizures. Physical Exam  Vitals:    07/21/20 1010   BP: 134/78   Pulse: 78   Temp: 97.2 °F (36.2 °C)   SpO2: 98%   Body mass index is 23.62 kg/m². Physical Exam  Constitutional:  Appears well-developed and well-nourished. No distress. HENT:   Head: Normocephalic and atraumatic. Right Ear: External ear normal.   Left Ear: External ear normal.   Nose: Nose normal.   Eyes: Conjunctivae and EOM are normal.  Right eye exhibits no discharge. Left eye exhibits no discharge. No scleral icterus. Neck: Normal range of motion. Cardiovascular: Normal rate, regular rhythm and normal heart sounds. Pulmonary/Chest: Effort normal and breath sounds normal. No respiratory distress. no wheezes. no rales. no tenderness. Musculoskeletal: Normal range of motion. Pain with palpation of right lower rib cage area  Neurological: alert. Skin: not diaphoretic. Psychiatric: normal mood and affect. behavior is normal. Judgment and thought content normal.   Nursing note and vitals reviewed. Assessment/Plan:  Javed Vazquez was seen today for shortness of breath, dizziness and diarrhea.     Diagnoses and all orders for this visit:    Vertigo  Improved  Using antivert only daily  Will wean off slowly  Has appt with neuro    Diarrhea, unspecified type  Likely 2/2 aricept  D/c aricept   Has GI appt  If bowels resolve, can d/c appt    Medication side effect  As above  Stop ariecpt     SOB (shortness of breath)  Resolved       Carolee Bullock MD  Has f/u appt scheduled

## 2020-07-22 ENCOUNTER — TELEPHONE (OUTPATIENT)
Dept: INTERNAL MEDICINE | Age: 75
End: 2020-07-22

## 2020-07-22 NOTE — TELEPHONE ENCOUNTER
Pt's daughter called stating pt would like to start taking anitvert again, pt had a very bad dizzy spell to day.  Please advise

## 2020-07-22 NOTE — TELEPHONE ENCOUNTER
I already replied to her [employer] message    Yes, he can continue to use the medication as long as he continues to get the vertigo.      Thank you!  Felix Rizo MD

## 2020-08-11 ENCOUNTER — CARE COORDINATION (OUTPATIENT)
Dept: CARE COORDINATION | Age: 75
End: 2020-08-11

## 2020-08-11 NOTE — CARE COORDINATION
Called to follow up on patient. Spoke with patients daughter Tamanna Clark who reports that she received information that was mailed on help in home. Tamanna Clark reports that at this time patient is doing well. Reports no new needs at this time. Encouraged Tamanna Clark to call if she needed any other resources. Will no longer follow patient.

## 2020-10-07 ENCOUNTER — VIRTUAL VISIT (OUTPATIENT)
Dept: INTERNAL MEDICINE | Age: 75
End: 2020-10-07
Payer: MEDICARE

## 2020-10-07 PROCEDURE — 99214 OFFICE O/P EST MOD 30 MIN: CPT | Performed by: FAMILY MEDICINE

## 2020-10-07 RX ORDER — MEMANTINE HYDROCHLORIDE 5 MG/1
5 TABLET ORAL DAILY
Qty: 30 TABLET | Refills: 0 | Status: SHIPPED | OUTPATIENT
Start: 2020-10-21 | End: 2020-10-24 | Stop reason: SDUPTHER

## 2020-10-07 RX ORDER — SERTRALINE HYDROCHLORIDE 25 MG/1
25 TABLET, FILM COATED ORAL DAILY
Qty: 30 TABLET | Refills: 3 | Status: SHIPPED | OUTPATIENT
Start: 2020-10-07 | End: 2020-10-24 | Stop reason: SDUPTHER

## 2020-10-07 NOTE — PROGRESS NOTES
Patient: Shakira Manriquez    YOB: 1945    Date: 10/7/20       Patient Active Problem List    Diagnosis Date Noted    PAD (peripheral artery disease) ([facility] 04/21/2020     Priority: High    Alzheimer's dementia without behavioral disturbance ([facility] [address] 04/21/2020     Priority: High    Abnormal ankle brachial index ([place]ST[place])      Priority: High    HTN (hypertension) 03/07/2014     Priority: High    DM (diabetes mellitus) ([facility] [address] 09/26/2013     Priority: High    Aneurysm, common iliac artery ([facility] [address] 04/21/2020     Priority: Medium    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[facility] [address] 04/21/2020     Priority: Medium    Abdominal aortic aneurysm (AAA) without rupture ([facility] [address] 11/28/2017     Priority: Medium     Overview Note:     2.5 cm US 6/2018      PMR (polymyalgia rheumatica) ([facility] [address] 05/19/2014     Priority: Medium    Hyperlipidemia 03/07/2014     Priority: Medium    Umbilical hernia without obstruction or gangrene 09/18/2013     Priority: Low     Past Medical History:   Diagnosis Date    AAA (abdominal aortic aneurysm) (HCC)     Abdominal aortic aneurysm (AAA) without rupture (HCC)     Callus of foot 9/26/2013    Diastasis recti 9/18/2013    DM (diabetes mellitus) ([facility] [address] [facility]     Gout     Gout     Hyperlipidemia     Hypertension     Neuropathy     secondary to DM    Neuropathy     PMR (polymyalgia rheumatica) ([facility] [address] 5/19/2014    Polymyalgia rheumatica ([facility] [address]      Past Surgical History:   Procedure Laterality Date    BACK SURGERY      CHOLECYSTECTOMY      HAND SURGERY Left 11/18/2015    dupuytren''s release with left thumb    INGUINAL HERNIA REPAIR      right side    VASCULAR SURGERY Left oct 18th 2019    lleft leg triple bypass      Social History     Socioeconomic History    Marital status:       Spouse name: Not on file    Number of children: Not on file    Years of education: Not on file    Highest education level: Not on file   Occupational History    Not on file   Social Needs    Financial resource strain: Not on file    Food insecurity     Worry: Not on file     Inability: Not on file    Transportation needs     Medical: Not on file     Non-medical: Not on file   Tobacco Use    Smoking status: Former Smoker     Packs/day: 1.00     Years: 30.00     Pack years: 30.00     Types: Cigarettes     Last attempt to quit: 1995     Years since quittin.8    Smokeless tobacco: Never Used   Substance and Sexual Activity    Alcohol use: No     Alcohol/week: 0.0 standard drinks    Drug use: No    Sexual activity: Not Currently   Lifestyle    Physical activity     Days per week: Not on file     Minutes per session: Not on file    Stress: Not on file   Relationships    Social connections     Talks on phone: Not on file     Gets together: Not on file     Attends Episcopal service: Not on file     Active member of club or organization: Not on file     Attends meetings of clubs or organizations: Not on file     Relationship status: Not on file    Intimate partner violence     Fear of current or ex partner: Not on file     Emotionally abused: Not on file     Physically abused: Not on file     Forced sexual activity: Not on file   Other Topics Concern    Not on file   Social History Narrative    Not on file     No family history on file. Current Outpatient Medications on File Prior to Visit   Medication Sig Dispense Refill    meclizine (ANTIVERT) 25 MG tablet TAKE 1 TABLET BY MOUTH THREE TIMES DAILY AS NEEDED FOR DIZZINESS AND FOR NAUSEA MAY CAUSE DROWSINESS 90 tablet 0    albuterol sulfate HFA (PROAIR HFA) 108 (90 Base) MCG/ACT inhaler Use every 4 hours while awake for 7-10 days then PRN wheezing  Dispense with SPACER and Instruct on use. May sub Ventolin or Proventil as needed per [employer].  1 Inhaler 1    cilostazol (PLETAL) 50 MG tablet Take 1 tablet by mouth 2 times daily 180 tablet 1    clopidogrel (PLAVIX) 75 MG tablet TAKE 1 TABLET BY MOUTH ONCE DAILY 90 tablet 1    metFORMIN (GLUCOPHAGE) 1000 MG tablet Take 1 tablet by mouth twice daily 180 tablet 1    blood glucose test strips (ONE TOUCH TEST STRIPS) strip Check blood sugar  twice daily As needed. 300 each 3    Lancets MISC Test bid 300 each 3     No current facility-administered medications on file prior to visit. No Known Allergies    Chief Complaint   Patient presents with    Diabetes    Hypertension    Hyperlipidemia    Anxiety     worse in past two weeks. TELEHEALTH EVALUATION -- Audio/Visual (During VINDU-02 public health emergency)    Due to COVID 19 outbreak, patient's office visit was converted to a virtual visit. Patient was contacted and agreed to proceed with a virtual visit via [facility] me  The risks and benefits of converting to a virtual visit were discussed in light of the current infectious disease epidemic. Patient also understood that insurance coverage and co-pays are up to their individual insurance plans. Pursuant to the emergency declaration under the [facility] waiver authority and the [employer] Act, this Virtual  Visit was conducted, with patient's consent, to reduce the patient's risk of exposure to COVID-19 and provide continuity of care for an established patient. Services were provided through a video  discussion virtually to substitute for in-person clinic visit. HPI:  Treatment Adherence:   Medication compliance:  compliant all of the time  Diet compliance:  noncompliant: eats what wife fixes  Current exercise: no regular exercise    Diabetes Mellitus Type 2: Current symptoms/problems include none and neuropathy. Home blood sugar records:  Fasting 264 this morning, normally 130-140  Any episodes of hypoglycemia? No  Tobacco history: He  reports that he quit smoking about 24 years ago. His smoking use included cigarettes. He has a 30.00 pack-year smoking history.  He has never used smokeless tobacco.   Daily Aspirin? No  Have you had yourannual diabetic retinal (eye) exam? No    Hypertension:  Home blood pressure monitoring: No.  Heis adherent to a low sodium diet. Patient denies chest pain and shortness of breath. Antihypertensive medication side effects: no medication side effects noted and nomedication side effects noted. Use of agents associated with hypertension: none. Hyperlipidemia:  No new myalgias or GI upset on nothing. Patient is  following a low fat, low cholesterol diet. He is  exercising regularly.      Lab Results   Component Value Date    CHOL 139 10/09/2019    TRIG 175 (H) 10/09/2019    HDL 35 (L) 10/09/2019    LDLCALC 69 10/09/2019     Lab Results   Component Value Date    ALT 9 07/14/2020    AST 15 07/14/2020        Lab Results   Component Value Date    LABA1C 7.0 02/06/2020    LABA1C 7.7 09/11/2019    LABA1C 7.3 03/11/2019     Lab Results   Component Value Date    LABMICR <1.20 06/05/2018    CREATININE 1.04 07/14/2020     Lab Results   Component Value Date    ALT 9 07/14/2020    AST 15 07/14/2020     Lab Results   Component Value Date    CHOL 139 10/09/2019    TRIG 175 (H) 10/09/2019    HDL 35 (L) 10/09/2019    LDLCALC 69 10/09/2019        Diabetes and Hypertension Visit Information    BP Readings from Last 3 Encounters:   07/21/20 134/78   07/14/20 130/60   06/19/20 (!) 153/77          Patient Care Team:  Tarah Donnelly MD as PCP - General (Family Medicine)  Tarah Donnelly MD as PCP - [facility] Provider  Medardo Richard MD as Consulting Physician (Rheumatology)  Franck Kline DO as Cardiologist (Cardiology)           Health Maintenance   Topic Date Due    DTaP/Tdap/Td vaccine (2 - Td) 04/18/1973    Shingles Vaccine (1 of 2) 04/18/1994    Flu vaccine (1) 09/01/2020    Annual Wellness Visit (AWV)  05/21/2021    Pneumococcal 65+ years Vaccine  Completed    Hepatitis A vaccine  Aged Out    Hib vaccine  Aged Out    Meningococcal (ACWY) vaccine  Aged Out       PMR  He is no longer on chronic steroid therapy for PMR  He is doing well     AAA/PAD, LES aneurysm, right popliteal artery aneurysm   Seeing   Doing better on pletal   Cardiology did order stress test - pt has not gotten test done - pt does not want testing     Needs help with transportation for office visits   He would like his daughter contacted  Loli Farr [phone] - caregiver  Cole Cardenas [phone]    Forgetfulness   Has noted some loss of memory  Will forget small things, feeding animals in barn, getting groceries for example  He lives with his daughter now since wife passed away  Statin was held due to memory loss, pt feels like it has made memory worse and he does not want to get back on it     Neurology November 5th   Spoke with pt daughter Loli Farr - she is his POA    Anxiety:  Pt lives with his daughter  She has a farm And leases out horses   Pt daughter says pt is not feeling well especially since things at her place are very busy  Little Things get on his Arty Nanas"  Worse past week, ongoing for a weeks/months  Stuff like garbage placement by garbage workers upset him    Memory is worse  Memory is also getting worse  Eyesight is getting bad  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 He is still driving, once weekly. CT head in 2019 WNL  Did not tolerate Aricept due to diarrhea    Per Loli Farr, he sometimes can't remember her name and grand kids  She has to remind him to wash at times     ROS  Constitutional: Negative for fatigue, fever and sweats. HEENT: Negative for eye discharge and vision loss. Negative for ear drainage, hearing loss and nasal drainage. Respiratory: Negative for cough, dyspnea and wheezing.   Cardiovascular:  Negative for chest pain, claudication and irregular heartbeat/palpitations. Physical Exam  BP Readings from Last 3 Encounters:   07/21/20 134/78   07/14/20 130/60   06/19/20 (!) 153/77     Nursing note reviewed. Constitutional:       General: no acute distress. Appearance: Normal appearance. normal weight. not ill-appearing, toxic-appearing or diaphoretic. HENT:      Head: Normocephalic and atraumatic. Right Ear: External ear normal.      Left Ear: External ear normal.      Nose: Nose normal.   Eyes:      General: No scleral icterus. Right eye: No discharge. Left eye: No discharge. Extraocular Movements: Extraocular movements intact. Conjunctiva/sclera: Conjunctivae normal.   Neck:      Musculoskeletal: Normal range of motion. Pulmonary:      Effort: Pulmonary effort is normal.   Musculoskeletal: Normal range of motion. Neurological:      General: No focal deficit present. Mental Status: alert. Mental status is at baseline. Psychiatric:         Attention and Perception: Attention and perception normal.         Mood and Affect: Mood and affect normal.         Speech: Speech normal.         Behavior: Behavior normal. Behavior is cooperative. Thought Content: Thought content normal.         Cognition and Memory: Memory normal.     Due to this being a TeleHealth encounter, evaluation of the following organ systems is limited: Vitals/Constitutional/EENT/Resp/CV/GI//MS/Neuro/Skin/Heme-Lymph-Imm. Assessment/Plan:  Woody Goldstein was seen today for diabetes, hypertension, hyperlipidemia and anxiety. Diagnoses and all orders for this visit:    Anxiety  Adjustment disorder with anxious mood  -     sertraline (ZOLOFT) 25 MG tablet; Take 1 tablet by mouth daily  hand out provided, had a lengthy discussion with patient about treatment, it's side effects, the diagnosis & etiology of symptoms, along with management and expectations of outcome with the recommended treatment. Patient verbalized understanding.     Early onset Alzheimer's dementia without behavioral disturbance (HCC)  -     memantine (NAMENDA) 5 MG tablet; Take 1 tablet by mouth daily DO NOT FILL BEFORE 10/21/2020  hand out provided, had a lengthy discussion with patient about treatment, it's side effects, the diagnosis & etiology of symptoms, along with management and expectations of outcome with the recommended treatment. Patient verbalized understanding. Had a very lengthy discussion with pt and his daughter  I do not think pt is safe to drive, plan is get MMSE/MOCHA at f/u visit and discuss no driving  He is declining and needing more assistance, memory is also worsening  Offered referral to care coordinator and [address] says they are doing ok for now     Total visit time >25 mins, more than 50% time spent on counseling, treatment, side effects, and follow up      No orders of the defined types were placed in this encounter. I personally reviewed all recent labs and imaging pertaining to conditions mentioned above in assessment/plan in EPIC and care everywhere, discussed results with patient     Diabetes Counseling   Patient was counseled regarding disease risks and adopting healthy behaviors. Patient was provided education materials to assist with self management. Patient was provided log (or received log during previous visit) to record blood pressure, food intake and/or blood sugar. Patient was instructed to keep log up-to-date and to always bring log to all office visits. Reviewed the following:  - Morbidity from diabetes involves both macrovascular (atherosclerosis) and microvascular disease (retinopathy, nephropathy, and neuropathy). - Monitoring recommendations for patients with diabetes were discussed  1. Smoking cessation counseling (Every visit)   2. Blood pressure (Every visit) Goal <140/80   3. Dilated eye examination (Annually, more than annually if significant retinopathy)   4.  Foot examination (Annually, every visit if peripheral vascular disease or neuropathy)   5. Laboratory studies:    - Fasting serum lipid profile (Annually)  cholesterol (goal LDL less than 100  mg/dL    - A1C (Every three to six months) Goal <7 percent   - Urinary albumin-to-creatinine ratio (Annually, protein excretion and serum  creatinine should also be monitored if persistent albuminuria is present)    - Serum creatinine, initially as indicated  6. ASA (75 to 162 mg/day) in patients with or at high risk for cardiovascular disease  7. Vaccinations:   - Pneumococcus, One time Patients over age 72 need a second dose if vaccine  was received ? 5 years previously and age was <65 at time of vaccination    - Influenza, Annually    - Hepatitis B, They should have had the three dose series   8. Education, self management review Annually      Return in about 22 days (around 10/29/2020) for in office - f/u alzheimer's disease - can not drive anymore . Patient is aware this encounter is billable to insurance. This encounter occurred with patient at home while provider was at the office.

## 2020-10-24 ENCOUNTER — PATIENT MESSAGE (OUTPATIENT)
Dept: INTERNAL MEDICINE | Age: 75
End: 2020-10-24

## 2020-10-24 NOTE — TELEPHONE ENCOUNTER
Requesting medication refill.  Please approve or deny this request.    Rx requested:  Requested Prescriptions     Pending Prescriptions Disp Refills    metFORMIN (GLUCOPHAGE) 1000 MG tablet [Pharmacy Med Name: metFORMIN HCl 1000 MG Oral Tablet] 180 tablet 0     Sig: Take 1 tablet by mouth twice daily       Last Office Visit:   10/7/2020        REASON LAST SEEN AND BY WHO:    Dr Aurelio Kwong,        Diabetes , Hypertension , Hyperlipidemia , Anxiety     P PLAN FROM LAST PCP VISIT: COPY AND PASTE FROM LAST PCP NOTE    Return in about 22 days (around 10/29/2020) for in office - f/u alzheimer's disease - can not drive anymore .            PATIENT CONTACTED FOR A FOLLOW UP APPT: YES OR NO    no    Next Visit Date:  Future Appointments   Date Time Provider Lotus Zazueta   11/5/2020  8:30 AM Moiz Snow, APRN - [facility]   11/10/2020 11:30 AM Jordan Contreras MD [address]   5/25/2021 11:00 AM MD [facility]

## 2020-10-25 NOTE — TELEPHONE ENCOUNTER
From: Shakira Manriquez  To: Steven Fitzgerald MD  Sent: 10/24/2020 5:10 PM EDT  Subject: Non-Urgent Medical Question    Yeny this is Charisma Bell daughter just wanted to inform you in case the paperwork is not sent from [facility] in [facility] that he is having cadorac surgery November 11 and 25th.  Right eye will be first

## 2020-10-26 RX ORDER — MEMANTINE HYDROCHLORIDE 5 MG/1
5 TABLET ORAL DAILY
Qty: 90 TABLET | Refills: 1 | Status: SHIPPED | OUTPATIENT
Start: 2020-10-26 | End: 2020-11-10

## 2020-10-26 RX ORDER — SERTRALINE HYDROCHLORIDE 25 MG/1
25 TABLET, FILM COATED ORAL DAILY
Qty: 90 TABLET | Refills: 1 | Status: SHIPPED | OUTPATIENT
Start: 2020-10-26 | End: 2021-05-25

## 2020-11-05 ENCOUNTER — OFFICE VISIT (OUTPATIENT)
Dept: NEUROLOGY | Age: 75
End: 2020-11-05
Payer: MEDICARE

## 2020-11-05 VITALS
HEART RATE: 68 BPM | BODY MASS INDEX: 24.29 KG/M2 | WEIGHT: 169.3 LBS | DIASTOLIC BLOOD PRESSURE: 65 MMHG | SYSTOLIC BLOOD PRESSURE: 149 MMHG

## 2020-11-05 DIAGNOSIS — R41.89 COGNITIVE IMPAIRMENT: ICD-10-CM

## 2020-11-05 DIAGNOSIS — E11.9 TYPE 2 DIABETES MELLITUS WITHOUT COMPLICATION, WITHOUT LONG-TERM CURRENT USE OF INSULIN (HCC): ICD-10-CM

## 2020-11-05 DIAGNOSIS — R42 VERTIGO: ICD-10-CM

## 2020-11-05 DIAGNOSIS — E78.5 HYPERLIPIDEMIA, UNSPECIFIED HYPERLIPIDEMIA TYPE: ICD-10-CM

## 2020-11-05 LAB
ALBUMIN SERPL-MCNC: 4.5 G/DL (ref 3.5–4.6)
ALP BLD-CCNC: 59 U/L (ref 35–104)
ALT SERPL-CCNC: 16 U/L (ref 0–41)
ANION GAP SERPL CALCULATED.3IONS-SCNC: 13 MEQ/L (ref 9–15)
AST SERPL-CCNC: 17 U/L (ref 0–40)
BILIRUB SERPL-MCNC: <0.2 MG/DL (ref 0.2–0.7)
BUN BLDV-MCNC: 12 MG/DL (ref 8–23)
CALCIUM SERPL-MCNC: 9.5 MG/DL (ref 8.5–9.9)
CHLORIDE BLD-SCNC: 99 MEQ/L (ref 95–107)
CHOLESTEROL, TOTAL: 215 MG/DL (ref 0–199)
CO2: 27 MEQ/L (ref 20–31)
CREAT SERPL-MCNC: 1.01 MG/DL (ref 0.7–1.2)
FOLATE: 19.1 NG/ML (ref 7.3–26.1)
GFR AFRICAN AMERICAN: >60
GFR NON-AFRICAN AMERICAN: >60
GLOBULIN: 2.9 G/DL (ref 2.3–3.5)
GLUCOSE BLD-MCNC: 96 MG/DL (ref 70–99)
HBA1C MFR BLD: 8.5 % (ref 4.8–5.9)
HCT VFR BLD CALC: 39.1 % (ref 42–52)
HDLC SERPL-MCNC: 35 MG/DL (ref 40–59)
HEMOGLOBIN: 13.1 G/DL (ref 14–18)
LDL CHOLESTEROL CALCULATED: ABNORMAL MG/DL (ref 0–129)
MCH RBC QN AUTO: 29.6 PG (ref 27–31.3)
MCHC RBC AUTO-ENTMCNC: 33.5 % (ref 33–37)
MCV RBC AUTO: 88.3 FL (ref 80–100)
PDW BLD-RTO: 14.5 % (ref 11.5–14.5)
PLATELET # BLD: 258 K/UL (ref 130–400)
POTASSIUM SERPL-SCNC: 4.1 MEQ/L (ref 3.4–4.9)
RBC # BLD: 4.42 M/UL (ref 4.7–6.1)
SODIUM BLD-SCNC: 139 MEQ/L (ref 135–144)
TOTAL PROTEIN: 7.4 G/DL (ref 6.3–8)
TRIGL SERPL-MCNC: 473 MG/DL (ref 0–150)
TSH REFLEX: 2.36 UIU/ML (ref 0.44–3.86)
VITAMIN B-12: 344 PG/ML (ref 232–1245)
WBC # BLD: 6.8 K/UL (ref 4.8–10.8)

## 2020-11-05 PROCEDURE — 99205 OFFICE O/P NEW HI 60 MIN: CPT | Performed by: NURSE PRACTITIONER

## 2020-11-05 PROCEDURE — 3051F HG A1C>EQUAL 7.0%<8.0%: CPT | Performed by: NURSE PRACTITIONER

## 2020-11-05 ASSESSMENT — ENCOUNTER SYMPTOMS
COLOR CHANGE: 0
VOMITING: 0
SHORTNESS OF BREATH: 0
NAUSEA: 0
BACK PAIN: 0
TROUBLE SWALLOWING: 0
SINUS PAIN: 1
COUGH: 0
WHEEZING: 0
SINUS PRESSURE: 1
CHEST TIGHTNESS: 0

## 2020-11-05 NOTE — PROGRESS NOTES
aneurysm (AAA) without rupture ([facility]     Callus of foot 2013    Diastasis recti 2013    DM (diabetes mellitus) ([facility]     Gout     Gout     Hyperlipidemia     Hypertension     Neuropathy     secondary to DM    Neuropathy     PMR [facility]algia [facility]a) ([facility] 2014    Polymyalgia rheumatica ([facility]      Past Surgical History:   Procedure Laterality Date    BACK SURGERY      CHOLECYSTECTOMY      HAND SURGERY Left 2015    dupuytren''s release with left thumb    INGUINAL HERNIA REPAIR      right side    VASCULAR SURGERY Left oct 18th 2019    lleft leg triple bypass      Social History     Socioeconomic History    Marital status:       Spouse name: Not on file    Number of children: Not on file    Years of education: Not on file    Highest education level: Not on file   Occupational History    Not on file   Social Needs    Financial resource strain: Not on file    Food insecurity     Worry: Not on file     Inability: Not on file    Transportation needs     Medical: Not on file     Non-medical: Not on file   Tobacco Use    Smoking status: Former Smoker     Packs/day: 1.00     Years: 30.00     Pack years: 30.00     Types: Cigarettes     Last attempt to quit: 1995     Years since quittin.9    Smokeless tobacco: Never Used   Substance and Sexual Activity    Alcohol use: No     Alcohol/week: 0.0 standard drinks    Drug use: No    Sexual activity: Not Currently   Lifestyle    Physical activity     Days per week: Not on file     Minutes per session: Not on file    Stress: Not on file   Relationships    Social connections     Talks on phone: Not on file     Gets together: Not on file     Attends Hindu service: Not on file     Active member of club or organization: Not on file     Attends meetings of clubs or organizations: Not on file     Relationship status: Not on file    Intimate partner violence     Fear of current or ex partner: Not on file Negative for visual disturbance. Respiratory: Negative for cough, chest tightness, shortness of breath and wheezing. Cardiovascular: Negative for chest pain, palpitations and leg swelling. Gastrointestinal: Negative for nausea and vomiting. Musculoskeletal: Positive for gait problem. Negative for back pain and neck pain. Skin: Negative for color change and rash. Neurological: Positive for dizziness. Negative for tremors, seizures, syncope, facial asymmetry, speech difficulty, weakness, light-headedness, numbness and headaches. Psychiatric/Behavioral: Negative for agitation, confusion and hallucinations. The patient is not nervous/anxious. Objective:   BP (!) 149/65 (Site: Left Upper Arm, Position: Standing, Cuff Size: Medium Adult)   Pulse 68   Wt 169 lb 4.8 oz (76.8 kg)   BMI 24.29 kg/m²     Physical Exam  Vitals signs reviewed. Constitutional:       General: He is not in acute distress. Appearance: He is not ill-appearing or diaphoretic. HENT:      Head: Normocephalic and atraumatic. Eyes:      General: No visual field deficit. Extraocular Movements: Extraocular movements intact. Pupils: Pupils are equal, round, and reactive to light. Cardiovascular:      Rate and Rhythm: Normal rate and regular rhythm. Pulmonary:      Effort: Pulmonary effort is normal. No respiratory distress. Breath sounds: Normal breath sounds. Skin:     General: Skin is warm and dry. Neurological:      Mental Status: He is alert and oriented to person, place, and time. Cranial Nerves: No dysarthria or facial asymmetry. Motor: No weakness, tremor, atrophy, abnormal muscle tone, seizure activity or pronator drift. Coordination: Romberg sign positive.  Coordination normal. Finger-Nose-Finger Test normal.      Gait: Gait abnormal.         Xr Chest Standard (2 Vw)    Result Date: 7/14/2020  XR CHEST (2 VW) Exam Date/Time:  7/14/2020 3:24 PM Clinical History:  R06.02 SOB (shortness of breath) ICD10 . Comparison:  6/19/2020  RESULT: Lungs and pleura:  Emphysematous changes. No focal consolidation. No pleural effusion. No pneumothorax. Normal pulmonary vascular pattern. Cardiomediastinal silhouette:  Normal. Other:  No acute osseous findings. Remote sixth rib fracture, unchanged. Surgical clips right upper quadrant. No acute radiographic abnormality. Lab Results   Component Value Date    WBC 7.0 07/14/2020    RBC 4.37 07/14/2020    HGB 12.9 07/14/2020    HCT 38.8 07/14/2020    MCV 88.8 07/14/2020    MCH 29.5 07/14/2020    MCHC 33.3 07/14/2020    RDW 14.6 07/14/2020     07/14/2020    MPV 10.1 08/09/2017     Lab Results   Component Value Date     07/14/2020    K 4.2 07/14/2020     07/14/2020    CO2 23 07/14/2020    BUN 20 07/14/2020    CREATININE 1.04 07/14/2020    GFRAA >60.0 07/14/2020    LABGLOM >60.0 07/14/2020    GLUCOSE 197 07/14/2020    PROT 7.4 07/14/2020    LABALBU 4.7 07/14/2020    CALCIUM 10.2 07/14/2020    BILITOT 0.3 07/14/2020    ALKPHOS 64 07/14/2020    AST 15 07/14/2020    ALT 9 07/14/2020     Lab Results   Component Value Date    PROTIME 12.5 06/19/2020    INR 0.9 06/19/2020     Lab Results   Component Value Date    FERRITIN 26 08/09/2017    IRON 34 08/09/2017    TIBC 338 08/09/2017     Lab Results   Component Value Date    TRIG 175 10/09/2019    HDL 35 10/09/2019    LDLCALC 69 10/09/2019    LABVLDL - 12/07/2016     No results found for: LABAMPH, BARBSCNU, LABBENZ, CANNAB, COCAINESCRN, LABMETH, OPIATESCREENURINE, PHENCYCLIDINESCREENURINE, PPXUR, ETOH  No results found for: LITHIUM, DILFRTOT, VALPROATE    Assessment and Plan:      1. Vertigo  -Patient is a vasculopath and has multiple risk factors for CVD. Romberg was positive. Will need to obtain MRI of the brain to rule out any intracranial pathology or cerebellar infarct/lesion. Will also need to obtain carotid duplex to assess for any stenosis given his underlying risk factors.   Will obtain laboratory testing to rule out underlying metabolic or infectious issues and to assess control of risk factors. Orthostatic blood pressures were assessed and were negative. Patient can continue on meclizine for now. If MRI negative will consider sending patient for vestibular rehab as well as physical therapy for gait ataxia with recurrent falls. He also is noted to have sinus pain pressure and congestion with bulging tympanic membranes and seasonal allergies. Will initiate him on Flonase nasal spray over-the-counter as directed. We recommend that patient follow-up with cardiology given his reports of dizziness worsening with activity and walking especially given his risk factors. He did have a stress test done on 2/18/2020 and results were reviewed. Will await work-up with further recommendations to follow. - MRI BRAIN WO CONTRAST; Future  - CBC; Future  - Comprehensive Metabolic Panel; Future  - TSH with Reflex; Future    2. Ataxic gait  -Lower extremity neuropathy could be contributing. Consider MRI of cervical spine if other work-up negative given his positive Romberg. 3. Essential hypertension  - MRI BRAIN WO CONTRAST; Future    4. Hyperlipidemia, unspecified hyperlipidemia type  - MRI BRAIN WO CONTRAST; Future  - Lipid Panel; Future    5. Type 2 diabetes mellitus without complication, without long-term current use of insulin ([facility]  - MRI BRAIN WO CONTRAST; Future  - Hemoglobin A1C; Future  - TSH with Reflex; Future    6. Cognitive impairment  - Vitamin B12 & Folate; Future  -We will follow-up and do further testing at next appointment    7. Bilateral carotid artery stenosis  - US CAROTID ARTERY BILATERAL; Future    8. Encounter to establish care      Return in about 3 months (around 2/5/2021), or if symptoms worsen or fail to improve.     Anand Scale, APRN - CNP

## 2020-11-06 ENCOUNTER — TELEPHONE (OUTPATIENT)
Dept: NEUROLOGY | Age: 75
End: 2020-11-06

## 2020-11-06 NOTE — TELEPHONE ENCOUNTER
It is not supposed to say IAC. I have had this conversation on multiple occasions with both scheduling and their manager. Please call them and tell them I want the test done as ordered.   Thank you

## 2020-11-10 ENCOUNTER — OFFICE VISIT (OUTPATIENT)
Dept: INTERNAL MEDICINE | Age: 75
End: 2020-11-10
Payer: MEDICARE

## 2020-11-10 VITALS
BODY MASS INDEX: 24.25 KG/M2 | SYSTOLIC BLOOD PRESSURE: 136 MMHG | WEIGHT: 169 LBS | TEMPERATURE: 98.6 F | DIASTOLIC BLOOD PRESSURE: 70 MMHG | OXYGEN SATURATION: 97 % | HEART RATE: 77 BPM

## 2020-11-10 PROCEDURE — 99214 OFFICE O/P EST MOD 30 MIN: CPT | Performed by: FAMILY MEDICINE

## 2020-11-10 RX ORDER — MECLIZINE HYDROCHLORIDE 25 MG/1
TABLET ORAL
Qty: 90 TABLET | Refills: 0 | Status: SHIPPED | OUTPATIENT
Start: 2020-11-10 | End: 2021-09-07

## 2020-11-10 NOTE — PROGRESS NOTES
Patient: Martinez Mercado    YOB: 1945    Date: 11/10/20       Patient Active Problem List    Diagnosis Date Noted    Abnormal ankle brachial index (ANASTACIA)      Priority: High    PAD (peripheral artery disease) ([facility] 04/21/2020    Alzheimer's dementia without behavioral disturbance ([facility] 04/21/2020    Aneurysm, common iliac artery ([facility] 04/21/2020    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[address] 04/21/2020    Abdominal aortic aneurysm (AAA) without rupture ([address] 11/28/2017     Overview Note:     2.5 cm US 6/2018      PMR (polymyalgia rheumatica) ([address] 05/19/2014    HTN (hypertension) 03/07/2014    Hyperlipidemia 03/07/2014    DM (diabetes mellitus) ([address] 30/75/9919    Umbilical hernia without obstruction or gangrene 09/18/2013     Past Medical History:   Diagnosis Date    AAA (abdominal aortic aneurysm) (HCC)     Abdominal aortic aneurysm (AAA) without rupture ([facility]     Callus of foot 9/26/2013    Diastasis recti 9/18/2013    DM (diabetes mellitus) ([facility]     Gout     Gout     Hyperlipidemia     Hypertension     Neuropathy     secondary to DM    Neuropathy     PMR (polymyalgia rheumatica) ([address] 5/19/2014    Polymyalgia rheumatica ([address]      Past Surgical History:   Procedure Laterality Date    BACK SURGERY      CHOLECYSTECTOMY      HAND SURGERY Left 11/18/2015    dupuytren''s release with left thumb    INGUINAL HERNIA REPAIR      right side    VASCULAR SURGERY Left oct 18th 2019    lleft leg triple bypass      Social History     Socioeconomic History    Marital status:       Spouse name: Not on file    Number of children: Not on file    Years of education: Not on file    Highest education level: Not on file   Occupational History    Not on file   Social Needs    Financial resource strain: Not on file    Food insecurity     Worry: Not on file     Inability: Not on file    Transportation needs     Medical: Not on file     Non-medical: Not on file   Tobacco Use    Smoking status: Former Smoker     Packs/day: 1.00     Years: 30.00     Pack years: 30.00     Types: Cigarettes     Last attempt to quit: 1995     Years since quittin.9    Smokeless tobacco: Never Used   Substance and Sexual Activity    Alcohol use: No     Alcohol/week: 0.0 standard drinks    Drug use: No    Sexual activity: Not Currently   Lifestyle    Physical activity     Days per week: Not on file     Minutes per session: Not on file    Stress: Not on file   Relationships    Social connections     Talks on phone: Not on file     Gets together: Not on file     Attends Orthodoxy service: Not on file     Active member of club or organization: Not on file     Attends meetings of clubs or organizations: Not on file     Relationship status: Not on file    Intimate partner violence     Fear of current or ex partner: Not on file     Emotionally abused: Not on file     Physically abused: Not on file     Forced sexual activity: Not on file   Other Topics Concern    Not on file   Social History Narrative    Not on file     No family history on file.   Current Outpatient Medications on File Prior to Visit   Medication Sig Dispense Refill    metFORMIN (GLUCOPHAGE) 1000 MG tablet Take 1 tablet by mouth 2 times daily (with meals) 180 tablet 1    metFORMIN (GLUCOPHAGE) 1000 MG tablet Take 1 tablet by mouth twice daily (Patient not taking: Reported on 2020) 180 tablet 1    metFORMIN (GLUCOPHAGE) 1000 MG tablet Take 1 tablet by mouth 2 times daily (with meals) (Patient not taking: Reported on 2020) 180 tablet 1    memantine (NAMENDA) 5 MG tablet Take 1 tablet by mouth daily DO NOT FILL BEFORE 10/21/2020 90 tablet 1    sertraline (ZOLOFT) 25 MG tablet Take 1 tablet by mouth daily 90 tablet 1    meclizine (ANTIVERT) 25 MG tablet TAKE 1 TABLET BY MOUTH THREE TIMES DAILY AS NEEDED FOR DIZZINESS AND FOR NAUSEA MAY CAUSE DROWSINESS 90 tablet 0    albuterol sulfate HFA (PROAIR HFA) 108 (90 Base) MCG/ACT inhaler Use every 4 has made memory worse and he does not want to get back on it     Seen by neurology -note reviewed  He is set up for MRI and carotid ultrasound    MMSE 16/30  MOCHA 18/30    Did not tolerate aricpet due to diarrhea  Trial of namenda last visit  Currently also giving him loose stools    ROS  Constitutional: Negative for fatigue, fever and sweats. HEENT: Negative for eye discharge and vision loss. Negative for ear drainage, hearing loss and nasal drainage. Respiratory: Negative for cough, dyspnea and wheezing. Cardiovascular:  Negative for chest pain, claudication and irregular heartbeat/palpitations. Physical Exam  BP Readings from Last 3 Encounters:   11/05/20 (!) 149/65   07/21/20 134/78   07/14/20 130/60     Nursing note reviewed. Constitutional:       General: no acute distress. Appearance: Normal appearance. normal weight. not ill-appearing, toxic-appearing or diaphoretic. HENT:      Head: Normocephalic and atraumatic. Right Ear: External ear normal.      Left Ear: External ear normal.      Nose: Nose normal.   Eyes:      General: No scleral icterus. Right eye: No discharge. Left eye: No discharge. Extraocular Movements: Extraocular movements intact. Conjunctiva/sclera: Conjunctivae normal.   Neck:      Musculoskeletal: Normal range of motion. Pulmonary:      Effort: Pulmonary effort is normal.   Musculoskeletal: Normal range of motion. Neurological:      General: No focal deficit present. Mental Status: alert. Mental status is at baseline. Psychiatric:         Attention and Perception: Attention and perception normal.         Mood and Affect: Mood and affect normal.         Speech: Speech normal.         Behavior: Behavior normal. Behavior is cooperative. Thought Content:  Thought content normal.         Cognition and Memory: Memory normal.     Due to this being a TeleHealth encounter, evaluation of the following organ systems is limited: Vitals/Constitutional/EENT/Resp/CV/GI//MS/Neuro/Skin/Heme-Lymph-Imm. Assessment/Plan:  Abhay Carmona was seen today for memory loss. Diagnoses and all orders for this visit:    Alzheimer's dementia without behavioral disturbance, unspecified timing of dementia onset ([facility]  Cerebellar gait  Seen by neuro  B12/folic acid levels reviewed  Await MRI/US results  Stop namenda for now due to diarrhea  Can cont zoloft  Discussed driving, pt not ready to give this up just yet  Advised try to have [employer] drive him he needs to go somewhere       I personally reviewed all recent labs and imaging pertaining to conditions mentioned above in assessment/plan in [facility] and care everywhere, discussed results with patient     Diabetes Counseling   Patient was counseled regarding disease risks and adopting healthy behaviors. Patient was provided education materials to assist with self management. Patient was provided log (or received log during previous visit) to record blood pressure, food intake and/or blood sugar. Patient was instructed to keep log up-to-date and to always bring log to all office visits. Reviewed the following:  - Morbidity from diabetes involves both macrovascular (atherosclerosis) and microvascular disease (retinopathy, nephropathy, and neuropathy). - Monitoring recommendations for patients with diabetes were discussed  1. Smoking cessation counseling (Every visit)   2. Blood pressure (Every visit) Goal <140/80   3. Dilated eye examination (Annually, more than annually if significant retinopathy)   4. Foot examination (Annually, every visit if peripheral vascular disease or neuropathy)   5.  Laboratory studies:    - Fasting serum lipid profile (Annually)  cholesterol (goal LDL less than 100  mg/dL    - A1C (Every three to six months) Goal <7 percent   - Urinary albumin-to-creatinine ratio (Annually, protein excretion and serum  creatinine should also be monitored if persistent albuminuria is present)    - Serum creatinine, initially as indicated  6. ASA (75 to 162 mg/day) in patients with or at high risk for cardiovascular disease  7. Vaccinations:   - Pneumococcus, One time Patients over age 72 need a second dose if vaccine  was received ? 5 years previously and age was <65 at time of vaccination    - Influenza, Annually    - Hepatitis B, They should have had the three dose series   8. Education, self management review Annually      Return in about 6 months (around 5/10/2021) for Diabetes, Hypertension. Patient is aware this encounter is billable to insurance. This encounter occurred with patient at home while provider was at the office.

## 2020-11-13 ENCOUNTER — TELEPHONE (OUTPATIENT)
Dept: NEUROLOGY | Age: 75
End: 2020-11-13

## 2020-11-13 NOTE — TELEPHONE ENCOUNTER
Spoke to [facility] the diagnoses of dizziness needs to be removed so it doesn't say it has to be IAC, fe said she wasn't removing anything and was going to talk to  about it

## 2020-11-13 NOTE — TELEPHONE ENCOUNTER
Called and reviewed results of lipid panel, hemoglobin A1c, CMP, CBC, TSH and B12 and folate with patient's daughter. Hemoglobin A1c noted to be elevated at 8.5. Lipid panel with markedly elevated triglycerides low HDL and high cholesterol. Advised daughter that patient needs to follow-up with primary care regarding these findings for further medication management.

## 2020-11-20 ENCOUNTER — HOSPITAL ENCOUNTER (OUTPATIENT)
Dept: MRI IMAGING | Age: 75
Discharge: HOME | End: 2020-11-22
Payer: MEDICARE

## 2020-11-20 ENCOUNTER — HOSPITAL ENCOUNTER (OUTPATIENT)
Dept: ULTRASOUND IMAGING | Age: 75
Discharge: HOME | End: 2020-11-22
Payer: MEDICARE

## 2020-11-20 PROCEDURE — 93880 EXTRACRANIAL BILAT STUDY: CPT

## 2020-11-20 PROCEDURE — 70551 MRI BRAIN STEM W/O DYE: CPT

## 2020-11-23 ENCOUNTER — TELEPHONE (OUTPATIENT)
Dept: NEUROLOGY | Age: 75
End: 2020-11-23

## 2020-11-23 NOTE — TELEPHONE ENCOUNTER
Called and spoke with patient's daughter who was alarmed given the fact that MRI brain results were released to the patient and family through my chart and they do not understand the results. Called her to discuss this. Discussed findings of remote infarct and relevance. Advised them on patient's multiple risk factors for recurrent CVA including his uncontrolled blood sugars as well as his uncontrolled cholesterol and history of hypertension. Patient is intolerant to statins and other cholesterol medications. Daughter has already reached out to patient's primary care provider to make an appointment to determine what cholesterol medication patient can tolerate. He will continue on Plavix and Pletal at this time. Also went over results of carotid duplex with daughter.

## 2020-12-04 ENCOUNTER — TELEMEDICINE (OUTPATIENT)
Dept: INTERNAL MEDICINE | Age: 75
End: 2020-12-04
Payer: MEDICARE

## 2020-12-04 PROBLEM — I63.9 CEREBROVASCULAR ACCIDENT (CVA) (HCC): Status: ACTIVE | Noted: 2020-12-04

## 2020-12-04 PROCEDURE — G2025 DIS SITE TELE SVCS RHC/FQHC: HCPCS | Performed by: FAMILY MEDICINE

## 2020-12-04 RX ORDER — MEMANTINE HYDROCHLORIDE 5 MG/1
TABLET ORAL
COMMUNITY
Start: 2020-11-18 | End: 2021-05-25

## 2020-12-04 ASSESSMENT — ENCOUNTER SYMPTOMS
EYE PAIN: 0
EYE REDNESS: 0
PHOTOPHOBIA: 0

## 2020-12-04 NOTE — PROGRESS NOTES
Patient: Mirna Alvarez    YOB: 1945    Date: 12/4/20       Patient Active Problem List    Diagnosis Date Noted    Abnormal ankle brachial index (ANASTACIA)      Priority: High    PAD (peripheral artery disease) ([facility] 04/21/2020    Alzheimer's dementia without behavioral disturbance ([facility] 04/21/2020    Aneurysm, common iliac artery ([facility] 04/21/2020    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[facility] 04/21/2020    Abdominal aortic aneurysm (AAA) without rupture ([address] 11/28/2017     Overview Note:     2.5 cm US 6/2018      PMR (polymyalgia rheumatica) ([address] 05/19/2014    HTN (hypertension) 03/07/2014    Hyperlipidemia 03/07/2014    DM (diabetes mellitus) ([address] 32/00/2019    Umbilical hernia without obstruction or gangrene 09/18/2013     Past Medical History:   Diagnosis Date    AAA (abdominal aortic aneurysm) (HCC)     Abdominal aortic aneurysm (AAA) without rupture ([facility]     Callus of foot 9/26/2013    Diastasis recti 9/18/2013    DM (diabetes mellitus) ([facility]     Gout     Gout     Hyperlipidemia     Hypertension     Neuropathy     secondary to DM    Neuropathy     PMR (polymyalgia rheumatica) ([address] 5/19/2014    Polymyalgia rheumatica ([address]      Past Surgical History:   Procedure Laterality Date    BACK SURGERY      CHOLECYSTECTOMY      HAND SURGERY Left 11/18/2015    dupuytren''s release with left thumb    INGUINAL HERNIA REPAIR      right side    VASCULAR SURGERY Left oct 18th 2019    lleft leg triple bypass      No family history on file. Social History     Socioeconomic History    Marital status:       Spouse name: Not on file    Number of children: Not on file    Years of education: Not on file    Highest education level: Not on file   Occupational History    Not on file   Social Needs    Financial resource strain: Not on file    Food insecurity     Worry: Not on file     Inability: Not on file    Transportation needs     Medical: Not on file     Non-medical: Not on file   Tobacco Use  Smoking status: Former Smoker     Packs/day: 1.00     Years: 30.00     Pack years: 30.00     Types: Cigarettes     Last attempt to quit: 1995     Years since quittin.0    Smokeless tobacco: Never Used   Substance and Sexual Activity    Alcohol use: No     Alcohol/week: 0.0 standard drinks    Drug use: No    Sexual activity: Not Currently   Lifestyle    Physical activity     Days per week: Not on file     Minutes per session: Not on file    Stress: Not on file   Relationships    Social connections     Talks on phone: Not on file     Gets together: Not on file     Attends Zoroastrian service: Not on file     Active member of club or organization: Not on file     Attends meetings of clubs or organizations: Not on file     Relationship status: Not on file    Intimate partner violence     Fear of current or ex partner: Not on file     Emotionally abused: Not on file     Physically abused: Not on file     Forced sexual activity: Not on file   Other Topics Concern    Not on file   Social History Narrative    Not on file     Current Outpatient Medications on File Prior to Visit   Medication Sig Dispense Refill    memantine (NAMENDA) 5 MG tablet TAKE 1 TABLET BY MOUTH ONCE DAILY      clopidogrel (PLAVIX) 75 MG tablet Take 1 tablet by mouth once daily 90 tablet 2    cilostazol (PLETAL) 50 MG tablet Take 1 tablet by mouth twice daily 180 tablet 3    meclizine (ANTIVERT) 25 MG tablet TAKE 1 TABLET BY MOUTH THREE TIMES DAILY AS NEEDED FOR DIZZINESS AND FOR NAUSEA MAY  CAUSE  DROWSINESS 90 tablet 0    metFORMIN (GLUCOPHAGE) 1000 MG tablet Take 1 tablet by mouth twice daily 180 tablet 1    sertraline (ZOLOFT) 25 MG tablet Take 1 tablet by mouth daily 90 tablet 1    albuterol sulfate HFA (PROAIR HFA) 108 (90 Base) MCG/ACT inhaler Use every 4 hours while awake for 7-10 days then PRN wheezing  Dispense with SPACER and Instruct on use. May sub Ventolin or Proventil as needed per [employer].  1 Inhaler 1  blood glucose test strips (ONE TOUCH TEST STRIPS) strip Check blood sugar  twice daily As needed. 300 each 3    Lancets MISC Test bid 300 each 3     No current facility-administered medications on file prior to visit. No Known Allergies    Chief Complaint   Patient presents with    Results     Pt has cut out coffee, and only drinks Tea. chol. is elevated. Pt wants to discuss possible injections for High Cholesterol. He has tried medications in the past but has had SE     TELEHEALTH EVALUATION -- Audio/Visual (During DURRX-40 public health emergency)    Due to COVID 19 outbreak, patient's office visit was converted to a virtual visit. Patient was contacted and agreed to proceed with a virtual visit via [email or web address] me  The risks and benefits of converting to a virtual visit were discussed in light of the current infectious disease epidemic. Patient also understood that insurance coverage and co-pays are up to their individual insurance plans. Pursuant to the emergency declaration under the [address] [place] [address] waiver authority and the [employer] Act, this Virtual  Visit was conducted, with patient's consent, to reduce the patient's risk of exposure to COVID-19 and provide continuity of care for an established patient. Services were provided through a video discussion virtually to substitute for in-person clinic visit. HPI    Hyperlipidemia:  Patient is  following a low fat, low cholesterol diet. He is  exercising regularly. OTC Supplements: none.     Lab Results   Component Value Date    CHOL 215 (H) 11/05/2020    TRIG 473 (H) 11/05/2020    HDL 35 (L) 11/05/2020    LDLCALC see below 11/05/2020     Lab Results   Component Value Date    ALT 16 11/05/2020    AST 17 11/05/2020        Patient seen with his daughter today    Pt continues to have trouble with his memory  He is on namenda low dose, not had much in SE's  Did not tolerate aricept due to loose stools  Memory seems to worsening    Seen by neurology  Work up was negative  MRI showed old remote mini infarct  They recommended statin or med to reduce cholesterol    Pt has been tried on many statins in the past   He has SE's to all of them    Review of Systems   Eyes: Negative for photophobia, pain and redness. Genitourinary: Negative for difficulty urinating, dysuria and enuresis. Neurological: Negative for dizziness, facial asymmetry and headaches. Psychiatric/Behavioral: Positive for confusion. Negative for behavioral problems and self-injury. Physical Exam    Nursing note reviewed. Constitutional:       General: no acute distress. Appearance: Normal appearance. normal weight. not ill-appearing, toxic-appearing or diaphoretic. HENT:      Head: Normocephalic and atraumatic. Right Ear: External ear normal.      Left Ear: External ear normal.      Nose: Nose normal.   Eyes:      General: No scleral icterus. Right eye: No discharge. Left eye: No discharge. Extraocular Movements: Extraocular movements intact. Conjunctiva/sclera: Conjunctivae normal.   Neck:      Musculoskeletal: Normal range of motion. Pulmonary:      Effort: Pulmonary effort is normal.   Musculoskeletal: Normal range of motion. Neurological:      General: No focal deficit present. Mental Status: alert. Mental status is at baseline. Psychiatric:         Attention and Perception: Attention and perception normal.         Mood and Affect: Mood and affect normal.         Speech: Speech normal.         Behavior: Behavior normal. Behavior is cooperative. Thought Content: Thought content normal.         Cognition and Memory: Memory normal.     Due to this being a TeleHealth encounter, evaluation of the following organ systems is limited: Vitals/Constitutional/EENT/Resp/CV/GI//MS/Neuro/Skin/Heme-Lymph-Imm.     Assessment:  Tony Ribeiro was seen today for results. Diagnoses and all orders for this visit:    Alzheimer's dementia without behavioral disturbance, unspecified timing of dementia onset ([facility]  Can try higher dose of namenda  Trial of 10 mg  If has SE's to go back to 5 mg    Hyperlipidemia, unspecified hyperlipidemia type  Cerebrovascular accident (CVA), unspecified mechanism ([facility] [facility]  Discussed CV risk factors  Discussed risks & benefits of meds  Can not take statins  Discussed repatha  Pt and his daughter agree not to try  new med due to possible cost and SE\"s   Risks and benefits of the meds discussed  Will cont to follow      Return in about 6 months (around 6/4/2021) for Diabetes, Hypertension, Hyperlipidemia.

## 2021-01-26 NOTE — TELEPHONE ENCOUNTER
Patient is requesting a refill on this medication. Patient stopped in and said that he went to  [address] [place] [address] to have this prescription refilled but they told him it had been cancelled back on 11/10/20. Patient has 4 days left of this medication. Please advise.

## 2021-02-03 PROBLEM — R41.89 COGNITIVE IMPAIRMENT: Status: ACTIVE | Noted: 2021-02-03

## 2021-02-03 PROBLEM — I65.23 BILATERAL CAROTID ARTERY STENOSIS: Status: ACTIVE | Noted: 2021-02-03

## 2021-02-03 PROBLEM — R26.0 ATAXIC GAIT: Status: ACTIVE | Noted: 2021-02-03

## 2021-05-18 ASSESSMENT — LIFESTYLE VARIABLES
AUDIT TOTAL SCORE: 0
HAVE YOU OR SOMEONE ELSE BEEN INJURED AS A RESULT OF YOUR DRINKING: NO
HOW OFTEN DURING THE LAST YEAR HAVE YOU HAD A FEELING OF GUILT OR REMORSE AFTER DRINKING: 0
HOW OFTEN DO YOU HAVE SIX OR MORE DRINKS ON ONE OCCASION: NEVER
HOW MANY STANDARD DRINKS CONTAINING ALCOHOL DO YOU HAVE ON A TYPICAL DAY: 0
AUDIT TOTAL SCORE: 2
HOW MANY STANDARD DRINKS CONTAINING ALCOHOL DO YOU HAVE ON A TYPICAL DAY: ONE OR TWO
HOW OFTEN DURING THE LAST YEAR HAVE YOU FOUND THAT YOU WERE NOT ABLE TO STOP DRINKING ONCE YOU HAD STARTED: NEVER
HAS A RELATIVE, FRIEND, DOCTOR, OR ANOTHER HEALTH PROFESSIONAL EXPRESSED CONCERN ABOUT YOUR DRINKING OR SUGGESTED YOU CUT DOWN: NO
HAVE YOU OR SOMEONE ELSE BEEN INJURED AS A RESULT OF YOUR DRINKING: 0
AUDIT-C TOTAL SCORE: 2
HOW OFTEN DURING THE LAST YEAR HAVE YOU FAILED TO DO WHAT WAS NORMALLY EXPECTED FROM YOU BECAUSE OF DRINKING: NEVER
HOW OFTEN DURING THE LAST YEAR HAVE YOU BEEN UNABLE TO REMEMBER WHAT HAPPENED THE NIGHT BEFORE BECAUSE YOU HAD BEEN DRINKING: NEVER
HOW OFTEN DURING THE LAST YEAR HAVE YOU NEEDED AN ALCOHOLIC DRINK FIRST THING IN THE MORNING TO GET YOURSELF GOING AFTER A NIGHT OF HEAVY DRINKING: NEVER
HOW OFTEN DO YOU HAVE A DRINK CONTAINING ALCOHOL: TWO TO FOUR TIMES A MONTH
HOW OFTEN DO YOU HAVE A DRINK CONTAINING ALCOHOL: 2
AUDIT-C TOTAL SCORE: 0
HOW OFTEN DURING THE LAST YEAR HAVE YOU FAILED TO DO WHAT WAS NORMALLY EXPECTED FROM YOU BECAUSE OF DRINKING: 0
HOW OFTEN DURING THE LAST YEAR HAVE YOU HAD A FEELING OF GUILT OR REMORSE AFTER DRINKING: NEVER

## 2021-05-18 ASSESSMENT — PATIENT HEALTH QUESTIONNAIRE - PHQ9
SUM OF ALL RESPONSES TO PHQ QUESTIONS 1-9: 0
2. FEELING DOWN, DEPRESSED OR HOPELESS: 0

## 2021-05-25 ENCOUNTER — OFFICE VISIT (OUTPATIENT)
Dept: INTERNAL MEDICINE | Age: 76
End: 2021-05-25
Payer: MEDICARE

## 2021-05-25 VITALS
HEART RATE: 68 BPM | SYSTOLIC BLOOD PRESSURE: 136 MMHG | OXYGEN SATURATION: 98 % | TEMPERATURE: 98.5 F | WEIGHT: 162 LBS | HEIGHT: 70 IN | DIASTOLIC BLOOD PRESSURE: 70 MMHG | BODY MASS INDEX: 23.19 KG/M2

## 2021-05-25 DIAGNOSIS — Z00.00 ROUTINE GENERAL MEDICAL EXAMINATION AT A HEALTH CARE FACILITY: Primary | ICD-10-CM

## 2021-05-25 DIAGNOSIS — E11.9 TYPE 2 DIABETES MELLITUS WITHOUT COMPLICATION, WITHOUT LONG-TERM CURRENT USE OF INSULIN (HCC): ICD-10-CM

## 2021-05-25 LAB — HBA1C MFR BLD: 7.6 %

## 2021-05-25 PROCEDURE — 83036 HEMOGLOBIN GLYCOSYLATED A1C: CPT | Performed by: FAMILY MEDICINE

## 2021-05-25 PROCEDURE — G0439 PPPS, SUBSEQ VISIT: HCPCS | Performed by: FAMILY MEDICINE

## 2021-05-25 PROCEDURE — 3051F HG A1C>EQUAL 7.0%<8.0%: CPT | Performed by: FAMILY MEDICINE

## 2021-05-25 SDOH — ECONOMIC STABILITY: FOOD INSECURITY: WITHIN THE PAST 12 MONTHS, THE FOOD YOU BOUGHT JUST DIDN'T LAST AND YOU DIDN'T HAVE MONEY TO GET MORE.: NEVER TRUE

## 2021-05-25 SDOH — ECONOMIC STABILITY: FOOD INSECURITY: WITHIN THE PAST 12 MONTHS, YOU WORRIED THAT YOUR FOOD WOULD RUN OUT BEFORE YOU GOT MONEY TO BUY MORE.: NEVER TRUE

## 2021-05-25 ASSESSMENT — PATIENT HEALTH QUESTIONNAIRE - PHQ9
1. LITTLE INTEREST OR PLEASURE IN DOING THINGS: 0
SUM OF ALL RESPONSES TO PHQ QUESTIONS 1-9: 0
SUM OF ALL RESPONSES TO PHQ9 QUESTIONS 1 & 2: 0
2. FEELING DOWN, DEPRESSED OR HOPELESS: 0

## 2021-05-25 ASSESSMENT — LIFESTYLE VARIABLES
HOW OFTEN DO YOU HAVE A DRINK CONTAINING ALCOHOL: 2
AUDIT-C TOTAL SCORE: 2

## 2021-05-25 NOTE — PATIENT INSTRUCTIONS
Personalized Preventive Plan for [facility] [facility] - 5/25/2021  Medicare offers a range of preventive health benefits. Some of the tests and screenings are paid in full while other may be subject to a deductible, co-insurance, and/or copay. Some of these benefits include a comprehensive review of your medical history including lifestyle, illnesses that may run in your family, and various assessments and screenings as appropriate. After reviewing your medical record and screening and assessments performed today your provider may have ordered immunizations, labs, imaging, and/or referrals for you. A list of these orders (if applicable) as well as your Preventive Care list are included within your After Visit Summary for your review. Other Preventive Recommendations:    · A preventive eye exam performed by an eye specialist is recommended every 1-2 years to screen for glaucoma; cataracts, macular degeneration, and other eye disorders. · A preventive dental visit is recommended every 6 months. · Try to get at least 150 minutes of exercise per week or 10,000 steps per day on a pedometer . · Order or download the FREE \"Exercise & Physical Activity: Your Everyday Guide\" from [employer]. Call 4-925.432.8864 or search [employer] online. · You need 7200-7223 mg of calcium and 8103-5596 IU of vitamin D per day. It is possible to meet your calcium requirement with diet alone, but a vitamin D supplement is usually necessary to meet this goal.  · When exposed to the sun, use a sunscreen that protects against both UVA and UVB radiation with an SPF of 30 or greater. Reapply every 2 to 3 hours or after sweating, drying off with a towel, or swimming. · Always wear a seat belt when traveling in a car. Always wear a helmet when riding a bicycle or motorcycle.

## 2021-05-25 NOTE — PROGRESS NOTES
Medicare Annual Wellness Visit  Name: Stu Vazquez Date: 2021   MRN: [id number] Sex: Male   Age: 67 y.o. Ethnicity: Non-/Non    : 1945 Race: Randolph Quintana is here for Medicare AWV (pt states he is trying to get off of most of his medications, feels they are messing up his insides. would like to try for a month or so, but will stay on metformin)    Screenings for behavioral, psychosocial and functional/safety risks, and cognitive dysfunction are all negative except as indicated below. These results, as well as other patient data from the [address] form, are documented in Flowsheets linked to this Encounter. No Known Allergies    Prior to Visit Medications    Medication Sig Taking? Authorizing Provider   metFORMIN (GLUCOPHAGE) 1000 MG tablet Take 1 tablet by mouth twice daily Yes Shaw Wagner MD   clopidogrel (PLAVIX) 75 MG tablet Take 1 tablet by mouth once daily Yes Shaw Wagner MD   meclizine (ANTIVERT) 25 MG tablet TAKE 1 TABLET BY MOUTH THREE TIMES DAILY AS NEEDED FOR DIZZINESS AND FOR NAUSEA MAY  CAUSE  DROWSINESS Yes Shaw Wagner MD   memantine (NAMENDA) 5 MG tablet TAKE 1 TABLET BY MOUTH ONCE DAILY  Patient not taking: Reported on 2021  Historical Provider, MD   cilostazol (PLETAL) 50 MG tablet Take 1 tablet by mouth twice daily  Shaw Wagner MD   sertraline (ZOLOFT) 25 MG tablet Take 1 tablet by mouth daily  Shaw Wagner MD   blood glucose test strips (ONE TOUCH TEST STRIPS) strip Check blood sugar  twice daily As needed.   Shaw Wagner MD   Lancets MISC Test bid  Shaw Wagner MD       Past Medical History:   Diagnosis Date    [facility]) [facility]     Abdominal aortic aneurysm (AAA) without rupture ([address]     Callus of foot 2013    Diastasis recti 2013    DM (diabetes mellitus) ([facility]     Gout     Gout     Hyperlipidemia     Hypertension     Neuropathy     secondary to DM    Neuropathy  PM[facility] 5/19/2014    Polymyalgia rheumatica ([facility]        Past Surgical History:   Procedure Laterality Date    BACK SURGERY      CHOLECYSTECTOMY      HAND SURGERY Left 11/18/2015    dupuytren''s release with left thumb    INGUINAL HERNIA REPAIR      right side    VASCULAR SURGERY Left oct 18th 2019    lleft leg triple bypass        No family history on file. CareTeam (Including outside providers/suppliers regularly involved in providing care):   Patient Care Team:  Jalyn Monroe MD as PCP - General (Family Medicine)  Jalyn Monroe MD as PCP - [facility]  Dora Mckinney MD as Consulting Physician (Rheumatology)  Dre Quick DO as Cardiologist (Cardiology)    Wt Readings from Last 3 Encounters:   05/25/21 162 lb (73.5 kg)   11/10/20 169 lb (76.7 kg)   11/05/20 169 lb 4.8 oz (76.8 kg)     Vitals:    05/25/21 1127   BP: 136/70   Pulse: 68   Temp: 98.5 °F (36.9 °C)   TempSrc: Temporal   SpO2: 98%   Weight: 162 lb (73.5 kg)   Height: 5' 10\" (1.778 m)     Body mass index is 23.24 kg/m². Based upon direct observation of the patient, evaluation of cognition reveals remote memory intact, recent memory impaired. Constitutional:  Appears well-developed and well-nourished. No distress. HENT:   Head: Normocephalic and atraumatic. Right Ear: External ear normal.   Left Ear: External ear normal.   Nose: Nose normal.   Eyes: Conjunctivae and EOM are normal.  Right eye exhibits no discharge. Left eye exhibits no discharge. No scleral icterus. Neck: Normal range of motion. Cardiovascular: Normal rate, regular rhythm and normal heart sounds. No murmur heard. Pulmonary/Chest: Effort normal and breath sounds normal. No respiratory distress. no wheezes. no rales. no tenderness. Musculoskeletal: Normal range of motion. Neurological: alert. Skin: not diaphoretic. Psychiatric: normal mood and affect.  behavior is normal. Judgment and thought content normal. Nursing note and vitals reviewed. Patient's complete Health Risk Assessment and screening values have been reviewed and are found in Flowsheets. The following problems were reviewed today and where indicated follow up appointments were made and/or referrals ordered. Positive Risk Factor Screenings with Interventions:     Fall Risk:  2 or more falls in past year?: (!) yes  Fall with injury in past year?: no  Fall Risk Interventions:    · Patient declines any further evaluation/treatment for this issue  · discussed PT        General Health and ACP:  General  In general, how would you say your health is?: Fair  In the past 7 days, have you experienced any of the following? New or Increased Pain, New or Increased Fatigue, Loneliness, Social Isolation, Stress or Anger?: (!) Loneliness, Social Isolation  Do you get the social and emotional support that you need?: Yes  Do you have a Living Will?: Yes  Advance Directives     Power of [employer] Will ACP-Advance Directive ACP-Power of     Not on File Not on File Not on File Not on File      General Health Risk Interventions:  · Loneliness: patient declines any further intervention for this issue  · Social isolation: patient declines any further intervention for this issue    Health Habits/Nutrition:  Health Habits/Nutrition  Do you exercise for at least 20 minutes 2-3 times per week?: Yes  Have you lost any weight without trying in the past 3 months?: (!) Yes  Do you eat only one meal per day?: No  Have you seen the dentist within the past year?: N/A - wear dentures  Body mass index: 23.24  Health Habits/Nutrition Interventions:  · Nutritional issues:  educational materials for healthy, well-balanced diet provided      ADL:  ADLs  In the past 7 days, did you need help from others to perform any of the following everyday activities?  Eating, dressing, grooming, bathing, toileting, or walking/balance?: None  In the past 7 days, did you need help from

## 2021-07-21 ENCOUNTER — NURSE TRIAGE (OUTPATIENT)
Dept: OTHER | Facility: CLINIC | Age: 76
End: 2021-07-21

## 2021-07-21 ENCOUNTER — VIRTUAL VISIT (OUTPATIENT)
Dept: INTERNAL MEDICINE | Age: 76
End: 2021-07-21
Payer: MEDICARE

## 2021-07-21 DIAGNOSIS — R05.9 COUGH: Primary | ICD-10-CM

## 2021-07-21 DIAGNOSIS — R09.89 CHEST CONGESTION: ICD-10-CM

## 2021-07-21 DIAGNOSIS — R05.9 COUGH: ICD-10-CM

## 2021-07-21 DIAGNOSIS — R50.9 FEVER, UNSPECIFIED FEVER CAUSE: ICD-10-CM

## 2021-07-21 DIAGNOSIS — R19.7 DIARRHEA, UNSPECIFIED TYPE: ICD-10-CM

## 2021-07-21 PROCEDURE — 99213 OFFICE O/P EST LOW 20 MIN: CPT | Performed by: FAMILY MEDICINE

## 2021-07-21 RX ORDER — PREDNISONE 20 MG/1
TABLET ORAL
Qty: 8 TABLET | Refills: 0 | Status: SHIPPED | OUTPATIENT
Start: 2021-07-21 | End: 2021-09-07

## 2021-07-21 RX ORDER — AZITHROMYCIN 250 MG/1
250 TABLET, FILM COATED ORAL SEE ADMIN INSTRUCTIONS
Qty: 6 TABLET | Refills: 0 | Status: SHIPPED | OUTPATIENT
Start: 2021-07-21 | End: 2021-07-26

## 2021-07-21 ASSESSMENT — ENCOUNTER SYMPTOMS
EYE DISCHARGE: 1
EYE REDNESS: 0
DIARRHEA: 1
SORE THROAT: 1
SHORTNESS OF BREATH: 1
WHEEZING: 1
COUGH: 1
EYE PAIN: 0

## 2021-07-21 NOTE — TELEPHONE ENCOUNTER
Received call from D at [facility] with Red Flag Complaint. Brief description of triage: SOB even at rest, cough. Triage indicates for patient to go to ED now but daughter states that she tried to get him to go to ED last night and he refused. Care advice provided, patient verbalizes understanding; denies any other questions or concerns; instructed to call back for any new or worsening symptoms. Writer provided warm transfer to Linus Eastman at PCP office to further assist    Attention Provider: Thank you for allowing me to participate in the care of your patient. The patient was connected to triage in response to information provided to the [facility]. Please do not respond through this encounter as the response is not directed to a shared pool. Reason for Disposition   MODERATE difficulty breathing (e.g., speaks in phrases, SOB even at rest, pulse 100-120) of new onset or worse than normal    Answer Assessment - Initial Assessment Questions  1. RESPIRATORY STATUS: \"Describe your breathing? \" (e.g., wheezing, shortness of breath, unable to speak, severe coughing)       SOB even at rest    2. ONSET: \"When did this breathing problem begin? \"       2 days ago    3. PATTERN \"Does the difficult breathing come and go, or has it been constant since it started? \"       Constant    4. SEVERITY: \"How bad is your breathing? \" (e.g., mild, moderate, severe)     - MILD: No SOB at rest, mild SOB with walking, speaks normally in sentences, can lay down, no retractions, pulse < 100.     - MODERATE: SOB at rest, SOB with minimal exertion and prefers to sit, cannot lie down flat, speaks in phrases, mild retractions, audible wheezing, pulse 100-120.     - SEVERE: Very SOB at rest, speaks in single words, struggling to breathe, sitting hunched forward, retractions, pulse > 120       SOB at rest    5. RECURRENT SYMPTOM: \"Have you had difficulty breathing before? \" If so, ask: \"When was the last time? \" and \"What happened that time? \"       Denies    6. CARDIAC HISTORY: \"Do you have any history of heart disease? \" (e.g., heart attack, angina, bypass surgery, angioplasty)       Denies    7. LUNG HISTORY: \"Do you have any history of lung disease? \"  (e.g., pulmonary embolus, asthma, emphysema)      Denies    8. CAUSE: \"What do you think is causing the breathing problem? \"       Cold    9. OTHER SYMPTOMS: \"Do you have any other symptoms? (e.g., dizziness, runny nose, cough, chest pain, fever)      Cough that is terrible, fatigue, SOB    10. PREGNANCY: \"Is there any chance you are pregnant? \" \"When was your last menstrual period? \"        NA    11. TRAVEL: \"Have you traveled out of the country in the last month? \" (e.g., travel history, exposures)        Denies    Protocols used: BREATHING DIFFICULTY-ADULT-[place]

## 2021-07-21 NOTE — PROGRESS NOTES
Patient: Theresa Ag    YOB: 1945    Date: 7/21/21       Patient Active Problem List    Diagnosis Date Noted    Abnormal ankle brachial index (ANASTACIA)      Priority: High    Ataxic gait 02/03/2021    Cognitive impairment 02/03/2021    Bilateral carotid artery stenosis 02/03/2021    Cerebrovascular accident (CVA) ([facility] [address] 12/04/2020    PAD (peripheral artery disease) ([address] 04/21/2020    Alzheimer's dementia without behavioral disturbance ([address] 04/21/2020    Aneurysm, common iliac artery ([address] 04/21/2020    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[address] 04/21/2020    Abdominal aortic aneurysm (AAA) without rupture ([address] 11/28/2017     Overview Note:     2.5 cm US 6/2018      PMR (polymyalgia rheumatica) ([address] 05/19/2014    HTN (hypertension) 03/07/2014    Hyperlipidemia 03/07/2014    Type 2 diabetes mellitus without complication, without long-term current use of insulin ([address] 07/36/0365    Umbilical hernia without obstruction or gangrene 09/18/2013     Past Medical History:   Diagnosis Date    AAA (abdominal aortic aneurysm) (HCC)     Abdominal aortic aneurysm (AAA) without rupture ([address]     Callus of foot 9/26/2013    Diastasis recti 9/18/2013    DM (diabetes mellitus) ([address]     Gout     Gout     Hyperlipidemia     Hypertension     Neuropathy     secondary to DM    Neuropathy     PMR (polymyalgia rheumatica) ([address] 5/19/2014    Polymyalgia rheumatica ([address]      Past Surgical History:   Procedure Laterality Date    BACK SURGERY      CHOLECYSTECTOMY      HAND SURGERY Left 11/18/2015    dupuytren''s release with left thumb    INGUINAL HERNIA REPAIR      right side    VASCULAR SURGERY Left oct 18th 2019    lleft leg triple bypass      No family history on file. Social History     Socioeconomic History    Marital status:       Spouse name: Not on file    Number of children: Not on file    Years of education: Not on file    Highest education level: Not on file   Occupational History    Not on file   Tobacco Use    Smoking status: Former Smoker     Packs/day: 1.00     Years: 30.00     Pack years: 30.00     Types: Cigarettes     Quit date: 1995     Years since quittin.6    Smokeless tobacco: Never Used   Vaping Use    Vaping Use: Never used   Substance and Sexual Activity    Alcohol use: No     Alcohol/week: 0.0 standard drinks    Drug use: No    Sexual activity: Not Currently   Other Topics Concern    Not on file   Social History Narrative    Not on file     Social Determinants of Health     Financial Resource Strain: Low Risk     Difficulty of Paying Living Expenses: Not hard at all   Food Insecurity: No Food Insecurity    Worried About Running Out of Food in the Last Year: Never true    Rosy of Food in the Last Year: Never true   Transportation Needs:     Lack of Transportation (Medical):  Lack of Transportation (Non-Medical):    Physical Activity:     Days of Exercise per Week:     Minutes of Exercise per Session:    Stress:     Feeling of Stress :    Social Connections:     Frequency of Communication with Friends and Family:     Frequency of Social Gatherings with Friends and Family:     Attends [employer] Services:     Active Member of Clubs or Organizations:     Attends Club or Organization Meetings:     Marital Status:    Intimate Partner Violence:     Fear of Current or Ex-Partner:     Emotionally Abused:     Physically Abused:     Sexually Abused:      Current Outpatient Medications on File Prior to Visit   Medication Sig Dispense Refill    metFORMIN (GLUCOPHAGE) 1000 MG tablet Take 1 tablet by mouth twice daily 180 tablet 1    clopidogrel (PLAVIX) 75 MG tablet Take 1 tablet by mouth once daily 90 tablet 2    cilostazol (PLETAL) 50 MG tablet Take 1 tablet by mouth twice daily 180 tablet 3    meclizine (ANTIVERT) 25 MG tablet TAKE 1 TABLET BY MOUTH THREE TIMES DAILY AS NEEDED FOR DIZZINESS AND FOR NAUSEA MAY  CAUSE  DROWSINESS 90 tablet 0    Misc. Devices [facility] Custom Orthotics 1 Device 0    blood glucose test strips (ONE TOUCH TEST STRIPS) strip Check blood sugar  twice daily As needed. 300 each 3    Lancets MISC Test bid 300 each 3     No current facility-administered medications on file prior to visit. No Known Allergies    Chief Complaint   Patient presents with    Shortness of Breath     family had cold symptoms for past three weeks. pt winded just talking.  Cough     non productive cough causing rib pain, but concerned about pneumonia    Chest Congestion     cold symptoms for three days.  Fever     chills and sweats    Diarrhea     2 weeks ago for 2-3 days     TELEHEALTH EVALUATION -- Audio/Visual (During UCMTD-68 public health emergency)    Due to [place] 19 outbreak, patient's office visit was converted to a virtual visit. Patient was contacted and agreed to proceed with a virtual visit via D[employer]. me  The risks and benefits of converting to a virtual visit were discussed in light of the current infectious disease epidemic. Patient also understood that insurance coverage and co-pays are up to their individual insurance plans. Pursuant to the emergency declaration under the [facility], [facility] waiver authority and the [employer] Act, this Virtual  Visit was conducted, with patient's consent, to reduce the patient's risk of exposure to COVID-19 and provide continuity of care for an established patient. Services were provided through a video discussion virtually to substitute for in-person clinic visit.     HPI    Feels like now \"cold\" gone into the chest  Cough is hurting his ribs now   See HPI for details  Entire family was sick, now he is     Diarrhea, congestion, cough, headache, fever, upset stomach  He feels he is getting over the \"hump\" now   Symptoms started about a week ago     Review of Systems   Constitutional: Positive for appetite change, chills, diaphoresis, fatigue and fever. HENT: Positive for sore throat. Negative for ear pain and postnasal drip. Eyes: Positive for discharge. Negative for pain and redness. Respiratory: Positive for cough, shortness of breath and wheezing. Gastrointestinal: Positive for diarrhea. Physical Exam    Nursing note reviewed. Constitutional:       General: no acute distress. Appearance: Normal appearance. normal weight. not ill-appearing, toxic-appearing or diaphoretic. HENT:      Head: Normocephalic and atraumatic. Right Ear: External ear normal.      Left Ear: External ear normal.      Nose: Nose normal.   Eyes:      General: No scleral icterus. Right eye: No discharge. Left eye: No discharge. Extraocular Movements: Extraocular movements intact. Conjunctiva/sclera: Conjunctivae normal.   Neck:      Musculoskeletal: Normal range of motion. Pulmonary:      Effort: Pulmonary effort is normal.   Musculoskeletal: Normal range of motion. Neurological:      General: No focal deficit present. Mental Status: alert. Mental status is at baseline. Psychiatric:         Attention and Perception: Attention and perception normal.         Mood and Affect: Mood and affect normal.         Speech: Speech normal.         Behavior: Behavior normal. Behavior is cooperative. Thought Content: Thought content normal.         Cognition and Memory: Memory normal.     Due to this being a TeleHealth encounter, evaluation of the following organ systems is limited: Vitals/Constitutional/EENT/Resp/CV/GI//MS/Neuro/Skin/Heme-Lymph-Imm. Assessment:  Eduin Mcdermott was seen today for shortness of breath, cough, chest congestion, fever and diarrhea. Diagnoses and all orders for this visit:    Cough  -     predniSONE (DELTASONE) 20 MG tablet; Take 2 tabs daily x 4 days  -     azithromycin (ZITHROMAX) 250 MG tablet;  Take 1 tablet by mouth See Admin Instructions for 5 days 500mg on day 1 followed by 250mg on days 2 - 5  -     Covid-19, Antibody; Future  -     COVID-19; Future    Chest congestion  -     predniSONE (DELTASONE) 20 MG tablet; Take 2 tabs daily x 4 days  -     azithromycin (ZITHROMAX) 250 MG tablet; Take 1 tablet by mouth See Admin Instructions for 5 days 500mg on day 1 followed by 250mg on days 2 - 5  -     Covid-19, Antibody; Future  -     COVID-19; Future    Fever, unspecified fever cause  -     predniSONE (DELTASONE) 20 MG tablet; Take 2 tabs daily x 4 days  -     azithromycin (ZITHROMAX) 250 MG tablet; Take 1 tablet by mouth See Admin Instructions for 5 days 500mg on day 1 followed by 250mg on days 2 - 5  -     Covid-19, Antibody; Future  -     COVID-19; Future    Diarrhea, unspecified type  -     predniSONE (DELTASONE) 20 MG tablet; Take 2 tabs daily x 4 days  -     azithromycin (ZITHROMAX) 250 MG tablet; Take 1 tablet by mouth See Admin Instructions for 5 days 500mg on day 1 followed by 250mg on days 2 - 5  -     Covid-19, Antibody; Future  -     COVID-19; Future    Suspicious for Covid, pt is not vaccinated  Recommend covid test, in meantime quarantine   Trial of z-pack, steroid, albuterol for symptoms relief   Go to ER if Sx's worsen      Yanet Harris MD    Patient is aware this encounter is billable to insurance. This encounter occurred with patient at home while provider was at the office.

## 2021-07-22 LAB — SARS-COV-2, PCR: NOT DETECTED

## 2021-07-23 NOTE — TELEPHONE ENCOUNTER
Requesting medication refill.  Please approve or deny this request.    Rx requested:  Requested Prescriptions     Pending Prescriptions Disp Refills    metFORMIN (GLUCOPHAGE) 1000 MG tablet [Pharmacy Med Name: metFORMIN HCl 1000 MG Oral Tablet] 180 tablet 0     Sig: Take 1 tablet by mouth twice daily       Last Office Visit:   7/21/2021        REASON LAST SEEN AND BY WHO:    SOB, cough, chest congstion; Dr. Karen Musa PCP VISIT: COPY AND PASTE FROM LAST PCP NOTE    Suspicious for Covid, pt is not vaccinated  Recommend covid test, in meantime quarantine   Trial of z-pack, steroid, albuterol for symptoms relief   Go to ER if Sx's worsen      PATIENT CONTACTED FOR A FOLLOW UP APPT: YES OR NO    Scheduled   Next Visit Date:  Future Appointments   Date Time Provider Lotus Zazueta   11/30/2021  9:45 AM S[address]NA [address]   5/26/2022  8:45 AM Betzaida Calderon MD [facility]

## 2021-08-27 DIAGNOSIS — I71.40 ABDOMINAL AORTIC ANEURYSM (AAA) WITHOUT RUPTURE: ICD-10-CM

## 2021-08-27 DIAGNOSIS — I72.4 POPLITEAL ANEURYSM (HCC): ICD-10-CM

## 2021-08-27 DIAGNOSIS — I72.3 ANEURYSM, COMMON ILIAC ARTERY (HCC): ICD-10-CM

## 2021-08-27 DIAGNOSIS — I73.9 PAD (PERIPHERAL ARTERY DISEASE) (HCC): ICD-10-CM

## 2021-08-27 NOTE — TELEPHONE ENCOUNTER
Requesting medication refill.  Please approve or deny this request.    Rx requested:  Requested Prescriptions     Pending Prescriptions Disp Refills    clopidogrel (PLAVIX) 75 MG tablet [Pharmacy Med Name: Clopidogrel Bisulfate 75 MG Oral Tablet] 30 tablet 0     Sig: Take 1 tablet by mouth once daily       Last Office Visit:   7/21/2021        REASON LAST SEEN AND BY WHO:    cough     FOLLOW UP PLAN FROM LAST PCP VISIT: COPY AND PASTE FROM LAST PCP NOTE    none      PATIENT CONTACTED FOR A FOLLOW UP APPT: YES OR NO    no    Next Visit Date:  Future Appointments   Date Time Provider Lotus Zazueta   11/30/2021  9:45 AM MD Júnior Antoine [address]   5/26/2022  8:45 AM MD Júnior Antoine [address]

## 2021-08-28 RX ORDER — CLOPIDOGREL BISULFATE 75 MG/1
TABLET ORAL
Qty: 30 TABLET | Refills: 0 | Status: SHIPPED | OUTPATIENT
Start: 2021-08-28 | End: 2021-10-11 | Stop reason: SDUPTHER

## 2021-09-07 ENCOUNTER — NURSE TRIAGE (OUTPATIENT)
Dept: OTHER | Facility: CLINIC | Age: 76
End: 2021-09-07

## 2021-09-07 ENCOUNTER — OFFICE VISIT (OUTPATIENT)
Dept: INTERNAL MEDICINE | Age: 76
End: 2021-09-07
Payer: MEDICARE

## 2021-09-07 VITALS
WEIGHT: 165 LBS | OXYGEN SATURATION: 97 % | SYSTOLIC BLOOD PRESSURE: 138 MMHG | HEART RATE: 68 BPM | TEMPERATURE: 99 F | DIASTOLIC BLOOD PRESSURE: 60 MMHG | BODY MASS INDEX: 23.68 KG/M2

## 2021-09-07 DIAGNOSIS — M10.9 GOUTY ARTHRITIS OF GREAT TOE: Primary | ICD-10-CM

## 2021-09-07 PROCEDURE — 99213 OFFICE O/P EST LOW 20 MIN: CPT | Performed by: PHYSICIAN ASSISTANT

## 2021-09-07 RX ORDER — FERROUS SULFATE 325(65) MG
325 TABLET ORAL
COMMUNITY
End: 2022-03-24 | Stop reason: ALTCHOICE

## 2021-09-07 RX ORDER — INDOMETHACIN 50 MG/1
50 CAPSULE ORAL 3 TIMES DAILY
Qty: 21 CAPSULE | Refills: 0 | Status: SHIPPED | OUTPATIENT
Start: 2021-09-07 | End: 2022-03-24 | Stop reason: ALTCHOICE

## 2021-09-07 ASSESSMENT — ENCOUNTER SYMPTOMS: COLOR CHANGE: 1

## 2021-09-07 NOTE — PROGRESS NOTES
Mirna Alvarez (: ) is a 67 y.o. male, Established patient, here for evaluation of the following chief complaint(s):  Gout (pain in both feet, big toe. left better. )        ASSESSMENT/PLAN:  1. Gouty arthritis of great toe  - medrol contraindicated, pt is diabetic   - indomethacin (INDOCIN) 50 MG capsule; Take 1 capsule by mouth 3 times daily  Dispense: 21 capsule; Refill: 0  - rest , less weight bearing until improved        No follow-ups on file. SUBJECTIVE/OBJECTIVE:  HPI      Joint pain in both great toes   States the toe is slightly better   Right one is red and   thinks that he is getting this gram eating game meat , he likes deer meat       Review of Systems   Musculoskeletal: Positive for arthralgias and gait problem. Skin: Positive for color change. Physical Exam  Vitals reviewed. Skin:     General: Skin is warm. Findings: Erythema (and tenderness right great toe, MTP joint ) present. Neurological:      Mental Status: He is alert. Vitals:    21 1454   BP: 138/60   Pulse: 68   Temp: 99 °F (37.2 °C)   TempSrc: Infrared   SpO2: 97%   Weight: 165 lb (74.8 kg)                 An electronic signature was used to authenticate this note.     --DARCI Nixon

## 2021-10-08 DIAGNOSIS — M10.9 ACUTE GOUT INVOLVING TOE, UNSPECIFIED CAUSE, UNSPECIFIED LATERALITY: Primary | ICD-10-CM

## 2021-10-08 RX ORDER — METHYLPREDNISOLONE 4 MG/1
TABLET ORAL
Qty: 1 KIT | Refills: 0 | Status: SHIPPED | OUTPATIENT
Start: 2021-10-08 | End: 2021-10-14

## 2021-10-11 DIAGNOSIS — I71.40 ABDOMINAL AORTIC ANEURYSM (AAA) WITHOUT RUPTURE: ICD-10-CM

## 2021-10-11 DIAGNOSIS — I72.3 ANEURYSM, COMMON ILIAC ARTERY (HCC): ICD-10-CM

## 2021-10-11 DIAGNOSIS — I72.4 POPLITEAL ANEURYSM (HCC): ICD-10-CM

## 2021-10-11 DIAGNOSIS — I73.9 PAD (PERIPHERAL ARTERY DISEASE) (HCC): ICD-10-CM

## 2021-10-11 NOTE — TELEPHONE ENCOUNTER
Comments:     Last Office Visit (last PCP visit):   9/7/2021    Next Visit Date:  Future Appointments   Date Time Provider Lotus Carlita   11/15/2021  2:15 PM SUSANA [address]   5/31/2022  1:00 PM DARCI Torres [facility]       **If hasn't been seen in over a year OR hasn't followed up according to last diabetes/ADHD visit, make appointment for patient before sending refill to provider.     Rx requested:  Requested Prescriptions     Pending Prescriptions Disp Refills    clopidogrel (PLAVIX) 75 MG tablet 30 tablet 0

## 2021-10-14 RX ORDER — CLOPIDOGREL BISULFATE 75 MG/1
TABLET ORAL
Qty: 90 TABLET | Refills: 0 | Status: SHIPPED | OUTPATIENT
Start: 2021-10-14 | End: 2022-01-21 | Stop reason: SDUPTHER

## 2021-11-15 ENCOUNTER — OFFICE VISIT (OUTPATIENT)
Dept: INTERNAL MEDICINE | Age: 76
End: 2021-11-15
Payer: MEDICARE

## 2021-11-15 VITALS
OXYGEN SATURATION: 97 % | BODY MASS INDEX: 24.2 KG/M2 | WEIGHT: 169 LBS | HEART RATE: 69 BPM | SYSTOLIC BLOOD PRESSURE: 138 MMHG | TEMPERATURE: 96.8 F | HEIGHT: 70 IN | DIASTOLIC BLOOD PRESSURE: 60 MMHG

## 2021-11-15 DIAGNOSIS — I65.23 BILATERAL CAROTID ARTERY STENOSIS: ICD-10-CM

## 2021-11-15 DIAGNOSIS — I10 PRIMARY HYPERTENSION: ICD-10-CM

## 2021-11-15 DIAGNOSIS — E78.5 HYPERLIPIDEMIA, UNSPECIFIED HYPERLIPIDEMIA TYPE: ICD-10-CM

## 2021-11-15 DIAGNOSIS — E11.9 TYPE 2 DIABETES MELLITUS WITHOUT COMPLICATION, WITHOUT LONG-TERM CURRENT USE OF INSULIN (HCC): Primary | ICD-10-CM

## 2021-11-15 DIAGNOSIS — I71.40 ABDOMINAL AORTIC ANEURYSM (AAA) WITHOUT RUPTURE: ICD-10-CM

## 2021-11-15 LAB — HBA1C MFR BLD: 8 %

## 2021-11-15 PROCEDURE — 83036 HEMOGLOBIN GLYCOSYLATED A1C: CPT | Performed by: PHYSICIAN ASSISTANT

## 2021-11-15 PROCEDURE — 3052F HG A1C>EQUAL 8.0%<EQUAL 9.0%: CPT | Performed by: PHYSICIAN ASSISTANT

## 2021-11-15 PROCEDURE — 99214 OFFICE O/P EST MOD 30 MIN: CPT | Performed by: PHYSICIAN ASSISTANT

## 2021-11-15 ASSESSMENT — ENCOUNTER SYMPTOMS
GASTROINTESTINAL NEGATIVE: 1
RESPIRATORY NEGATIVE: 1

## 2021-11-15 NOTE — PROGRESS NOTES
Sung Edwards (: ) is a 67 y.o. male, Established patient, here for evaluation of the following chief complaint(s):  Diabetes (6 month f/u)      PCP:  DARCI Garvey      HPI    Diabetes Mellitus Type 2:   Medication compliance:  compliant all of the time  Current medication regimen: metformin 1000 mg BID    Diet compliance:  eats what he wants, reently lost his wife, and she did the cooking  Weight trend: stable  Current exercise: no regular exercise  Current symptoms/problems include none. Home blood sugar records:  only checks when he feels off  Any episodes of hypoglycemia? Yes 50's  Tobacco history: He  reports that he quit smoking about 25 years ago. His smoking use included cigarettes. He has a 30.00 pack-year smoking history. He has never used smokeless tobacco.   Daily Aspirin? No:   Known diabetic complications: none    Hypertension:    Home blood pressure monitoring: No.  He is not adherent to a low sodium diet. Patient denies chest pain. Use of agents associated with hypertension: none. Hyperlipidemia:  No new myalgias or GI upset on none.        Diabetes and Hypertension Visit Information    BP Readings from Last 3 Encounters:   11/15/21 138/60   21 138/60   21 136/70          Hemoglobin A1C (%)   Date Value   11/15/2021 8.0   2021 7.6   2020 8.5 (H)     Microalbumin, Random Urine (mg/dL)   Date Value   2018 <1.20     LDL Calculated (mg/dL)   Date Value   2020 see below     HDL (mg/dL)   Date Value   2020 35 (L)     BUN (mg/dL)   Date Value   2020 12     CREATININE (mg/dL)   Date Value   2020 1.01     Glucose (mg/dL)   Date Value   2020 96        Patient Care Team:  DARCI Garvey as PCP - General (Physician Assistant)  DARCI Garvey as PCP - [facility]  Ramon Ferrari MD as Consulting Physician (Rheumatology)  Mati Tejada DO as Cardiologist (Cardiology)         Medical History Review  Past Medical, Family, and Social History reviewed and does not contribute to the patient presenting condition    Health Maintenance   Topic Date Due    Hepatitis C screen  Never done    DTaP/Tdap/Td vaccine (2 - Td or Tdap) 1973    Shingles Vaccine (1 of 2) Never done    Flu vaccine (1) 2021    COVID-19 Vaccine (3 - Booster for Sandoval Peter series) 2022    Annual Wellness Visit (AWV)  2022    Pneumococcal 65+ years Vaccine  Completed    Hepatitis A vaccine  Aged Out    Hib vaccine  Aged Out    Meningococcal (ACWY) vaccine  Aged Out           Social History     Socioeconomic History    Marital status:      Spouse name: Not on file    Number of children: Not on file    Years of education: Not on file    Highest education level: Not on file   Occupational History    Not on file   Tobacco Use    Smoking status: Former Smoker     Packs/day: 1.00     Years: 30.00     Pack years: 30.00     Types: Cigarettes     Quit date: 1995     Years since quittin.9    Smokeless tobacco: Never Used   Vaping Use    Vaping Use: Never used   Substance and Sexual Activity    Alcohol use: No     Alcohol/week: 0.0 standard drinks    Drug use: No    Sexual activity: Not Currently   Other Topics Concern    Not on file   Social History Narrative    Not on file     Social Determinants of Health     Financial Resource Strain: Low Risk     Difficulty of Paying Living Expenses: Not hard at all   Food Insecurity: No Food Insecurity    Worried About Running Out of Food in the Last Year: Never true    Rosy of Food in the Last Year: Never true   Transportation Needs:     Lack of Transportation (Medical): Not on file    Lack of Transportation (Non-Medical):  Not on file   Physical Activity:     Days of Exercise per Week: Not on file    Minutes of Exercise per Session: Not on file   Stress:     Feeling of Stress : Not on file   Social Connections:     Frequency of Communication with Friends and Family: Not on file    Frequency of Social Gatherings with Friends and Family: Not on file    Attends Jehovah's witness Services: Not on file    Active Member of Clubs or Organizations: Not on file    Attends Club or Organization Meetings: Not on file    Marital Status: Not on file   Intimate Partner Violence:     Fear of Current or Ex-Partner: Not on file    Emotionally Abused: Not on file    Physically Abused: Not on file    Sexually Abused: Not on file   Housing Stability:     Unable to Pay for Housing in the Last Year: Not on file    Number of [place] in the Last Year: Not on file    Unstable Housing in the Last Year: Not on file       Review of Systems   Constitutional: Negative. HENT: Negative. Respiratory: Negative. Cardiovascular: Negative. Gastrointestinal: Negative. Endocrine: Negative. Genitourinary: Negative. Neurological: Negative. Psychiatric/Behavioral: Negative. I have reviewed the patient's medical history in detail and updated the computerized patient record. OBJECTIVE    Vitals:    11/15/21 1407   BP: 138/60   Site: Left Upper Arm   Position: Sitting   Cuff Size: Large Adult   Pulse: 69   Temp: 96.8 °F (36 °C)   TempSrc: Temporal   SpO2: 97%   Weight: 169 lb (76.7 kg)   Height: 5' 10\" (1.778 m)       Physical Exam  Vitals reviewed. Constitutional:       Appearance: Normal appearance. HENT:      Head: Normocephalic and atraumatic. Neck:      Vascular: Carotid bruit (right ) present. Cardiovascular:      Rate and Rhythm: Normal rate and regular rhythm. Pulses: Normal pulses. Pulmonary:      Effort: Pulmonary effort is normal.      Breath sounds: Normal breath sounds. Abdominal:      Palpations: Abdomen is soft. Neurological:      Mental Status: He is alert and oriented to person, place, and time.    Psychiatric:         Mood and Affect: Mood normal.         Behavior: Behavior normal.         Thought Content: Thought content normal.         Judgment: Judgment normal.           ASSESSMENT/ PLAN    1. Type 2 diabetes mellitus without complication, without long-term current use of insulin (HCC)  - POCT glycosylated hemoglobin (Hb A1C)  - increased from 7.6 to 7.0   - needs diet improvements     2. Primary hypertension  - controlled    - continue meds     3. Hyperlipidemia, unspecified hyperlipidemia type  - not on statin , patient choice      4. Bilateral carotid artery stenosis  - Had check in 2020, sees vascular specialist   - right carotid bruit     5. Abdominal aortic aneurysm (AAA) without rupture ([facility] [address]  - US SCREENING FOR AAA; Future                Return in about 6 months (around 5/26/2022) for AWV, fasting labs.      Electronically signed by:  DARCI Shearer   11/15/21

## 2022-01-19 DIAGNOSIS — I73.9 PAD (PERIPHERAL ARTERY DISEASE) (HCC): ICD-10-CM

## 2022-01-19 DIAGNOSIS — I71.40 ABDOMINAL AORTIC ANEURYSM (AAA) WITHOUT RUPTURE: ICD-10-CM

## 2022-01-19 DIAGNOSIS — I72.3 ANEURYSM, COMMON ILIAC ARTERY (HCC): ICD-10-CM

## 2022-01-19 DIAGNOSIS — I72.4 POPLITEAL ANEURYSM (HCC): ICD-10-CM

## 2022-01-19 NOTE — TELEPHONE ENCOUNTER
Pt stopped in the office today requesting refills on his: METFORMIN 1000MG and CLOPIDOGREL 75MG    Thank you

## 2022-01-19 NOTE — TELEPHONE ENCOUNTER
Comments: none    Last Office Visit (last PCP visit):   11/15/2021    Next Visit Date:  Future Appointments   Date Time Provider Lotus Zazueta   5/31/2022  1:00  [facility], SUSANA [address]       **If hasn't been seen in over a year OR hasn't followed up according to last diabetes/ADHD visit, make appointment for patient before sending refill to provider.     Rx requested:  Requested Prescriptions     Pending Prescriptions Disp Refills    clopidogrel (PLAVIX) 75 MG tablet 90 tablet 0     Sig: Take 1 tablet by mouth once daily    metFORMIN (GLUCOPHAGE) 1000 MG tablet 180 tablet 0     Sig: Take 1 tablet by mouth twice daily

## 2022-01-21 RX ORDER — CLOPIDOGREL BISULFATE 75 MG/1
TABLET ORAL
Qty: 90 TABLET | Refills: 0 | Status: SHIPPED | OUTPATIENT
Start: 2022-01-21 | End: 2022-03-24 | Stop reason: ALTCHOICE

## 2022-03-24 ENCOUNTER — OFFICE VISIT (OUTPATIENT)
Dept: INTERNAL MEDICINE | Age: 77
End: 2022-03-24
Payer: MEDICARE

## 2022-03-24 VITALS
OXYGEN SATURATION: 97 % | HEIGHT: 70 IN | HEART RATE: 73 BPM | DIASTOLIC BLOOD PRESSURE: 72 MMHG | BODY MASS INDEX: 23.34 KG/M2 | SYSTOLIC BLOOD PRESSURE: 148 MMHG | WEIGHT: 163 LBS

## 2022-03-24 DIAGNOSIS — M35.3 POLYMYALGIA (HCC): ICD-10-CM

## 2022-03-24 DIAGNOSIS — E11.9 TYPE 2 DIABETES MELLITUS WITHOUT COMPLICATION, WITHOUT LONG-TERM CURRENT USE OF INSULIN (HCC): ICD-10-CM

## 2022-03-24 DIAGNOSIS — M25.50 POLYARTHRALGIA: Primary | ICD-10-CM

## 2022-03-24 LAB
C-REACTIVE PROTEIN: 9.2 MG/L (ref 0–5)
HBA1C MFR BLD: 7.6 %
SEDIMENTATION RATE, ERYTHROCYTE: 24 MM (ref 0–20)

## 2022-03-24 PROCEDURE — 3051F HG A1C>EQUAL 7.0%<8.0%: CPT | Performed by: PHYSICIAN ASSISTANT

## 2022-03-24 PROCEDURE — 99214 OFFICE O/P EST MOD 30 MIN: CPT | Performed by: PHYSICIAN ASSISTANT

## 2022-03-24 PROCEDURE — 83036 HEMOGLOBIN GLYCOSYLATED A1C: CPT | Performed by: PHYSICIAN ASSISTANT

## 2022-03-24 RX ORDER — INDOMETHACIN 50 MG/1
50 CAPSULE ORAL 3 TIMES DAILY
Qty: 21 CAPSULE | Refills: 0 | Status: SHIPPED | OUTPATIENT
Start: 2022-03-24 | End: 2022-04-09

## 2022-03-24 ASSESSMENT — PATIENT HEALTH QUESTIONNAIRE - PHQ9
4. FEELING TIRED OR HAVING LITTLE ENERGY: 0
SUM OF ALL RESPONSES TO PHQ QUESTIONS 1-9: 1
9. THOUGHTS THAT YOU WOULD BE BETTER OFF DEAD, OR OF HURTING YOURSELF: 0
SUM OF ALL RESPONSES TO PHQ QUESTIONS 1-9: 1
10. IF YOU CHECKED OFF ANY PROBLEMS, HOW DIFFICULT HAVE THESE PROBLEMS MADE IT FOR YOU TO DO YOUR WORK, TAKE CARE OF THINGS AT HOME, OR GET ALONG WITH OTHER PEOPLE: 0
SUM OF ALL RESPONSES TO PHQ9 QUESTIONS 1 & 2: 0
1. LITTLE INTEREST OR PLEASURE IN DOING THINGS: 0
7. TROUBLE CONCENTRATING ON THINGS, SUCH AS READING THE NEWSPAPER OR WATCHING TELEVISION: 0
8. MOVING OR SPEAKING SO SLOWLY THAT OTHER PEOPLE COULD HAVE NOTICED. OR THE OPPOSITE, BEING SO FIGETY OR RESTLESS THAT YOU HAVE BEEN MOVING AROUND A LOT MORE THAN USUAL: 0
5. POOR APPETITE OR OVEREATING: 0
2. FEELING DOWN, DEPRESSED OR HOPELESS: 0
6. FEELING BAD ABOUT YOURSELF - OR THAT YOU ARE A FAILURE OR HAVE LET YOURSELF OR YOUR FAMILY DOWN: 0
3. TROUBLE FALLING OR STAYING ASLEEP: 1
SUM OF ALL RESPONSES TO PHQ QUESTIONS 1-9: 1
SUM OF ALL RESPONSES TO PHQ QUESTIONS 1-9: 1

## 2022-03-24 NOTE — PROGRESS NOTES
Sami Issa (: ) is a 67 y.o. male, Established patient, here for evaluation of the following chief complaint(s):  Joint Pain (Says it happened after getting the covid shot)        ASSESSMENT/PLAN:  1. Polyarthralgia  - no h/o gout , does have a h/o PMR  - Sedimentation Rate; Future  - C-Reactive Protein; Future  - Rheumatoid Factor; Future  - indomethacin (INDOCIN) 50 MG capsule; Take 1 capsule by mouth 3 times daily  Dispense: 21 capsule; Refill: 0  -Prednisone is contraindicated due to diabetes  -We will start an NSAID, until labs come back      2. Type 2 diabetes mellitus without complication, without long-term current use of insulin (HCC)  - POCT glycosylated hemoglobin (Hb A1C)-stable today at 7.6        No follow-ups on file. 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 HENT: Negative. Respiratory: Negative. Musculoskeletal: Positive for arthralgias, joint swelling and myalgias. Neurological: Negative for weakness and numbness. Physical Exam  Constitutional:       Appearance: Normal appearance. Cardiovascular:      Rate and Rhythm: Normal rate and regular rhythm. Heart sounds: Normal heart sounds. Pulmonary:      Effort: Pulmonary effort is normal.   Musculoskeletal:         General: Normal range of motion. Neurological:      Mental Status: He is alert. Vitals:    22 1345 22 1350   BP: (!) 152/64 (!) 148/72   Site: Left Upper Arm Right Upper Arm   Position: Sitting Sitting   Cuff Size: Large Adult Large Adult   Pulse: 73    SpO2: 97%    Weight: 163 lb (73.9 kg)    Height: 5' 10\" (1.778 m)                  An electronic signature was used to authenticate this note.     --DARCI Ly

## 2022-03-25 LAB — RHEUMATOID FACTOR: 11.5 IU/ML

## 2022-03-31 ASSESSMENT — ENCOUNTER SYMPTOMS: RESPIRATORY NEGATIVE: 1

## 2022-04-04 DIAGNOSIS — M25.50 POLYARTHRALGIA: ICD-10-CM

## 2022-04-05 DIAGNOSIS — M25.50 POLYARTHRALGIA: ICD-10-CM

## 2022-04-05 RX ORDER — INDOMETHACIN 50 MG/1
50 CAPSULE ORAL 3 TIMES DAILY
Qty: 21 CAPSULE | Refills: 0 | OUTPATIENT
Start: 2022-04-05

## 2022-04-05 RX ORDER — INDOMETHACIN 25 MG/1
CAPSULE ORAL
Qty: 42 CAPSULE | Refills: 0 | OUTPATIENT
Start: 2022-04-05

## 2022-04-05 NOTE — TELEPHONE ENCOUNTER
Comments:      Last Office Visit (last PCP visit):   3/24/2022    Next Visit Date:  Future Appointments   Date Time Provider Lotus Zazueta   5/31/2022  1:00 PM [facility] SUSANA [address]   6/24/2022  1:00 PM [facility] [address]       **If hasn't been seen in over a year OR hasn't followed up according to last diabetes/ADHD visit, make appointment for patient before sending refill to provider.     Rx requested:  Requested Prescriptions     Pending Prescriptions Disp Refills    indomethacin (INDOCIN) 50 MG capsule 21 capsule 0     Sig: Take 1 capsule by mouth 3 times daily

## 2022-04-06 ENCOUNTER — PATIENT MESSAGE (OUTPATIENT)
Dept: INTERNAL MEDICINE | Age: 77
End: 2022-04-06

## 2022-04-06 DIAGNOSIS — M25.50 POLYARTHRALGIA: ICD-10-CM

## 2022-04-06 RX ORDER — INDOMETHACIN 50 MG/1
50 CAPSULE ORAL 3 TIMES DAILY
Qty: 21 CAPSULE | Refills: 0 | OUTPATIENT
Start: 2022-04-06

## 2022-04-06 NOTE — TELEPHONE ENCOUNTER
From: Brynn Craig  To: Yakov Sosa  Sent: 4/6/2022 12:37 PM EDT  Subject: Indomethacin    Hi dad was out of the Indomethacin, he's taking tylenol but he is in such pain. [facility] gave him a few as I spoke to a nurse the other day and she said she sent it thru but it hasn't come to [facility] yet. Do you know if there is anything else we can do?

## 2022-04-07 DIAGNOSIS — M25.50 POLYARTHRALGIA: ICD-10-CM

## 2022-04-07 RX ORDER — INDOMETHACIN 50 MG/1
50 CAPSULE ORAL 3 TIMES DAILY
Qty: 21 CAPSULE | Refills: 0 | OUTPATIENT
Start: 2022-04-07

## 2022-04-07 NOTE — TELEPHONE ENCOUNTER
Patients daughter called stated the refill request for INDOMETHACIN was still not refilled. She stated he is in a lot of pain. I noticed a few times it was a duplicate request but it's still not refilled.     Thank you

## 2022-04-09 RX ORDER — INDOMETHACIN 50 MG/1
50 CAPSULE ORAL 3 TIMES DAILY
Qty: 21 CAPSULE | Refills: 0 | Status: ON HOLD | OUTPATIENT
Start: 2022-04-09 | End: 2022-05-27 | Stop reason: HOSPADM

## 2022-04-11 ENCOUNTER — TELEMEDICINE (OUTPATIENT)
Dept: FAMILY MEDICINE CLINIC | Age: 77
End: 2022-04-11
Payer: MEDICARE

## 2022-04-11 DIAGNOSIS — M62.89 MUSCLE STIFFNESS: ICD-10-CM

## 2022-04-11 DIAGNOSIS — M25.50 ARTHRALGIA, UNSPECIFIED JOINT: ICD-10-CM

## 2022-04-11 DIAGNOSIS — M79.10 MYALGIA: Primary | ICD-10-CM

## 2022-04-11 PROCEDURE — 99213 OFFICE O/P EST LOW 20 MIN: CPT | Performed by: FAMILY MEDICINE

## 2022-04-11 RX ORDER — PREDNISONE 10 MG/1
10 TABLET ORAL DAILY
Qty: 15 TABLET | Refills: 1 | Status: SHIPPED | OUTPATIENT
Start: 2022-04-11 | End: 2022-04-18 | Stop reason: SDUPTHER

## 2022-04-11 ASSESSMENT — ENCOUNTER SYMPTOMS
WHEEZING: 0
CONSTIPATION: 0
DIARRHEA: 0
ABDOMINAL PAIN: 0
SORE THROAT: 0
RHINORRHEA: 0
COUGH: 0
SHORTNESS OF BREATH: 0

## 2022-04-11 NOTE — PROGRESS NOTES
1420 Nan Arevalo Virtual Visit  2500 [facility] 1840 [facility] PRIMARY CARE  [address] [address]  Dept: [phone]  Dept Fax: : [phone]     Due to COVID 23 outbreak, patient's office visit was converted to a virtual visit. Patient was contacted and agreed to proceed with a virtual visit via [email or web address] me  The risks and benefits of converting to a virtual visit were discussed in light of the current infectious disease epidemic. Patient also understood that insurance coverage and co-pays are up to their individual insurance plans. Chief Complaint  Chief Complaint   Patient presents with    Other     pt dx by Maryellen Given with polymyalgia rheumatic, would like second opinion    Discuss Medications     prednisone, indomethacin       HPI:  67 y. o.male who presents for the following:      Seen by PCP 3 weeks ago; woke with joint/muscle pains (hands, legs, arms); started on indomethacin with a little relief but still quite uncomfortable; diagnosed with PMR and were trying to avoid steroids; has stiffness; hard to fully close the hands; There is muscle pain and weakness; hard to walk and has been more unsteady; seems to ease up after 30min of activity      Review of Systems   Constitutional: Negative for chills and fever. HENT: Negative for congestion, rhinorrhea and sore throat. Respiratory: Negative for cough, shortness of breath and wheezing. Gastrointestinal: Negative for abdominal pain, constipation and diarrhea. Endocrine: Negative for polydipsia and polyuria. Genitourinary: Negative for dysuria, frequency and urgency. Musculoskeletal: Positive for arthralgias and myalgias. Neurological: Negative for syncope, light-headedness, numbness and headaches. Psychiatric/Behavioral: Negative for sleep disturbance. The patient is not nervous/anxious.         Past Medical History:   Diagnosis Date    AAA [facility]abdominal aortic aneurysm) ([facility]     Abdominal aortic aneurysm (AAA) without rupture ([facility]     Callus of foot 2013    Diastasis recti 2013    DM (diabetes mellitus) ([facility]     Gout     Gout     Hyperlipidemia     Hypertension     Neuropathy     secondary to DM    Neuropathy     PMR (polymyalgia rheumatica) ([facility] 2014    Polymyalgia rheumatica ([facility]      Past Surgical History:   Procedure Laterality Date    BACK SURGERY      CHOLECYSTECTOMY      HAND SURGERY Left 2015    dupuytren''s release with left thumb    INGUINAL HERNIA REPAIR      right side    VASCULAR SURGERY Left oct 18th 2019    lleft leg triple bypass      Social History     Socioeconomic History    Marital status:      Spouse name: Not on file    Number of children: Not on file    Years of education: Not on file    Highest education level: Not on file   Occupational History    Not on file   Tobacco Use    Smoking status: Former Smoker     Packs/day: 1.00     Years: 30.00     Pack years: 30.00     Types: Cigarettes     Quit date: 1995     Years since quittin.3    Smokeless tobacco: Never Used   Vaping Use    Vaping Use: Never used   Substance and Sexual Activity    Alcohol use: No     Alcohol/week: 0.0 standard drinks    Drug use: No    Sexual activity: Not Currently   Other Topics Concern    Not on file   Social History Narrative    Not on file     Social Determinants of Health     Financial Resource Strain: Low Risk     Difficulty of Paying Living Expenses: Not hard at all   Food Insecurity: No Food Insecurity    Worried About Running Out of Food in the Last Year: Never true    Rosy of Food in the Last Year: Never true   Transportation Needs:     Lack of Transportation (Medical): Not on file    Lack of Transportation (Non-Medical):  Not on file   Physical Activity:     Days of Exercise per Week: Not on file    Minutes of Exercise per Session: Not on file   Stress:     Feeling of Stress : Not on file   Social Connections:     Frequency of Communication with Friends and Family: Not on file    Frequency of Social Gatherings with Friends and Family: Not on file    Attends Oriental orthodox Services: Not on file    Active Member of Clubs or Organizations: Not on file    Attends Club or Organization Meetings: Not on file    Marital Status: Not on file   Intimate Partner Violence:     Fear of Current or Ex-Partner: Not on file    Emotionally Abused: Not on file    Physically Abused: Not on file    Sexually Abused: Not on file   Housing Stability:     Unable to Pay for Housing in the Last Year: Not on file    Number of [place] in the Last Year: Not on file    Unstable Housing in the Last Year: Not on file     No family history on file. No Known Allergies  Current Outpatient Medications   Medication Sig Dispense Refill    predniSONE (DELTASONE) 10 MG tablet Take 1 tablet by mouth daily for 15 days 15 tablet 1    indomethacin (INDOCIN) 50 MG capsule Take 1 capsule by mouth 3 times daily 21 capsule 0    Acetaminophen (TYLENOL) 325 MG CAPS Take by mouth      metFORMIN (GLUCOPHAGE) 1000 MG tablet Take 1 tablet by mouth twice daily 180 tablet 0    Misc Natural Products (GINKOGIN PO) Take by mouth daily With [place]      UNABLE TO FIND daily Nervive      Misc. Devices MISC Custom Orthotics 1 Device 0    blood glucose test strips (ONE TOUCH TEST STRIPS) strip Check blood sugar  twice daily As needed. 300 each 3    Lancets MISC Test bid 300 each 3     No current facility-administered medications for this visit. Physical exam:  Physical Exam  Constitutional:       General: He is not in acute distress. Appearance: Normal appearance. He is not ill-appearing. HENT:      Head: Normocephalic and atraumatic. Pulmonary:      Effort: Pulmonary effort is normal. No respiratory distress. Musculoskeletal:      Cervical back: Normal range of motion.    Neurological:      Mental Status: He is alert.         Assessment/Plan:  67 y.o. male here mainly for muscle/joint pains:  - recent labs with elevated inflammatory markers (ESR/CRP). Given his age and pattern of symptoms, PMR seems reasonable. A1c was 7.6 recently; I think 10mg initially tapered down to 5mg would be reasonable. Offered referral to rheumatology if seems to need chronic or long term steroids; should f/u with PCP in a month regarding the steroid and the blood sugars for med titration if needed. Diagnosis Orders   1. Myalgia  predniSONE (DELTASONE) 10 MG tablet   2. Muscle stiffness  predniSONE (DELTASONE) 10 MG tablet   3. Arthralgia, unspecified joint  predniSONE (DELTASONE) 10 MG tablet        Return if symptoms worsen or fail to improve. Mercedez Cuba MD       Pursuant to the emergency declaration under the [facility], [facility] waiver authority and the [employer] Act, this Virtual  Visit was conducted, with patient's consent, to reduce the patient's risk of exposure to COVID-19 and provide continuity of care for an established patient. Services were provided through a video synchronous discussion virtually to substitute for in-person clinic visit.

## 2022-04-18 DIAGNOSIS — I71.40 ABDOMINAL AORTIC ANEURYSM (AAA) WITHOUT RUPTURE: ICD-10-CM

## 2022-04-18 DIAGNOSIS — I72.4 POPLITEAL ANEURYSM (HCC): ICD-10-CM

## 2022-04-18 DIAGNOSIS — I72.3 ANEURYSM, COMMON ILIAC ARTERY (HCC): ICD-10-CM

## 2022-04-18 DIAGNOSIS — I73.9 PAD (PERIPHERAL ARTERY DISEASE) (HCC): ICD-10-CM

## 2022-04-21 RX ORDER — CLOPIDOGREL BISULFATE 75 MG/1
TABLET ORAL
Qty: 90 TABLET | Refills: 0 | Status: ON HOLD | OUTPATIENT
Start: 2022-04-21 | End: 2022-05-27 | Stop reason: HOSPADM

## 2022-05-03 DIAGNOSIS — M79.10 MYALGIA: ICD-10-CM

## 2022-05-03 DIAGNOSIS — M62.89 MUSCLE STIFFNESS: ICD-10-CM

## 2022-05-03 DIAGNOSIS — M25.50 ARTHRALGIA, UNSPECIFIED JOINT: ICD-10-CM

## 2022-05-03 RX ORDER — PREDNISONE 10 MG/1
10 TABLET ORAL DAILY
Qty: 20 TABLET | Refills: 0 | Status: SHIPPED | OUTPATIENT
Start: 2022-05-03 | End: 2022-06-24 | Stop reason: ALTCHOICE

## 2022-05-17 ENCOUNTER — OFFICE VISIT (OUTPATIENT)
Dept: INTERNAL MEDICINE | Age: 77
End: 2022-05-17
Payer: MEDICARE

## 2022-05-17 ENCOUNTER — APPOINTMENT (OUTPATIENT)
Dept: CT IMAGING | Age: 77
End: 2022-05-17
Payer: MEDICARE

## 2022-05-17 ENCOUNTER — NURSE TRIAGE (OUTPATIENT)
Dept: OTHER | Facility: CLINIC | Age: 77
End: 2022-05-17

## 2022-05-17 ENCOUNTER — APPOINTMENT (OUTPATIENT)
Dept: GENERAL RADIOLOGY | Age: 77
End: 2022-05-17
Payer: MEDICARE

## 2022-05-17 ENCOUNTER — HOSPITAL ENCOUNTER (EMERGENCY)
Age: 77
Discharge: HOME | End: 2022-05-17
Attending: EMERGENCY MEDICINE
Payer: MEDICARE

## 2022-05-17 VITALS
WEIGHT: 155 LBS | DIASTOLIC BLOOD PRESSURE: 85 MMHG | HEART RATE: 93 BPM | TEMPERATURE: 98.5 F | SYSTOLIC BLOOD PRESSURE: 160 MMHG | OXYGEN SATURATION: 97 % | HEIGHT: 70 IN | RESPIRATION RATE: 18 BRPM | BODY MASS INDEX: 22.19 KG/M2

## 2022-05-17 VITALS
HEIGHT: 70 IN | OXYGEN SATURATION: 98 % | WEIGHT: 155 LBS | BODY MASS INDEX: 22.19 KG/M2 | SYSTOLIC BLOOD PRESSURE: 132 MMHG | HEART RATE: 65 BPM | DIASTOLIC BLOOD PRESSURE: 86 MMHG

## 2022-05-17 DIAGNOSIS — E86.0 DEHYDRATION: ICD-10-CM

## 2022-05-17 DIAGNOSIS — M79.10 MYALGIA: ICD-10-CM

## 2022-05-17 DIAGNOSIS — R11.2 NAUSEA VOMITING AND DIARRHEA: ICD-10-CM

## 2022-05-17 DIAGNOSIS — R06.02 SHORTNESS OF BREATH: Primary | ICD-10-CM

## 2022-05-17 DIAGNOSIS — R53.1 GENERAL WEAKNESS: Primary | ICD-10-CM

## 2022-05-17 DIAGNOSIS — R19.7 NAUSEA VOMITING AND DIARRHEA: ICD-10-CM

## 2022-05-17 DIAGNOSIS — Z11.52 ENCOUNTER FOR SCREENING FOR COVID-19: ICD-10-CM

## 2022-05-17 DIAGNOSIS — R68.89 FLU-LIKE SYMPTOMS: ICD-10-CM

## 2022-05-17 LAB
ALBUMIN SERPL-MCNC: 4.7 G/DL (ref 3.5–4.6)
ALP BLD-CCNC: 71 U/L (ref 35–104)
ALT SERPL-CCNC: 18 U/L (ref 0–41)
ANION GAP SERPL CALCULATED.3IONS-SCNC: 17 MEQ/L (ref 9–15)
AST SERPL-CCNC: 14 U/L (ref 0–40)
BACTERIA: ABNORMAL /HPF
BASOPHILS ABSOLUTE: 0 K/UL (ref 0–0.1)
BASOPHILS RELATIVE PERCENT: 0.5 % (ref 0.2–1.2)
BILIRUB SERPL-MCNC: 0.4 MG/DL (ref 0.2–0.7)
BILIRUBIN URINE: NEGATIVE
BLOOD, URINE: ABNORMAL
BUN BLDV-MCNC: 25 MG/DL (ref 8–23)
CALCIUM SERPL-MCNC: 10.6 MG/DL (ref 8.5–9.9)
CHLORIDE BLD-SCNC: 96 MEQ/L (ref 95–107)
CLARITY: CLEAR
CO2: 23 MEQ/L (ref 20–31)
COLOR: YELLOW
CREAT SERPL-MCNC: 1.21 MG/DL (ref 0.7–1.2)
EOSINOPHILS ABSOLUTE: 0.4 K/UL (ref 0–0.5)
EOSINOPHILS RELATIVE PERCENT: 5.2 % (ref 0.8–7)
EPITHELIAL CELLS, UA: ABNORMAL /HPF
GFR AFRICAN AMERICAN: >60
GFR NON-AFRICAN AMERICAN: 58
GLOBULIN: 3.6 G/DL (ref 2.3–3.5)
GLUCOSE BLD-MCNC: 239 MG/DL (ref 70–99)
GLUCOSE URINE: 500 MG/DL
HCT VFR BLD CALC: 42.4 % (ref 42–52)
HEMOGLOBIN: 13.7 G/DL (ref 13.7–17.5)
HYALINE CASTS: ABNORMAL /LPF (ref 0–5)
IMMATURE GRANULOCYTES #: 0.1 K/UL
IMMATURE GRANULOCYTES %: 1.5 %
INFLUENZA A ANTIBODY: NORMAL
INFLUENZA B ANTIBODY: NORMAL
KETONES, URINE: ABNORMAL MG/DL
LEUKOCYTE ESTERASE, URINE: NEGATIVE
LYMPHOCYTES ABSOLUTE: 1.4 K/UL (ref 1.3–3.6)
LYMPHOCYTES RELATIVE PERCENT: 17.1 %
Lab: NORMAL
MCH RBC QN AUTO: 28.6 PG (ref 25.7–32.2)
MCHC RBC AUTO-ENTMCNC: 32.3 % (ref 32.3–36.5)
MCV RBC AUTO: 88.5 FL (ref 79–92.2)
MONOCYTES ABSOLUTE: 1 K/UL (ref 0.3–0.8)
MONOCYTES RELATIVE PERCENT: 12.6 % (ref 5.3–12.2)
MUCUS: PRESENT /LPF
NEUTROPHILS ABSOLUTE: 5 K/UL (ref 1.8–5.4)
NEUTROPHILS RELATIVE PERCENT: 63.1 % (ref 34–67.9)
NITRITE, URINE: NEGATIVE
PDW BLD-RTO: 14.5 % (ref 11.6–14.4)
PERFORMING INSTRUMENT: NORMAL
PH UA: 5.5 (ref 5–9)
PLATELET # BLD: 286 K/UL (ref 163–337)
POTASSIUM SERPL-SCNC: 4.2 MEQ/L (ref 3.4–4.9)
PROTEIN UA: 30 MG/DL
QC PASS/FAIL: NORMAL
RBC # BLD: 4.79 M/UL (ref 4.63–6.08)
RBC UA: ABNORMAL /HPF (ref 0–2)
SARS-COV-2, POC: NORMAL
SODIUM BLD-SCNC: 136 MEQ/L (ref 135–144)
SPECIFIC GRAVITY UA: 1.02 (ref 1–1.03)
TOTAL CK: 38 U/L (ref 0–190)
TOTAL PROTEIN: 8.3 G/DL (ref 6.3–8)
TROPONIN: <0.01 NG/ML (ref 0–0.01)
URINE REFLEX TO CULTURE: ABNORMAL
UROBILINOGEN, URINE: 0.2 E.U./DL
WBC # BLD: 8 K/UL (ref 4.2–9)
WBC UA: ABNORMAL /HPF (ref 0–5)

## 2022-05-17 PROCEDURE — 99213 OFFICE O/P EST LOW 20 MIN: CPT | Performed by: NURSE PRACTITIONER

## 2022-05-17 PROCEDURE — 71045 X-RAY EXAM CHEST 1 VIEW: CPT

## 2022-05-17 PROCEDURE — 87804 INFLUENZA ASSAY W/OPTIC: CPT | Performed by: NURSE PRACTITIONER

## 2022-05-17 PROCEDURE — 70450 CT HEAD/BRAIN W/O DYE: CPT

## 2022-05-17 PROCEDURE — 99285 EMERGENCY DEPT VISIT HI MDM: CPT

## 2022-05-17 PROCEDURE — 81001 URINALYSIS AUTO W/SCOPE: CPT

## 2022-05-17 PROCEDURE — 2580000003 HC RX 258: Performed by: EMERGENCY MEDICINE

## 2022-05-17 PROCEDURE — 80053 COMPREHEN METABOLIC PANEL: CPT

## 2022-05-17 PROCEDURE — 96361 HYDRATE IV INFUSION ADD-ON: CPT

## 2022-05-17 PROCEDURE — 85025 COMPLETE CBC W/AUTO DIFF WBC: CPT

## 2022-05-17 PROCEDURE — 82550 ASSAY OF CK (CPK): CPT

## 2022-05-17 PROCEDURE — 96374 THER/PROPH/DIAG INJ IV PUSH: CPT

## 2022-05-17 PROCEDURE — 6360000002 HC RX W HCPCS: Performed by: EMERGENCY MEDICINE

## 2022-05-17 PROCEDURE — 93005 ELECTROCARDIOGRAM TRACING: CPT

## 2022-05-17 PROCEDURE — 36415 COLL VENOUS BLD VENIPUNCTURE: CPT

## 2022-05-17 PROCEDURE — 87426 SARSCOV CORONAVIRUS AG IA: CPT | Performed by: NURSE PRACTITIONER

## 2022-05-17 PROCEDURE — 84484 ASSAY OF TROPONIN QUANT: CPT

## 2022-05-17 RX ORDER — 0.9 % SODIUM CHLORIDE 0.9 %
500 INTRAVENOUS SOLUTION INTRAVENOUS ONCE
Status: COMPLETED | OUTPATIENT
Start: 2022-05-17 | End: 2022-05-17

## 2022-05-17 RX ORDER — ONDANSETRON 4 MG/1
4 TABLET, ORALLY DISINTEGRATING ORAL
Qty: 8 TABLET | Refills: 0 | Status: SHIPPED | OUTPATIENT
Start: 2022-05-17

## 2022-05-17 RX ORDER — SODIUM CHLORIDE 0.9 % (FLUSH) 0.9 %
3 SYRINGE (ML) INJECTION EVERY 8 HOURS
Status: DISCONTINUED | OUTPATIENT
Start: 2022-05-17 | End: 2022-05-17 | Stop reason: HOSPADM

## 2022-05-17 RX ORDER — ONDANSETRON 2 MG/ML
4 INJECTION INTRAMUSCULAR; INTRAVENOUS ONCE
Status: COMPLETED | OUTPATIENT
Start: 2022-05-17 | End: 2022-05-17

## 2022-05-17 RX ADMIN — Medication 3 ML: at 15:18

## 2022-05-17 RX ADMIN — SODIUM CHLORIDE 500 ML: 9 INJECTION, SOLUTION INTRAVENOUS at 16:05

## 2022-05-17 RX ADMIN — ONDANSETRON 4 MG: 2 INJECTION INTRAMUSCULAR; INTRAVENOUS at 15:17

## 2022-05-17 RX ADMIN — SODIUM CHLORIDE 500 ML: 9 INJECTION, SOLUTION INTRAVENOUS at 15:17

## 2022-05-17 ASSESSMENT — ENCOUNTER SYMPTOMS
COLOR CHANGE: 0
SINUS PRESSURE: 0
VOMITING: 1
EYE DISCHARGE: 0
SHORTNESS OF BREATH: 1
COUGH: 0
SORE THROAT: 0
SINUS PAIN: 0
DIARRHEA: 1
ABDOMINAL PAIN: 0
EYE REDNESS: 0
NAUSEA: 1
WHEEZING: 0
ABDOMINAL PAIN: 1
SINUS PAIN: 0
BACK PAIN: 0
DIARRHEA: 0
SHORTNESS OF BREATH: 1
COUGH: 1
NAUSEA: 0
RHINORRHEA: 0
VOMITING: 0

## 2022-05-17 NOTE — PROGRESS NOTES
Question:   Symptomatic for COVID-19 as defined by CDC? Answer:   Yes     Order Specific Question:   Date of Symptom Onset     Answer:   5/14/2022     Order Specific Question:   Hospitalized for COVID-19? Answer:   No     Order Specific Question:   Admitted to ICU for COVID-19? Answer:   No     Order Specific Question:   Employed in healthcare setting? Answer:   No     Order Specific Question:   Resident in a congregate (group) care setting? Answer:   No     Order Specific Question:   Pregnant: Answer:   No     Order Specific Question:   Previously tested for COVID-19? Answer:   Yes     Flu and covid negative. Patient is unable to provide a complete history. Due to patient's concern for worsening weakness and SOB he was advised to present to the nearest ER for additional evaluation and treatment, possible dehydration. I have reviewed and updated the electronic medical record. Return in about 3 days (around 5/20/2022), or if symptoms worsen or fail to improve, for follow up with PCP.     STEPHANIA Wallace - NP

## 2022-05-17 NOTE — ED PROVIDER NOTES
2000 [facility] ED  EMERGENCY DEPARTMENT ENCOUNTER      Pt Name: Bladimir Avalos  MRN: [id number]  [place] [id number]  Date of evaluation: 5/17/2022  Provider: Betsy Ramos DO    CHIEF COMPLAINT       Chief Complaint   Patient presents with    Shortness of Breath     Chief complaint: Weakness  History of chief complaint: This 74-year-old gentleman presents the emergency department complaining of generalized weakness and muscle aching and joint aching patient states he is not sure how long its been going on. Patient states he did see his doctor for it about a month ago and was given steroids with improvement. Patient states he has a bad memory but thinks it was the last couple days that its been acting up more. Patient states he has bad arthritis. Patient does report recent nasal congestion and intermittent coughing states he does feel short of breath at times particularly with wearing the mask. Patient denies any chest pain or palpitations no abdominal pain, no nausea vomiting diarrhea. Patient reports general headache intermittent, states he has been having trouble with dizziness at times but that has been ongoing for the last couple months. Patient states he does get tremors in his hand particularly on the left side but that has been ongoing for a while. Patient states he had a doctor's appointment today at 1:00 states he saw one of the other girls in the office covering for Dr. She did swabs for COVID and flu and sent him to the emergency department, he is not sure why. Patient states he has been eating and drinking, has not noticed any fever. Patient denies any dysuria hematuria frequency or urgency. History is significantly limited secondary to the patient's condition    Review of the note from the office visit today revealed patient presenting for abdominal pain nausea vomiting diarrhea.   I did go back into the room and question the patient again, regarding any episodes of vomiting diarrhea or abdominal pain. Patient states \"oh yes\" he did throw up this morning states he was trying to get to the waste ,can but made a mess. And thinks he did have some diarrhea. Patient denies any abdominal pain states his stomach \"might have\" felt a little sore when he was throwing up. Nursing Notes were reviewed. REVIEW OF SYSTEMS    (2-9 systems for level 4, 10 or more for level 5)     Review of Systems   Constitutional: Positive for fatigue. Negative for chills and fever. HENT: Positive for congestion. Negative for sinus pain. Eyes: Negative for discharge and redness. Respiratory: Positive for cough and shortness of breath. Cardiovascular: Negative for chest pain, palpitations and leg swelling. Gastrointestinal: Negative for abdominal pain, diarrhea, nausea and vomiting. Genitourinary: Negative for difficulty urinating, dysuria, flank pain and frequency. Musculoskeletal: Negative for back pain and neck pain. Skin: Negative for color change and rash. Neurological: Positive for dizziness and headaches. Negative for weakness and numbness. Hematological: Negative for adenopathy. Except as noted above the remainder of the review of systems was reviewed and negative.        PAST MEDICAL HISTORY     Past Medical History:   Diagnosis Date    [facility]     Abdominal aortic aneurysm (AAA) without rupture ([facility])     Callus of foot 9/26/2013    Diastasis recti 9/18/2013    DM (diabetes mellitus) ([address]     Gout     Gout     Hyperlipidemia     Hypertension     Neuropathy     secondary to DM    Neuropathy     PMR (polymyalgia rheumatica) ([facility] 5/19/2014    Polymyalgia rheumatica ([facility]          SURGICAL HISTORY       Past Surgical History:   Procedure Laterality Date    BACK SURGERY      CHOLECYSTECTOMY      HAND SURGERY Left 11/18/2015    dupuytren''s release with left thumb    INGUINAL HERNIA REPAIR      right side    VASCULAR SURGERY Left oct 18th 2019 lleft leg triple bypass          CURRENT MEDICATIONS       Previous Medications    ACETAMINOPHEN (TYLENOL) 325 MG CAPS    Take by mouth    BLOOD GLUCOSE TEST STRIPS (ONE TOUCH TEST STRIPS) STRIP    Check blood sugar  twice daily As needed. CLOPIDOGREL (PLAVIX) 75 MG TABLET    Take 1 tablet by mouth once daily    INDOMETHACIN (INDOCIN) 50 MG CAPSULE    Take 1 capsule by mouth 3 times daily    LANCETS MISC    Test bid    METFORMIN (GLUCOPHAGE) 1000 MG TABLET    Take 1 tablet by mouth twice daily    MISC NATURAL PRODUCTS (GINKOGIN PO)    Take by mouth daily With [place]    MISC. DEVICES MISC    Custom Orthotics    PREDNISONE (DELTASONE) 10 MG TABLET    Take 1 tablet by mouth daily    UNABLE TO FIND    daily Nervive       ALLERGIES     Patient has no known allergies. FAMILY HISTORY     History reviewed. No pertinent family history. SOCIAL HISTORY       Social History     Socioeconomic History    Marital status:      Spouse name: None    Number of children: None    Years of education: None    Highest education level: None   Occupational History    None   Tobacco Use    Smoking status: Former Smoker     Packs/day: 1.00     Years: 30.00     Pack years: 30.00     Types: Cigarettes     Quit date: 1995     Years since quittin.4    Smokeless tobacco: Never Used   Vaping Use    Vaping Use: Never used   Substance and Sexual Activity    Alcohol use: No     Alcohol/week: 0.0 standard drinks    Drug use: No    Sexual activity: Not Currently   Other Topics Concern    None   Social History Narrative    None     Social Determinants of Health     Financial Resource Strain: Low Risk     Difficulty of Paying Living Expenses: Not hard at all   Food Insecurity: No Food Insecurity    Worried About Running Out of Food in the Last Year: Never true    Rosy of Food in the Last Year: Never true   Transportation Needs:     Lack of Transportation (Medical):  Not on file    Lack of Transportation (Non-Medical): Not on file   Physical Activity:     Days of Exercise per Week: Not on file    Minutes of Exercise per Session: Not on file   Stress:     Feeling of Stress : Not on file   Social Connections:     Frequency of Communication with Friends and Family: Not on file    Frequency of Social Gatherings with Friends and Family: Not on file    Attends [employer] Services: Not on file    Active Member of [address] [place] [address] or Organizations: Not on file    Attends Club or Organization Meetings: Not on file    Marital Status: Not on file   Intimate Partner Violence:     Fear of Current or Ex-Partner: Not on file    Emotionally Abused: Not on file    Physically Abused: Not on file    Sexually Abused: Not on file   Housing Stability:     Unable to Pay for Housing in the Last Year: Not on file    Number of [place] in the Last Year: Not on file    Unstable Housing in the Last Year: Not on file         PHYSICAL EXAM    (up to 7 for level 4, 8 or more for level 5)     ED Triage Vitals   BP Temp Temp Source Pulse Resp SpO2 Height Weight   05/17/22 1427 05/17/22 1416 05/17/22 1416 05/17/22 1416 05/17/22 1416 05/17/22 1416 05/17/22 1416 05/17/22 1416   (!) 155/86 98.5 °F (36.9 °C) Temporal 98 16 95 % 5' 10\" (1.778 m) 155 lb (70.3 kg)       Physical Exam   General appearance: Patient is awake alert interactive appropriate nontoxic in no acute distress, but is a very poor historian needs redirected with questioning and uncertain on timelines exactly why he is here.   Head is atraumatic normocephalic  Eyes pupils are equal and reactive sclera white conjunctive are pink  Oral pharyngeal cavity is pink with good moisture, no exudates or ulcerations no asymmetry, the airway is widely patent  Neck: Supple no meningeal signs no adenopathy no JVD  Heart: Regular rate and rhythm no gross murmurs rubs or clicks  Lungs: Breath sounds are clear with good air movement throughout no active wheezes rales or rhonchi no respiratory distress  Abdomen: Soft nontender with good bowel sounds no rebound rigidity or guarding, Hodgson sign or McBurney's point tenderness, no firm or pulsatile masses, no gross distention, femoral pulses full and symmetric  Back: Nontender to palpation no costovertebral angle tenderness  Skin: Warm and dry without rashes  Lower extremities: No edema or calf tenderness or asymmetry. Neurologic: Patient is awake alert oriented to person and place not to date answering simple questions but a very poor historian needs redirected with questions. Pupils are equal and reactive extraocular muscles are intact facial symmetry is intact tongue is midline strength testing is symmetric in both the upper and lower extremities sensation is intact in all 4 extremities there is no pronator drift. There is a tremor noted in the left hand during examination. DIAGNOSTIC RESULTS     EKG: All EKG's are interpreted by the Emergency Department Physician who either signs or Co-signs this chart in the absence of a cardiologist.    EKG interpreted by ED physician indication weakness: Normal sinus rhythm with PACs and PVCs no significant ST-T change no acute infarction pattern    RADIOLOGY:   Non-plain film images such as CT, Ultrasound and MRI are read by the radiologist. Plain radiographic images are visualized and preliminarily interpreted by the emergency physician with the below findings:    Chest x-ray 1 view interpreted by ED physician indication weakness:    Interpretation per the Radiologist below, if available at the time of this note:    CT HEAD WO CONTRAST   Final Result   Impression:      Mild cerebral atrophy. Chronic ischemic white matter disease. Remote left basal ganglia lacunar infarct. No acute findings         All CT scans at this facility use dose modulation, iterative reconstruction, and/or weight based dosing when appropriate to reduce radiation dose to as low as reasonably achievable.       XR CHEST PORTABLE   Final Result   NO ACUTE CARDIOPULMONARY DISEASE. LABS:  Labs Reviewed   COMPREHENSIVE METABOLIC PANEL - Abnormal; Notable for the following components:       Result Value    Anion Gap 17 (*)     Glucose 239 (*)     BUN 25 (*)     CREATININE 1.21 (*)     GFR Non- 58.0 (*)     Calcium 10.6 (*)     Total Protein 8.3 (*)     Albumin 4.7 (*)     Globulin 3.6 (*)     All other components within normal limits   CBC WITH AUTO DIFFERENTIAL - Abnormal; Notable for the following components:    RDW 14.5 (*)     Monocytes % 12.6 (*)     Monocytes Absolute 1.0 (*)     All other components within normal limits   URINALYSIS WITH REFLEX TO CULTURE - Abnormal; Notable for the following components:    Glucose, Ur 500 (*)     Protein, UA 30 (*)     All other components within normal limits   MICROSCOPIC URINALYSIS - Abnormal; Notable for the following components:    Bacteria, UA RARE (*)     All other components within normal limits   TROPONIN   CK   Laboratory review: CBC is within normal limits, BMP reveals hyperglycemia with a glucose of 239 mild dehydration with an elevated BUN of 25 creatinine of 1.21 which is slightly increased from previous. CK total is normal at 38 troponin is less than 0.01 urinalysis reveals trace ketones and glucose. Patient had COVID and influenza screening earlier today which were reportedly negative    All other labs were within normal range or not returned as of this dictation. EMERGENCY DEPARTMENT COURSE and DIFFERENTIAL DIAGNOSIS/MDM:   Vitals:    Vitals:    05/17/22 1416 05/17/22 1427   BP:  (!) 155/86   Pulse: 98    Resp: 16    Temp: 98.5 °F (36.9 °C)    TempSrc: Temporal    SpO2: 95%    Weight: 155 lb (70.3 kg)    Height: 5' 10\" (1.778 m)        Treatment and course: Patient was placed on a cardiac monitor with continuous pulse ox monitoring and IV was established normal saline 500 mL IV fluid bolus was given.   Zofran 4 mg IV was given empirically with a history of vomiting this morning. Repeat normal saline 500 mL IV bolus given with findings of dehydration. Patient well-appearing, no acute complaint here ambulatory in emergency without difficulty vital signs are stable. FINAL IMPRESSION      1. General weakness    2. Nausea vomiting and diarrhea    3. Myalgia    4. Dehydration          DISPOSITION/PLAN   DISPOSITION    Patient discharged home with family advised rest increase fluids home-going prescription for Zofran was given if needed for nausea. Patient advised to monitor closely return if any change or worsening any chest pain abdominal pain persistent vomiting fever not keeping down fluids difficulty breathing. Patient to follow-up with primary physician for repeat assessment in the next 2 days    PATIENT REFERRED TO:  DARCI Alvarez  [address] [place] [id number] [phone]    In 2 days        DISCHARGE MEDICATIONS:  New Prescriptions    ONDANSETRON (ZOFRAN ODT) 4 MG DISINTEGRATING TABLET    Take 1 tablet by mouth every 4-6 hours as needed for Nausea or Vomiting     Controlled Substances Monitoring:     No flowsheet data found.     (Please note that portions of this note were completed with a voice recognition program.  Efforts were made to edit the dictations but occasionally words are mis-transcribed.)    Sami Rosales DO (electronically signed)  Attending Emergency Physician            Sami Rosales DO  05/17/22 1525

## 2022-05-17 NOTE — ED NOTES
Patient ambulated in ED with cane. Pt was not SOB.      Elizabeth Goodrich, [facility]  05/17/22 3869

## 2022-05-18 LAB
EKG ATRIAL RATE: 98 BPM
EKG P AXIS: 55 DEGREES
EKG P-R INTERVAL: 136 MS
EKG Q-T INTERVAL: 326 MS
EKG QRS DURATION: 78 MS
EKG QTC CALCULATION (BAZETT): 416 MS
EKG R AXIS: -10 DEGREES
EKG T AXIS: 68 DEGREES
EKG VENTRICULAR RATE: 98 BPM

## 2022-05-18 PROCEDURE — 93010 ELECTROCARDIOGRAM REPORT: CPT | Performed by: INTERNAL MEDICINE

## 2022-05-24 ENCOUNTER — HOSPITAL ENCOUNTER (INPATIENT)
Age: 77
LOS: 1 days | Discharge: HOME | DRG: 312 | End: 2022-05-27
Attending: INTERNAL MEDICINE | Admitting: INTERNAL MEDICINE
Payer: MEDICARE

## 2022-05-24 ENCOUNTER — APPOINTMENT (OUTPATIENT)
Dept: CT IMAGING | Age: 77
DRG: 312 | End: 2022-05-24
Payer: MEDICARE

## 2022-05-24 DIAGNOSIS — R42 DIZZINESS: ICD-10-CM

## 2022-05-24 DIAGNOSIS — G45.9 TIA (TRANSIENT ISCHEMIC ATTACK): Primary | ICD-10-CM
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
## 2022-05-24 PROBLEM — R55 SYNCOPE AND COLLAPSE: Status: ACTIVE | Noted: 2022-05-24

## 2022-05-24 LAB
ALBUMIN SERPL-MCNC: 3.8 G/DL (ref 3.5–4.6)
ALP BLD-CCNC: 57 U/L (ref 35–104)
ALT SERPL-CCNC: 24 U/L (ref 0–41)
AMPHETAMINE SCREEN, URINE: NORMAL
ANION GAP SERPL CALCULATED.3IONS-SCNC: 18 MEQ/L (ref 9–15)
ANISOCYTOSIS: ABNORMAL
APTT: 20 SEC (ref 24.4–36.8)
AST SERPL-CCNC: 17 U/L (ref 0–40)
BACTERIA: NEGATIVE /HPF
BANDED NEUTROPHILS RELATIVE PERCENT: 1 %
BARBITURATE SCREEN URINE: NORMAL
BASE EXCESS ARTERIAL: -4 (ref -3–3)
BASOPHILS ABSOLUTE: 0.1 K/UL (ref 0–0.2)
BASOPHILS RELATIVE PERCENT: 1 %
BENZODIAZEPINE SCREEN, URINE: NORMAL
BILIRUB SERPL-MCNC: 0.4 MG/DL (ref 0.2–0.7)
BILIRUBIN URINE: NEGATIVE
BLOOD, URINE: NEGATIVE
BUN BLDV-MCNC: 24 MG/DL (ref 8–23)
CALCIUM IONIZED: 1.22 MMOL/L (ref 1.12–1.32)
CALCIUM SERPL-MCNC: 9.5 MG/DL (ref 8.5–9.9)
CANNABINOID SCREEN URINE: NORMAL
CHLORIDE BLD-SCNC: 99 MEQ/L (ref 95–107)
CLARITY: CLEAR
CO2: 19 MEQ/L (ref 20–31)
COCAINE METABOLITE SCREEN URINE: NORMAL
COLOR: YELLOW
CREAT SERPL-MCNC: 1.22 MG/DL (ref 0.7–1.2)
EOSINOPHILS ABSOLUTE: 0.4 K/UL (ref 0–0.7)
EOSINOPHILS RELATIVE PERCENT: 4 %
EPITHELIAL CELLS, UA: ABNORMAL /HPF (ref 0–5)
ETHANOL PERCENT: NORMAL G/DL
ETHANOL: <10 MG/DL (ref 0–0.08)
GFR AFRICAN AMERICAN: >60
GFR AFRICAN AMERICAN: >60
GFR NON-AFRICAN AMERICAN: 57.4
GFR NON-AFRICAN AMERICAN: >60
GLOBULIN: 3.2 G/DL (ref 2.3–3.5)
GLUCOSE BLD-MCNC: 190 MG/DL (ref 70–99)
GLUCOSE BLD-MCNC: 215 MG/DL (ref 70–99)
GLUCOSE URINE: NEGATIVE MG/DL
HCO3 ARTERIAL: 19.4 MMOL/L (ref 21–29)
HCT VFR BLD CALC: 35.3 % (ref 42–52)
HEMOGLOBIN: 11.4 GM/DL (ref 13.5–17.5)
HEMOGLOBIN: 12.1 G/DL (ref 14–18)
HYALINE CASTS: ABNORMAL /HPF (ref 0–5)
INFLUENZA A BY PCR: NEGATIVE
INFLUENZA B BY PCR: NEGATIVE
INR BLD: 1.1
KETONES, URINE: 15 MG/DL
LACTATE: 1.15 MMOL/L (ref 0.4–2)
LEUKOCYTE ESTERASE, URINE: NEGATIVE
LV EF: 60 %
LVEF MODALITY: NORMAL
LYMPHOCYTES ABSOLUTE: 0.6 K/UL (ref 1–4.8)
LYMPHOCYTES RELATIVE PERCENT: 6 %
Lab: NORMAL
MCH RBC QN AUTO: 29.1 PG (ref 27–31.3)
MCHC RBC AUTO-ENTMCNC: 34.3 % (ref 33–37)
MCV RBC AUTO: 85 FL (ref 80–100)
METAMYELOCYTES RELATIVE PERCENT: 1 %
METHADONE SCREEN, URINE: NORMAL
MICROCYTES: ABNORMAL
MONOCYTES ABSOLUTE: 0.8 K/UL (ref 0.2–0.8)
MONOCYTES RELATIVE PERCENT: 7.6 %
MYELOCYTE PERCENT: 3 %
NEUTROPHILS ABSOLUTE: 8.6 K/UL (ref 1.4–6.5)
NEUTROPHILS RELATIVE PERCENT: 77 %
NITRITE, URINE: NEGATIVE
O2 SAT, ARTERIAL: 96 % (ref 93–100)
OPIATE SCREEN URINE: NORMAL
OXYCODONE URINE: NORMAL
PCO2 ARTERIAL: 28 MM HG (ref 35–45)
PDW BLD-RTO: 15.6 % (ref 11.5–14.5)
PERFORMED ON: ABNORMAL
PH ARTERIAL: 7.46 (ref 7.35–7.45)
PH UA: 5 (ref 5–9)
PHENCYCLIDINE SCREEN URINE: NORMAL
PLATELET # BLD: 253 K/UL (ref 130–400)
PLATELET SLIDE REVIEW: NORMAL
PO2 ARTERIAL: 74 MM HG (ref 75–108)
POC CHLORIDE: 103 MEQ/L (ref 99–110)
POC CREATININE: 1.1 MG/DL (ref 0.8–1.3)
POC FIO2: 21
POC HEMATOCRIT: 34 % (ref 41–53)
POC POTASSIUM: 3.9 MEQ/L (ref 3.5–5.1)
POC SAMPLE TYPE: ABNORMAL
POC SODIUM: 135 MEQ/L (ref 136–145)
POTASSIUM SERPL-SCNC: 4.2 MEQ/L (ref 3.4–4.9)
PROCALCITONIN: 0.11 NG/ML (ref 0–0.15)
PROPOXYPHENE SCREEN: NORMAL
PROTEIN UA: 30 MG/DL
PROTHROMBIN TIME: 13.7 SEC (ref 12.3–14.9)
RBC # BLD: 4.15 M/UL (ref 4.7–6.1)
RBC UA: ABNORMAL /HPF (ref 0–5)
SARS-COV-2, NAAT: NOT DETECTED
SODIUM BLD-SCNC: 136 MEQ/L (ref 135–144)
SPECIFIC GRAVITY UA: 1.02 (ref 1–1.03)
TCO2 ARTERIAL: 20 MMOL/L (ref 21–32)
TOTAL CK: 31 U/L (ref 0–190)
TOTAL PROTEIN: 7 G/DL (ref 6.3–8)
TROPONIN: <0.01 NG/ML (ref 0–0.01)
URINE REFLEX TO CULTURE: ABNORMAL
UROBILINOGEN, URINE: 0.2 E.U./DL
WBC # BLD: 10.5 K/UL (ref 4.8–10.8)
WBC UA: ABNORMAL /HPF (ref 0–5)

## 2022-05-24 PROCEDURE — 82550 ASSAY OF CK (CPK): CPT

## 2022-05-24 PROCEDURE — 85014 HEMATOCRIT: CPT

## 2022-05-24 PROCEDURE — 81001 URINALYSIS AUTO W/SCOPE: CPT

## 2022-05-24 PROCEDURE — 85730 THROMBOPLASTIN TIME PARTIAL: CPT

## 2022-05-24 PROCEDURE — 84145 PROCALCITONIN (PCT): CPT

## 2022-05-24 PROCEDURE — 82330 ASSAY OF CALCIUM: CPT

## 2022-05-24 PROCEDURE — G0378 HOSPITAL OBSERVATION PER HR: HCPCS

## 2022-05-24 PROCEDURE — 87502 INFLUENZA DNA AMP PROBE: CPT

## 2022-05-24 PROCEDURE — 96374 THER/PROPH/DIAG INJ IV PUSH: CPT

## 2022-05-24 PROCEDURE — 93306 TTE W/DOPPLER COMPLETE: CPT

## 2022-05-24 PROCEDURE — 99285 EMERGENCY DEPT VISIT HI MDM: CPT

## 2022-05-24 PROCEDURE — 93005 ELECTROCARDIOGRAM TRACING: CPT | Performed by: PHYSICIAN ASSISTANT

## 2022-05-24 PROCEDURE — 80053 COMPREHEN METABOLIC PANEL: CPT

## 2022-05-24 PROCEDURE — 80307 DRUG TEST PRSMV CHEM ANLYZR: CPT

## 2022-05-24 PROCEDURE — 83605 ASSAY OF LACTIC ACID: CPT

## 2022-05-24 PROCEDURE — 82435 ASSAY OF BLOOD CHLORIDE: CPT

## 2022-05-24 PROCEDURE — 85610 PROTHROMBIN TIME: CPT

## 2022-05-24 PROCEDURE — 82803 BLOOD GASES ANY COMBINATION: CPT

## 2022-05-24 PROCEDURE — 6370000000 HC RX 637 (ALT 250 FOR IP): Performed by: PHYSICIAN ASSISTANT

## 2022-05-24 PROCEDURE — 6830039000 HC L3 TRAUMA ALERT

## 2022-05-24 PROCEDURE — 71250 CT THORAX DX C-: CPT

## 2022-05-24 PROCEDURE — 85025 COMPLETE CBC W/AUTO DIFF WBC: CPT

## 2022-05-24 PROCEDURE — 6360000002 HC RX W HCPCS: Performed by: PHYSICIAN ASSISTANT

## 2022-05-24 PROCEDURE — 36600 WITHDRAWAL OF ARTERIAL BLOOD: CPT

## 2022-05-24 PROCEDURE — 36415 COLL VENOUS BLD VENIPUNCTURE: CPT

## 2022-05-24 PROCEDURE — 82077 ASSAY SPEC XCP UR&BREATH IA: CPT

## 2022-05-24 PROCEDURE — 84295 ASSAY OF SERUM SODIUM: CPT

## 2022-05-24 PROCEDURE — 82565 ASSAY OF CREATININE: CPT

## 2022-05-24 PROCEDURE — 70450 CT HEAD/BRAIN W/O DYE: CPT

## 2022-05-24 PROCEDURE — 84132 ASSAY OF SERUM POTASSIUM: CPT

## 2022-05-24 PROCEDURE — 84484 ASSAY OF TROPONIN QUANT: CPT

## 2022-05-24 PROCEDURE — 87635 SARS-COV-2 COVID-19 AMP PRB: CPT

## 2022-05-24 RX ORDER — ASPIRIN 81 MG/1
324 TABLET, CHEWABLE ORAL ONCE
Status: COMPLETED | OUTPATIENT
Start: 2022-05-24 | End: 2022-05-24

## 2022-05-24 RX ORDER — ENOXAPARIN SODIUM 100 MG/ML
40 INJECTION SUBCUTANEOUS DAILY
Status: DISCONTINUED | OUTPATIENT
Start: 2022-05-24 | End: 2022-05-27 | Stop reason: HOSPADM

## 2022-05-24 RX ORDER — ACETAMINOPHEN 650 MG/1
650 SUPPOSITORY RECTAL EVERY 6 HOURS PRN
Status: DISCONTINUED | OUTPATIENT
Start: 2022-05-24 | End: 2022-05-27 | Stop reason: HOSPADM

## 2022-05-24 RX ORDER — SODIUM CHLORIDE 0.9 % (FLUSH) 0.9 %
5-40 SYRINGE (ML) INJECTION PRN
Status: DISCONTINUED | OUTPATIENT
Start: 2022-05-24 | End: 2022-05-27 | Stop reason: HOSPADM

## 2022-05-24 RX ORDER — ONDANSETRON 4 MG/1
4 TABLET, ORALLY DISINTEGRATING ORAL EVERY 8 HOURS PRN
Status: DISCONTINUED | OUTPATIENT
Start: 2022-05-24 | End: 2022-05-27 | Stop reason: HOSPADM

## 2022-05-24 RX ORDER — METHYLPREDNISOLONE SODIUM SUCCINATE 125 MG/2ML
125 INJECTION, POWDER, LYOPHILIZED, FOR SOLUTION INTRAMUSCULAR; INTRAVENOUS ONCE
Status: COMPLETED | OUTPATIENT
Start: 2022-05-24 | End: 2022-05-24

## 2022-05-24 RX ORDER — ACETAMINOPHEN 325 MG/1
650 TABLET ORAL EVERY 6 HOURS PRN
Status: DISCONTINUED | OUTPATIENT
Start: 2022-05-24 | End: 2022-05-27 | Stop reason: HOSPADM

## 2022-05-24 RX ORDER — SODIUM CHLORIDE 9 MG/ML
INJECTION, SOLUTION INTRAVENOUS PRN
Status: DISCONTINUED | OUTPATIENT
Start: 2022-05-24 | End: 2022-05-27 | Stop reason: HOSPADM

## 2022-05-24 RX ORDER — ONDANSETRON 2 MG/ML
4 INJECTION INTRAMUSCULAR; INTRAVENOUS EVERY 6 HOURS PRN
Status: DISCONTINUED | OUTPATIENT
Start: 2022-05-24 | End: 2022-05-27 | Stop reason: HOSPADM

## 2022-05-24 RX ORDER — SODIUM CHLORIDE 0.9 % (FLUSH) 0.9 %
5-40 SYRINGE (ML) INJECTION EVERY 12 HOURS SCHEDULED
Status: DISCONTINUED | OUTPATIENT
Start: 2022-05-24 | End: 2022-05-27 | Stop reason: HOSPADM

## 2022-05-24 RX ORDER — POLYETHYLENE GLYCOL 3350 17 G/17G
17 POWDER, FOR SOLUTION ORAL DAILY PRN
Status: DISCONTINUED | OUTPATIENT
Start: 2022-05-24 | End: 2022-05-27 | Stop reason: HOSPADM

## 2022-05-24 RX ADMIN — ASPIRIN 81 MG 324 MG: 81 TABLET ORAL at 11:23

## 2022-05-24 RX ADMIN — METHYLPREDNISOLONE SODIUM SUCCINATE 125 MG: 125 INJECTION, POWDER, FOR SOLUTION INTRAMUSCULAR; INTRAVENOUS at 10:25

## 2022-05-24 ASSESSMENT — PAIN - FUNCTIONAL ASSESSMENT: PAIN_FUNCTIONAL_ASSESSMENT: NONE - DENIES PAIN

## 2022-05-24 ASSESSMENT — ENCOUNTER SYMPTOMS
VOMITING: 0
COUGH: 1
STRIDOR: 0
EYE DISCHARGE: 0
WHEEZING: 0
COLOR CHANGE: 0
RHINORRHEA: 0
CONSTIPATION: 0
DIARRHEA: 0
SHORTNESS OF BREATH: 0
SORE THROAT: 0
NAUSEA: 0
ABDOMINAL PAIN: 0
BACK PAIN: 0
ABDOMINAL DISTENTION: 0

## 2022-05-24 ASSESSMENT — LIFESTYLE VARIABLES
HOW MANY STANDARD DRINKS CONTAINING ALCOHOL DO YOU HAVE ON A TYPICAL DAY: 1 OR 2
HOW OFTEN DO YOU HAVE A DRINK CONTAINING ALCOHOL: NEVER

## 2022-05-24 NOTE — ACP (ADVANCE CARE PLANNING)
Advance Care Planning     Advance Care Planning Activator (Inpatient)  Conversation Note      Date of ACP Conversation: 5/24/2022     Conversation Conducted with: Patient with Decision Making Capacity    ACP Activator: Georgiana Eubanks RN    Pt states that he does have a living will and that it is current with his wishes. Pt's daughter states she has a copy and is encouraged to either bring it in or take it to pt's next pcp appt so that it cane be scanned into the system. Health Care Decision Maker:     Current Designated Health Care Decision Maker:     Primary Decision Maker: Pravin Cardozo Child - [phone]    Care Preferences    Ventilation: \"If you were in your present state of health and suddenly became very ill and were unable to breathe on your own, what would your preference be about the use of a ventilator (breathing machine) if it were available to you? \"      Would the patient desire the use of ventilator (breathing machine)?: yes    \"If your health worsens and it becomes clear that your chance of recovery is unlikely, what would your preference be about the use of a ventilator (breathing machine) if it were available to you? \"     Would the patient desire the use of ventilator (breathing machine)?: No      Resuscitation  \"CPR works best to restart the heart when there is a sudden event, like a heart attack, in someone who is otherwise healthy. Unfortunately, CPR does not typically restart the heart for people who have serious health conditions or who are very sick. \"    \"In the event your heart stopped as a result of an underlying serious health condition, would you want attempts to be made to restart your heart (answer \"yes\" for attempt to resuscitate) or would you prefer a natural death (answer \"no\" for do not attempt to resuscitate)? \" yes       [x] Yes   [] No   Educated Patient / Alberto Cardoza regarding differences between Advance Directives and portable DNR orders.     Length of ACP Conversation in minutes:  10    Conversation Outcomes:  [x] ACP discussion completed  [] Existing advance directive reviewed with patient; no changes to patient's previously recorded wishes  [] New Advance Directive completed  [] Portable Do Not Rescitate prepared for Provider review and signature  [] POLST/POST/MOLST/MOST prepared for Provider review and signature      Follow-up plan:    [] Schedule follow-up conversation to continue planning  [] Referred individual to Provider for additional questions/concerns   [] Advised patient/agent/surrogate to review completed ACP document and update if needed with changes in condition, patient preferences or care setting    [x] This note routed to one or more involved healthcare providers

## 2022-05-24 NOTE — ED NOTES
2nd attempt to call report.  No answer on [address] [place] [address], RN  05/24/22 4046

## 2022-05-24 NOTE — CARE COORDINATION
[facility] Case Management Initial Discharge Assessment    Met with Patient and daughter to discuss discharge plan. PCP: DARCI Brock                                Date of Last Visit: 2 weeks    [facility] Patient: No        [facility] Notified: no    If no PCP, list provided? N/A    Discharge Planning    Living Arrangements: independently at home    Who do you live with? daughter    Who helps you with your care:  self    If lives at home:     Do you have any barriers navigating in your home? no    Patient can perform ADL? Yes    Current Services (outpatient and in home) :  None    Dialysis: No    Is transportation available to get to your appointments? Yes    DME Equipment:  cane    Respiratory equipment: None    Respiratory provider:  no     Pharmacy:  yes - [facility]    Consult with Medication Assistance Program?  No      Does Patient Have a High-Risk for Readmission Diagnosis (CHF, PN, MI, COPD)? No        Initial Discharge Plan? (Note: please see concurrent daily documentation for any updates after initial note). Pt became short of breath even with speaking. Cm to assess for d/c needs and referrals.     Readmission Risk              Risk of Unplanned Readmission:  0         Electronically signed by Valdo Vyas RN on 5/24/2022 at 12:29 PM

## 2022-05-24 NOTE — ED TRIAGE NOTES
Pt reports to the ED via EMS with co fall, cough, and difficulty ambulating. Pt fell this morning, was witnessed by daughter pt did not hit his head, -LOC. Pt states he stopped taking his blood thinner 2 days ago. Pt states \"I stopped taking it because I believe it was causing me to have the diarrhea. \"  Upon assessment pt is a/ox3 resp even and unlabored, skin pwd. Pt states he has pain in head and neck after a coughing attack . Pt states he is having fever, chills and diarrhea.

## 2022-05-24 NOTE — ED PROVIDER NOTES
[facility] ED  eMERGENCY dEPARTMENT eNCOUnter      Pt Name: Maria Isabel Chavez  MRN: [id number]  Kikigfmaria teresa [birth year]  Date of evaluation: 5/24/2022  Provider: Mushtaq Muñoz PA-C    CHIEF COMPLAINT       Chief Complaint   Patient presents with    Fall         HISTORY OF PRESENT ILLNESS   (Location/Symptom, Timing/Onset,Context/Setting, Quality, Duration, Modifying Factors, Severity)  Note limiting factors. Maria Isabel Chavez is a 65 y.o. male who presents to the emergency department with complaint of fall which was witnessed by patient's daughter. She states that after the fall, she states he was confused, she states he had difficulty using his right upper extremity, she states she had it drawn in the, and he was holding it. She states this lasted for period of approximately 5 minutes, and then it did seem to resolve. Daughter states patient's had multiple falls over the last 72 hours. Patient states he had a cough ongoing for many months, he states been evaluated for this previously, he states when he coughs, he feels dizzy and lightheaded. His fall this morning he has no specific injury from his fall, denies any head neck or back pain. There is no numbness or tingling, no chest pain or shortness of breath. Patient states cough is dry nonproductive, he does not have fevers, he states been eating and drinking as well. He did have an appointment at 9 AM this morning with his primary provider, but due to the fall he was sent to the ER for evaluation. Patient is alone in the emergency department on his arrival, he has no additional complaints other than ongoing persistent cough which she states is not resolving, and no one could find an underlying cause. Patient states that he was on Plavix up until recently, he states that he did electively take him some off the medication, he states he believes this is been causing diarrhea for him.   Past medical history significant for diabetes, aortic aneurysm, gout, polymyalgia rheumatica, neuropathy, hypertension. HPI    NursingNotes were reviewed. REVIEW OF SYSTEMS    (2-9 systems for level 4, 10 or more for level 5)     Review of Systems   Constitutional: Positive for chills. Negative for activity change, appetite change, fatigue and fever. HENT: Negative for congestion, ear discharge, ear pain, nosebleeds, rhinorrhea and sore throat. Eyes: Negative for discharge. Respiratory: Positive for cough. Negative for shortness of breath, wheezing and stridor. Cardiovascular: Negative for chest pain, palpitations and leg swelling. Gastrointestinal: Negative for abdominal distention, abdominal pain, constipation, diarrhea, nausea and vomiting. Genitourinary: Negative for difficulty urinating and dysuria. Musculoskeletal: Negative for arthralgias, back pain and neck pain. Skin: Negative for color change, pallor, rash and wound. Neurological: Negative for dizziness, tremors, syncope, weakness, numbness and headaches. Psychiatric/Behavioral: Negative for agitation and confusion. Except as noted above the remainder of the review of systems was reviewed and negative.        PAST MEDICAL HISTORY     Past Medical History:   Diagnosis Date    AAA [facility]ab[facility] aortic [facility]) ([facility]     Abdominal aortic aneurysm (AAA) without rupture (HCC)     Callus of foot 9/26/2013    Diastasis recti 9/18/2013    DM (diabetes mellitus) ([address]     Gout     Gout     Hyperlipidemia     Hypertension     Neuropathy     secondary to DM    Neuropathy     PMR (polymyalgia rheumatica) ([address] 5/19/2014    Polymyalgia rheumatica ([facility]          SURGICALHISTORY       Past Surgical History:   Procedure Laterality Date    BACK SURGERY      CHOLECYSTECTOMY      HAND SURGERY Left 11/18/2015    dupuytren''s release with left thumb    INGUINAL HERNIA REPAIR      right side    VASCULAR SURGERY Left oct 18th 2019    lleft leg triple bypass          CURRENT MEDICATIONS Previous Medications    ACETAMINOPHEN (TYLENOL) 325 MG CAPS    Take by mouth    BLOOD GLUCOSE TEST STRIPS (ONE TOUCH TEST STRIPS) STRIP    Check blood sugar  twice daily As needed. CLOPIDOGREL (PLAVIX) 75 MG TABLET    Take 1 tablet by mouth once daily    INDOMETHACIN (INDOCIN) 50 MG CAPSULE    Take 1 capsule by mouth 3 times daily    LANCETS MISC    Test bid    METFORMIN (GLUCOPHAGE) 1000 MG TABLET    Take 1 tablet by mouth twice daily    MISC NATURAL PRODUCTS (GINKOGIN PO)    Take by mouth daily With [place]    MISC. DEVICES MISC    [employer]    ONDANSETRON (ZOFRAN ODT) 4 MG DISINTEGRATING TABLET    Take 1 tablet by mouth every 4-6 hours as needed for Nausea or Vomiting    PREDNISONE (DELTASONE) 10 MG TABLET    Take 1 tablet by mouth daily    UNABLE TO FIND    daily Nervive       ALLERGIES     Patient has no known allergies. FAMILY HISTORY     No family history on file. SOCIAL HISTORY       Social History     Socioeconomic History    Marital status:       Spouse name: Not on file    Number of children: Not on file    Years of education: Not on file    Highest education level: Not on file   Occupational History    Not on file   Tobacco Use    Smoking status: Former Smoker     Packs/day: 1.00     Years: 30.00     Pack years: 30.00     Types: Cigarettes     Quit date: 1995     Years since quittin.5    Smokeless tobacco: Never Used   Vaping Use    Vaping Use: Never used   Substance and Sexual Activity    Alcohol use: No     Alcohol/week: 0.0 standard drinks    Drug use: No    Sexual activity: Not Currently   Other Topics Concern    Not on file   Social History Narrative    Not on file     Social Determinants of Health     Financial Resource Strain: Low Risk     Difficulty of Paying Living Expenses: Not hard at all   Food Insecurity: No Food Insecurity    Worried About Running Out of Food in the Last Year: Never true    Rosy of Food in the Last Year: Never true Transportation Needs:     Lack of Transportation (Medical): Not on file    Lack of Transportation (Non-Medical): Not on file   Physical Activity:     Days of Exercise per Week: Not on file    Minutes of Exercise per Session: Not on file   Stress:     Feeling of Stress : Not on file   Social Connections:     Frequency of Communication with Friends and Family: Not on file    Frequency of Social Gatherings with Friends and Family: Not on file    Attends Ca[employer] Services: Not on file    Active Member of [address] [place] [address] [employer] or Organizations: Not on file    Attends Club or Organization Meetings: Not on file    Marital Status: Not on file   Intimate Partner Violence:     Fear of Current or Ex-Partner: Not on file    Emotionally Abused: Not on file    Physically Abused: Not on file    Sexually Abused: Not on file   Housing Stability:     Unable to Pay for Housing in the Last Year: Not on file    Number of [place] in the Last Year: Not on file    Unstable Housing in the Last Year: Not on file       SCREENINGS   NIH Stroke Scale  Interval: Baseline  Level of Consciousness (1a): Alert  LOC Questions (1b): Answers both correctly  LOC Commands (1c): Performs both tasks correctly  Best Gaze (2): Normal  Visual (3): No visual loss  Facial Palsy (4): Normal symmetrical movement  Motor Arm, Left (5a): No drift  Motor Arm, Right (5b): No drift  Motor Leg, Left (6a): No drift  Motor Leg, Right (6b):  No drift  Limb Ataxia (7): Absent  Sensory (8): Normal  Best Language (9): No aphasia  Dysarthria (10): Normal  Extinction and Inattention (11): No abnormality  Total: 0Glasgow Coma Scale  Eye Opening: Spontaneous  Best Verbal Response: Oriented  Best Motor Response: Obeys commands  [place] Coma Scale Score: 15 @FLOW([id number])@      PHYSICAL EXAM    (up to 7 for level 4, 8 or more for level 5)     ED Triage Vitals [05/24/22 0808]   BP Temp Temp Source Pulse Resp SpO2 Height Weight   (!) 169/91 98.6 °F (37 °C) Oral 89 20 97 % 5' 10\" (1.778 m) 155 lb (70.3 kg)       Physical Exam  Vitals and nursing note reviewed. Constitutional:       General: He is not in acute distress. Appearance: He is well-developed. He is not ill-appearing, toxic-appearing or diaphoretic. HENT:      Head: Normocephalic and atraumatic. Comments: No visible signs of injuries, no cut scrapes abrasions, no depressions, no deformity, no pain on palpation     Right Ear: Tympanic membrane normal.      Left Ear: Tympanic membrane normal.      Nose: Nose normal. No congestion. Mouth/Throat:      Mouth: Mucous membranes are moist.      Pharynx: No oropharyngeal exudate or posterior oropharyngeal erythema. Eyes:      Extraocular Movements: Extraocular movements intact. Conjunctiva/sclera: Conjunctivae normal.      Pupils: Pupils are equal, round, and reactive to light. Comments: Pupils are equal round and reactive to light, no nystagmus. Neck:      Vascular: No JVD. Trachea: No tracheal deviation. Comments: Neck is supple, there is no  Tenderness, full range of motion without pain. Cardiovascular:      Rate and Rhythm: Normal rate. Pulses: Normal pulses. Heart sounds: Normal heart sounds. No murmur heard. No friction rub. No gallop. Pulmonary:      Effort: Pulmonary effort is normal. No tachypnea, accessory muscle usage, respiratory distress or retractions. Breath sounds: Normal breath sounds. No stridor. No wheezing, rhonchi or rales. Comments: Lung sounds are clear in all fields, no wheezes rales or rhonchi, no excess emesis, tractions, room air saturations are 97%. Patient has dry nonproductive cough. Chest:      Chest wall: No tenderness. Abdominal:      General: Abdomen is flat. Bowel sounds are normal. There is no distension or abdominal bruit. Palpations: Abdomen is soft. There is no shifting dullness, fluid wave, hepatomegaly, splenomegaly, mass or pulsatile mass. Tenderness:  There is no abdominal tenderness. There is no right CVA tenderness, left CVA tenderness, guarding or rebound. Negative signs include Hodgson's sign, Rovsing's sign and McBurney's sign. Comments: Abdomen soft nondistended nontender no guarding mass rebound, no CVA tenderness. Musculoskeletal:         General: No deformity. Cervical back: Normal range of motion and neck supple. No rigidity. Comments: Patient is moving all extremities well, no pain on palpation cervical spine, thoracic back, lumbar spine, pelvis stable there is no crepitus or instability, no shortening or rotation of bilateral lower extremities, no femoral pain, no knee pain, no tib-fib, foot or ankle pain bilaterally, no pain across shoulders, humerus, elbows, wrist hand or fingers. Upper and lower extremities are neurovascular intact. Skin:     General: Skin is warm and dry. Capillary Refill: Capillary refill takes less than 2 seconds. Coloration: Skin is not jaundiced. Neurological:      General: No focal deficit present. Mental Status: He is alert and oriented to person, place, and time. Mental status is at baseline. Cranial Nerves: No cranial nerve deficit. Sensory: No sensory deficit. Motor: No weakness. Coordination: Coordination normal.      Comments: Patient is alert and oriented, he does recall a fall this morning, he states that he was coughing, when he felt dizzy. Is neurologically intact, no facial droop, no slurring of speech, no drift upper lower extremities.    Psychiatric:         Mood and Affect: Mood normal.         DIAGNOSTIC RESULTS     EKG: All EKG's are interpreted by the Emergency Department Physician who either signs or Co-signsthis chart in the absence of a cardiologist.    EKG shows normal sinus rhythm 93 bpm T wave inversions in leads aVL V1 no ventricular ectopy  ms    RADIOLOGY:   Non-plain filmimages such as CT, Ultrasound and MRI are read by the radiologist. Marie Garcia radiographic images are visualized and preliminarily interpreted by the emergency physician with the below findings:        Interpretation per the Radiologist below, if available at the time ofthis note:    CT CHEST [address]   Final Result   No evidence of traumatic chest pathology. Chronic interstitial and emphysematous changes seen. CT Head WO Contrast   Final Result      There is no acute intracranial process.                    ED BEDSIDE ULTRASOUND:   Performed by ED Physician - none    LABS:  Labs Reviewed   COMPREHENSIVE METABOLIC PANEL - Abnormal; Notable for the following components:       Result Value    CO2 19 (*)     Anion Gap 18 (*)     Glucose 215 (*)     BUN 24 (*)     CREATININE 1.22 (*)     GFR Non- 57.4 (*)     All other components within normal limits   CBC WITH AUTO DIFFERENTIAL - Abnormal; Notable for the following components:    RBC 4.15 (*)     Hemoglobin 12.1 (*)     Hematocrit 35.3 (*)     RDW 15.6 (*)     All other components within normal limits   URINALYSIS WITH REFLEX TO CULTURE - Abnormal; Notable for the following components:    Ketones, Urine 15 (*)     Protein, UA 30 (*)     All other components within normal limits   APTT - Abnormal; Notable for the following components:    aPTT 20.0 (*)     All other components within normal limits   MICROSCOPIC URINALYSIS - Abnormal; Notable for the following components:    RBC, UA 3-5 (*)     All other components within normal limits   POCT ARTERIAL - Abnormal; Notable for the following components:    POC Sodium 135 (*)     POC Glucose 190 (*)     pH, Arterial 7.457 (*)     pCO2, Arterial 28 (*)     pO2, Arterial 74 (*)     HCO3, Arterial 19.4 (*)     Base Excess, Arterial -4 (*)     O2 Sat, Arterial 96 (*)     TCO2, Arterial 20 (*)     POC Hematocrit 34 (*)     Hemoglobin 11.4 (*)     All other components within normal limits   COVID-19, RAPID   RAPID INFLUENZA A/B ANTIGENS   URINE DRUG SCREEN   ETHANOL   TROPONIN CK   PROCALCITONIN   PROTIME-INR       All other labs were within normal range or not returned as of this dictation. EMERGENCY DEPARTMENT COURSE and DIFFERENTIAL DIAGNOSIS/MDM:   Vitals:    Vitals:    05/24/22 0808 05/24/22 0821 05/24/22 0930 05/24/22 1000   BP: (!) 169/91 (!) 169/91 (!) 154/89 (!) 123/104   Pulse: 89 89 96 95   Resp: 20 20 21 18   Temp: 98.6 °F (37 °C) 98.6 °F (37 °C)     TempSrc: Oral Oral     SpO2: 97% 97% 94% 93%   Weight: 155 lb (70.3 kg)      Height: 5' 10\" (1.778 m)             MDM  Number of Diagnoses or Management Options  Dizziness  Fall, initial encounter  TIA (transient ischemic attack)  Diagnosis management comments: Patient presented to the emergency department with complaint of fall at home. Daughter states that she had witnessed the fall, she states when he fell to the ground, he was unable to get up on his own, she states he is normally very independent, and should build to get up on his own, she states she noticed some weakness which she thought was to his right upper extremity as she was holding it close to his body. When she got to him she states he was mildly confused, and this lasted for a brief period of time and then he did become alert and oriented, by the time he came to the emergency department, he was alert, can answer all questions appropriately, but had some mild confusion secondary to his early dementia. He is moving all extremities well, there is no facial droop or slurring of his speech. He also complained of a persistent cough which has had for ongoing for last couple of months, he has been seen for this in the past without acute findings. CT scan of the brain today was completed without acute findings, I did CT of the chest also due to persistent cough, and this shows findings which could be consistent with that chronic changes of COPD. Saturations have been good during his visit in ED, and blood gases show no acute process.   Any labs are fairly unimpressive at this time, patient will be admitted to the hospital for the potential of TIA, dizziness, generalized weakness. I did speak with Dr. Katie Sullivan the [address] [place], [place] [address]ist, he will accept admission this patient for further evaluation management. Amount and/or Complexity of Data Reviewed  Decide to obtain previous medical records or to obtain history from someone other than the patient: yes        CRITICAL CARE TIME   Total Critical Care time was 0 minutes, excluding separately reportableprocedures. There was a high probability of clinicallysignificant/life threatening deterioration in the patient's condition which required my urgent intervention. CONSULTS:  None    PROCEDURES:  Unless otherwise noted below, none     Procedures    FINAL IMPRESSION      1. TIA (transient ischemic attack)    2. Fall, initial encounter    3. Dizziness          DISPOSITION/PLAN   DISPOSITION Decision To Admit 05/24/2022 11:07:19 AM      PATIENT REFERRED TO:  No follow-up provider specified.     DISCHARGE MEDICATIONS:  New Prescriptions    No medications on file          (Please note that portions of this note were completed with a voice recognition program.  Efforts were made to edit the dictations but occasionally words are mis-transcribed.)    Pinky Bhatia PA-C (electronically signed)  Attending Emergency Physician         Pinky Bhatia PA-C  05/24/22 [id number]

## 2022-05-24 NOTE — H&P
Hospital Medicine  History and Physical    Patient:  Dari Bennett  MRN: [id number]    CHIEF COMPLAINT:    Chief Complaint   Patient presents with    Fall       History Obtained From:  Patient, EMR  Primary Care Physician: DARCI Khan    HISTORY OF PRESENT ILLNESS:   The patient is a 65 y.o. male who presents with syncope. Duration of symptoms: Days to weeks. Timing: Gradually worsening. No aggravating relieving factors. Patient was in his usual state of health, was walking to the barn to feed the horses when he fell, this was witnessed by daughter. No evidence of shaking or convulsions. Patient is aware that he is falling from time to time. He states that it can be associated with cold sweats and dizziness. No post fall confusion noted as per daughter or patient. Past Medical History:      Diagnosis Date    AAA (abdominal aortic aneurysm) (HCC)     Abdominal aortic aneurysm (AAA) without rupture (HCC)     Callus of foot 9/26/2013    Diastasis recti 9/18/2013    DM (diabetes mellitus) ([facility] [address]     Gout     Gout     Hyperlipidemia     Hypertension     Neuropathy     secondary to DM    Neuropathy     PMR (polymyalgia rheumatica) ([facility] [address] 5/19/2014    Polymyalgia rheumatica ([facility] [address]        Past Surgical History:      Procedure Laterality Date    BACK SURGERY      CHOLECYSTECTOMY      HAND SURGERY Left 11/18/2015    dupuytren''s release with left thumb    INGUINAL HERNIA REPAIR      right side    VASCULAR SURGERY Left oct 18th 2019    lleft leg triple bypass        Medications Prior to Admission:    Prior to Admission medications    Medication Sig Start Date End Date Taking?  Authorizing Provider   ondansetron (ZOFRAN ODT) 4 MG disintegrating tablet Take 1 tablet by mouth every 4-6 hours as needed for Nausea or Vomiting 5/17/22   Rogelio See DO   predniSONE (DELTASONE) 10 MG tablet Take 1 tablet by mouth daily 5/3/22   DARCI Cazares   metFORMIN (GLUCOPHAGE) 1000 MG tablet Take 1 tablet by mouth twice daily 4/21/22   DARCI Krueger   clopidogrel (PLAVIX) 75 MG tablet Take 1 tablet by mouth once daily 4/21/22   DARCI Krueger   indomethacin (INDOCIN) 50 MG capsule Take 1 capsule by mouth 3 times daily 4/9/22   DARCI Krueger   Acetaminophen (TYLENOL) 325 MG CAPS Take by mouth    Historical Provider, MD   Misc Natural Products (GINKOGIN PO) Take by mouth daily With Toponas    Historical Provider, MD   UNABLE TO FIND daily Nervive    Historical Provider, MD   Misc. Devices [facility] 5/25/21   Alireza Lemos MD   blood glucose test strips (ONE TOUCH TEST STRIPS) strip Check blood sugar  twice daily As needed. 10/1/19   Alireza Lemos MD   Lancets MISC Test bid 9/9/18   Alireza Lemos MD       Allergies:  Patient has no known allergies. Social History:   TOBACCO:   reports that he quit smoking about 26 years ago. His smoking use included cigarettes. He has a 30.00 pack-year smoking history. He has never used smokeless tobacco.  ETOH:   reports no history of alcohol use. Family History:   No family history on file. REVIEW OF SYSTEMS:  Ten systems reviewed and negative except as stated in the HPI    Physical Exam:    Vitals: /74   Pulse 89   Temp 98.6 °F (37 °C) (Oral)   Resp 24   Ht 5' 10\" (1.778 m)   Wt 155 lb (70.3 kg)   SpO2 95%   BMI 22.24 kg/m²   General appearance: alert, appears stated age and cooperative  Skin: Skin color, texture, turgor normal. No rashes or lesions  HEENT: Head: Normocephalic, no lesions, without obvious abnormality. . No dental issues   Neck: no jugular venous distention  Lungs: clear to auscultation bilaterally  Heart: regular rate and rhythm, S1, S2 normal, no murmur, click, rub or gallop  Abdomen: soft, non-tender; bowel sounds normal; no masses,  no organomegaly  Extremities: extremities normal, atraumatic, no cyanosis or edema  Neurologic: Mental status: Alert, oriented, thought content appropriate. Recent Labs     05/24/22  0830 05/24/22  1010   WBC 10.5  --    HGB 12.1* 11.4*     --      Recent Labs     05/24/22  0845 05/24/22  1010     --    K 4.2  --    CL 99  --    CO2 19*  --    BUN 24*  --    CREATININE 1.22* 1.1   GLUCOSE 215*  --    AST 17  --    ALT 24  --    BILITOT 0.4  --    ALKPHOS 57  --      Troponin T:   Recent Labs     05/24/22 0845   TROPONINI <0.010       ABGs:   Lab Results   Component Value Date    PHART 7.457 05/24/2022    PO2ART 74 05/24/2022    ZUU7QLS 28 05/24/2022     INR:   Recent Labs     05/24/22 0845   INR 1.1     URINALYSIS:  Recent Labs     05/24/22 0830   COLORU Yellow   PHUR 5.0   WBCUA 0-2   RBCUA 3-5*   BACTERIA Negative   CLARITYU Clear   SPECGRAV 1.019   LEUKOCYTESUR Negative   UROBILINOGEN 0.2   BILIRUBINUR Negative   BLOODU Negative   GLUCOSEU Negative     -----------------------------------------------------------------   CT Head WO Contrast    Result Date: 5/24/2022  EXAMINATION: CT HEAD WO CONTRAST, 5/24/2022 8:59 AM CLINICAL HISTORY:  fall this am COMPARISON: Brain CT from 8/17/2022 and January 31, 2019. TECHNIQUE:  Multiple contiguous axial images of the head were obtained from the skull base through the skull vertex without intravenous contrast. Sagittal and coronal 3D reformats have been produced. All CT scans at this facility use dose modulation, iterative reconstruction, and/or weight based dosing when appropriate to reduce radiation dose to as low as reasonably achievable. BRAIN CT FINDINGS: Gray-white matter differentiation is maintained. No acute hemorrhage, mass, mass effect, or midline shift. There is prominence of sulci and ventricles indicating mild global cerebral atrophy and chronic involutional changes. There is a persistent chronic area of encephalomalacia in the periventricular white matter adjacent to the anterior portion of the left lateral ventricle, stable since prior study.   The subcortical and periventricular white matter is otherwise within normal limits. The basal ganglia are within normal limits. There are no acute changes or space-occupying lesions in the posterior fossa. The visualized portions of the orbits are within normal limits. The globes are intact. The imaged portions of the paranasal sinuses are unremarkable. The calvarium is intact. There is no acute intracranial process. CT CHEST WO CONTRAST    Result Date: 5/24/2022  INDICATION: 65-year-old male status post trauma presenting with chest pain and cough. COMPARISON: May 17, 2022. . TECHNIQUE: Multidetector CT imaging of the chest was preformed without administration of intravenous contrast. Coronal and sagittal reformatted images were obtained. Automated dose exposure control was used for this exam. FINDINGS: The thoracic aorta demonstrates unremarkable contours with no evidence of aneurysmal dilatation or arterial dissection. The pulmonary vessels demonstrates unremarkable contours. The cardiac chambers are unremarkable. No evidence of parenchymal contusions or pneumothorax, no evidence of pleural fluid collection is seen. Mild prominence of the bronchovascular and interstitial lung markings is visualized, dependent atelectatic changes visualized in the lower lung fields bilaterally with subtle bullous disease and bronchiectatic changes seen. Emphysematous changes visualized. Degenerative bone changes, no evidence of acute fracture is seen. Limited evaluation of the upper abdomen is unremarkable. No evidence of traumatic chest pathology. Chronic interstitial and emphysematous changes seen. Assessment and Plan   1. Syncope: Broad differential including but not limited to baroreceptor dysfunction which may be age-related or secondary to neuropathy, medications, postprandial hypotension, arrhythmias, seizure. History is not suggestive of seizure. Telemetry to monitor for arrhythmia.   Echocardiogram.  History of carotid disease as per family, will repeat ultrasound carotids. Consult with cardiology and neurology. 2. Polymyalgia rheumatica: Improved with steroids. 3. Diabetes: Monitor glucose accordion, adjust medications as needed. 4. Hypertension: Monitor blood pressure, adjust medications as needed  5. Await completion of home medications by admitting RN in order to complete physician medication reconciliation  6. DVT ppx  7. Disposition: Dependent on hospital course. Will discharge once medically stable. SW on board for discharge planning.      Patient Active Problem List   Diagnosis Code    Umbilical hernia without obstruction or gangrene K42.9    Type 2 diabetes mellitus without complication, without long-term current use of insulin ([facility]) E11.9    HTN (hypertension) I10    Hyperlipidemia E78.5    PMR (polymyalgia rheumatica) ([facility]) M35.3    Abdominal aortic aneurysm (AAA) without rupture ([facility]) I71.4    Abnormal ankle brachial index ([facility]) R68.89    PAD (peripheral artery disease) ([facility]) I73.9    Alzheimer's dementia without behavioral disturbance ([facility]) G30.9, F02.80    Aneurysm, common iliac artery ([facility] [address] I72.3    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[facility]) I72.4    Cerebrovascular accident (CVA) ([facility] [address] I63.9    Ataxic gait R26.0    Cognitive impairment R41.89    Bilateral carotid artery stenosis I65.23    Syncope and collapse Nasima Kidd MD, MD

## 2022-05-24 NOTE — ED NOTES
Report called to 2W. Tele pack applied. Transportation notified. Patient updated.       Kendra Garcia RN  05/24/22 [id number]

## 2022-05-25 ENCOUNTER — APPOINTMENT (OUTPATIENT)
Dept: ULTRASOUND IMAGING | Age: 77
DRG: 312 | End: 2022-05-25
Payer: MEDICARE

## 2022-05-25 ENCOUNTER — APPOINTMENT (OUTPATIENT)
Dept: CT IMAGING | Age: 77
DRG: 312 | End: 2022-05-25
Payer: MEDICARE

## 2022-05-25 PROBLEM — G31.84 MCI (MILD COGNITIVE IMPAIRMENT): Status: ACTIVE | Noted: 2021-02-03

## 2022-05-25 LAB
EKG ATRIAL RATE: 93 BPM
EKG P AXIS: 45 DEGREES
EKG P-R INTERVAL: 134 MS
EKG Q-T INTERVAL: 332 MS
EKG QRS DURATION: 80 MS
EKG QTC CALCULATION (BAZETT): 412 MS
EKG R AXIS: -8 DEGREES
EKG T AXIS: 109 DEGREES
EKG VENTRICULAR RATE: 93 BPM
GLUCOSE BLD-MCNC: 280 MG/DL (ref 70–99)
GLUCOSE BLD-MCNC: 364 MG/DL (ref 70–99)
HBA1C MFR BLD: 9.3 % (ref 4.8–5.9)
PERFORMED ON: ABNORMAL
PERFORMED ON: ABNORMAL

## 2022-05-25 PROCEDURE — 99222 1ST HOSP IP/OBS MODERATE 55: CPT | Performed by: PSYCHIATRY & NEUROLOGY

## 2022-05-25 PROCEDURE — 36415 COLL VENOUS BLD VENIPUNCTURE: CPT

## 2022-05-25 PROCEDURE — 70498 CT ANGIOGRAPHY NECK: CPT

## 2022-05-25 PROCEDURE — 96372 THER/PROPH/DIAG INJ SC/IM: CPT

## 2022-05-25 PROCEDURE — 2580000003 HC RX 258: Performed by: INTERNAL MEDICINE

## 2022-05-25 PROCEDURE — 6360000004 HC RX CONTRAST MEDICATION: Performed by: INTERNAL MEDICINE

## 2022-05-25 PROCEDURE — G0378 HOSPITAL OBSERVATION PER HR: HCPCS

## 2022-05-25 PROCEDURE — 99214 OFFICE O/P EST MOD 30 MIN: CPT | Performed by: INTERNAL MEDICINE

## 2022-05-25 PROCEDURE — 83036 HEMOGLOBIN GLYCOSYLATED A1C: CPT

## 2022-05-25 PROCEDURE — 6370000000 HC RX 637 (ALT 250 FOR IP): Performed by: INTERNAL MEDICINE

## 2022-05-25 PROCEDURE — 94664 DEMO&/EVAL PT USE INHALER: CPT

## 2022-05-25 PROCEDURE — 6360000002 HC RX W HCPCS: Performed by: INTERNAL MEDICINE

## 2022-05-25 PROCEDURE — 93880 EXTRACRANIAL BILAT STUDY: CPT

## 2022-05-25 RX ORDER — PREDNISONE 10 MG/1
10 TABLET ORAL DAILY
Status: DISCONTINUED | OUTPATIENT
Start: 2022-05-25 | End: 2022-05-27 | Stop reason: HOSPADM

## 2022-05-25 RX ORDER — SODIUM CHLORIDE 0.9 % (FLUSH) 0.9 %
10 SYRINGE (ML) INJECTION 2 TIMES DAILY
Status: DISCONTINUED | OUTPATIENT
Start: 2022-05-25 | End: 2022-05-27 | Stop reason: HOSPADM

## 2022-05-25 RX ORDER — BUDESONIDE 0.25 MG/2ML
0.25 INHALANT ORAL 2 TIMES DAILY
Status: DISCONTINUED | OUTPATIENT
Start: 2022-05-25 | End: 2022-05-27 | Stop reason: HOSPADM

## 2022-05-25 RX ORDER — ALBUTEROL SULFATE 2.5 MG/3ML
2.5 SOLUTION RESPIRATORY (INHALATION) EVERY 4 HOURS PRN
Status: DISCONTINUED | OUTPATIENT
Start: 2022-05-25 | End: 2022-05-27 | Stop reason: HOSPADM

## 2022-05-25 RX ORDER — IPRATROPIUM BROMIDE AND ALBUTEROL SULFATE 2.5; .5 MG/3ML; MG/3ML
1 SOLUTION RESPIRATORY (INHALATION) 4 TIMES DAILY
Status: DISCONTINUED | OUTPATIENT
Start: 2022-05-25 | End: 2022-05-25

## 2022-05-25 RX ORDER — IPRATROPIUM BROMIDE AND ALBUTEROL SULFATE 2.5; .5 MG/3ML; MG/3ML
1 SOLUTION RESPIRATORY (INHALATION) EVERY 4 HOURS PRN
Status: DISCONTINUED | OUTPATIENT
Start: 2022-05-25 | End: 2022-05-27 | Stop reason: HOSPADM

## 2022-05-25 RX ADMIN — ENOXAPARIN SODIUM 40 MG: 100 INJECTION SUBCUTANEOUS at 20:29

## 2022-05-25 RX ADMIN — IOPAMIDOL 100 ML: 612 INJECTION, SOLUTION INTRAVENOUS at 18:05

## 2022-05-25 RX ADMIN — Medication 10 ML: at 00:33

## 2022-05-25 RX ADMIN — Medication 10 ML: at 22:46

## 2022-05-25 RX ADMIN — PREDNISONE 10 MG: 10 TABLET ORAL at 20:28

## 2022-05-25 RX ADMIN — Medication 10 ML: at 09:41

## 2022-05-25 RX ADMIN — ENOXAPARIN SODIUM 40 MG: 100 INJECTION SUBCUTANEOUS at 00:33

## 2022-05-25 ASSESSMENT — ENCOUNTER SYMPTOMS
VOMITING: 0
WHEEZING: 0
TROUBLE SWALLOWING: 0
EYE REDNESS: 0
COLOR CHANGE: 0
PHOTOPHOBIA: 0
RHINORRHEA: 0
NAUSEA: 0
CONSTIPATION: 0
COUGH: 0
SHORTNESS OF BREATH: 0
APNEA: 0
CHOKING: 0
BACK PAIN: 0
CHEST TIGHTNESS: 0
DIARRHEA: 0
ABDOMINAL PAIN: 0

## 2022-05-25 NOTE — CONSULTS
Cardiology Consult Note  Patient: Damian Gallagher  Unit/Bed: J022/P840-34  YOB: 1945  MRN: [id number]  Acct: [de-identified]   Admitting Diagnosis: Syncope and collapse [R55]  TIA (transient ischemic attack) [G45.9]  Dizziness [R42]  Fall, initial encounter [W19. XXXA]  Date:  5/24/2022  Hospital Day: 0      Chief Complaint:  Syncope    Reason of this consult  Syncope    History of Present Illness:  25-year-old male with out prior cardiac history but significant for diabetes, hypertension hyperlipidemia admitted to the hospital after sustaining a syncopal episode. Per patient he has been suffering from diarrhea for the last few weeks. Yesterday he had an appointment to see his primary care doctor while still at home patient started feeling dizzy, he sat down on one of the branches in his house. And per daughter patient passed out.   Patient was brought to the ER by paramedics    In the ED EKG showed sinus rhythm  No signs of ischemia  TTE today EF 60% with no major valvular disease  Telemetry since admission sinus rhythm with no major arrhythmias    No Known Allergies    Current Facility-Administered Medications   Medication Dose Route Frequency Provider Last Rate Last Admin    sodium chloride flush 0.9 % injection 5-40 mL  5-40 mL IntraVENous 2 times per day Santo Davis MD   10 mL at 05/25/22 0941    sodium chloride flush 0.9 % injection 5-40 mL  5-40 mL IntraVENous PRN Santo Davis MD        0.9 % sodium chloride infusion   IntraVENous PRN Santo Davis MD        enoxaparin (LOVENOX) injection 40 mg  40 mg SubCUTAneous Daily Santo Davis MD   40 mg at 05/25/22 0033    ondansetron (ZOFRAN-ODT) disintegrating tablet 4 mg  4 mg Oral Q8H PRN Santo Davis MD        Or    ondansetron (ZOFRAN) injection 4 mg  4 mg IntraVENous Q6H PRN Santo Davis MD        polyethylene glycol (GLYCOLAX) packet 17 g  17 g Oral Daily PRN Santo Davis MD        acetaminophen (TYLENOL) tablet 650 mg 650 mg Oral Q6H PRN Cynthia Linares MD        Or    acetaminophen (TYLENOL) suppository 650 mg  650 mg Rectal Q6H PRN Cynthia Linares MD           PMHx:  Past Medical History:   Diagnosis Date    [facility]     [facility]     Callus of foot 2013    Diastasis recti 2013    DM [facility]diabetes mellitus) [facility]     Gout     Gout     Hyperlipidemia     Hypertension     Neuropathy     secondary to DM    Neuropathy     PMR (polymyalgia rheumatica) ([facility] 2014    Polymyalgia rheumatica (HCC)        PSHx:  Past Surgical History:   Procedure Laterality Date    BACK SURGERY      CHOLECYSTECTOMY      HAND SURGERY Left 2015    dupuytren''s release with left thumb    INGUINAL HERNIA REPAIR      right side    VASCULAR SURGERY Left oct 18th 2019    lleft leg triple bypass        Social Hx:  Social History     Socioeconomic History    Marital status:       Spouse name: Not on file    Number of children: Not on file    Years of education: Not on file    Highest education level: Not on file   Occupational History    Not on file   Tobacco Use    Smoking status: Former Smoker     Packs/day: 1.00     Years: 30.00     Pack years: 30.00     Types: Cigarettes     Quit date: 1995     Years since quittin.5    Smokeless tobacco: Never Used   Vaping Use    Vaping Use: Never used   Substance and Sexual Activity    Alcohol use: No     Alcohol/week: 0.0 standard drinks    Drug use: No    Sexual activity: Not Currently   Other Topics Concern    Not on file   Social History Narrative    Not on file     Social Determinants of Health     Financial Resource Strain: Low Risk     Difficulty of Paying Living Expenses: Not hard at all   Food Insecurity: No Food Insecurity    Worried About Running Out of Food in the Last Year: Never true    Rosy of Food in the Last Year: Never true   Transportation Needs:     Lack of Transportation (Medical): Not on file    Lack of Transportation (Non-Medical): Not on file   Physical Activity:     Days of Exercise per Week: Not on file    Minutes of Exercise per Session: Not on file   Stress:     Feeling of Stress : Not on file   Social Connections:     Frequency of Communication with Friends and Family: Not on file    Frequency of Social Gatherings with Friends and Family: Not on file    Attends Buddhism Services: Not on file    Active Member of [address] [place] [address] or Organizations: Not on file    Attends Club or Organization Meetings: Not on file    Marital Status: Not on file   Intimate Partner Violence:     Fear of Current or Ex-Partner: Not on file    Emotionally Abused: Not on file    Physically Abused: Not on file    Sexually Abused: Not on file   Housing Stability:     Unable to Pay for Housing in the Last Year: Not on file    Number of [place] in the Last Year: Not on file    Unstable Housing in the Last Year: Not on file       Family Hx:  No family history on file. Review of Systems:   Review of Systems   Constitutional: Negative for activity change, chills, diaphoresis and fever. HENT: Negative for congestion, ear pain, nosebleeds and rhinorrhea. Eyes: Negative for redness and visual disturbance. Respiratory: Negative for apnea, cough, chest tightness, shortness of breath and wheezing. Cardiovascular: Negative for chest pain, palpitations and leg swelling. Gastrointestinal: Negative for abdominal pain, constipation, diarrhea, nausea and vomiting. Genitourinary: Negative for difficulty urinating and dysuria. Musculoskeletal: Negative. Negative for joint swelling. Skin: Negative for color change, rash and wound. Neurological: Positive for dizziness. Negative for syncope, weakness, numbness and headaches. Psychiatric/Behavioral: Negative.           Physical Examination:    /80   Pulse 72   Temp 98.1 °F (36.7 °C)   Resp 18   Ht 5' 10\" (1.778 m) Wt 155 lb (70.3 kg)   SpO2 98%   BMI 22.24 kg/m²    Physical Exam  Vitals and nursing note reviewed. Constitutional:       Appearance: Normal appearance. HENT:      Head: Normocephalic and atraumatic. Mouth/Throat:      Mouth: Mucous membranes are moist.      Pharynx: Oropharynx is clear. Eyes:      Extraocular Movements: Extraocular movements intact. Conjunctiva/sclera: Conjunctivae normal.      Pupils: Pupils are equal, round, and reactive to light. Cardiovascular:      Rate and Rhythm: Normal rate and regular rhythm. Pulses: Normal pulses. Heart sounds: Normal heart sounds. Pulmonary:      Effort: Pulmonary effort is normal.      Breath sounds: Normal breath sounds. Abdominal:      General: Abdomen is flat. Bowel sounds are normal.      Palpations: Abdomen is soft. Musculoskeletal:         General: Normal range of motion. Cervical back: Normal range of motion and neck supple. Skin:     General: Skin is warm. Neurological:      General: No focal deficit present. Mental Status: He is alert and oriented to person, place, and time. Mental status is at baseline.    Psychiatric:         Mood and Affect: Mood normal.         LABS:  CBC:  Lab Results   Component Value Date    WBC 10.5 05/24/2022    RBC 4.15 05/24/2022    HGB 11.4 05/24/2022    HCT 35.3 05/24/2022    MCV 85.0 05/24/2022    MCH 29.1 05/24/2022    MCHC 34.3 05/24/2022    RDW 15.6 05/24/2022     05/24/2022    MPV 10.1 08/09/2017     CBC with Differential:   Lab Results   Component Value Date    WBC 10.5 05/24/2022    RBC 4.15 05/24/2022    HGB 11.4 05/24/2022    HCT 35.3 05/24/2022     05/24/2022    MCV 85.0 05/24/2022    MCH 29.1 05/24/2022    MCHC 34.3 05/24/2022    RDW 15.6 05/24/2022    BANDSPCT 1 05/24/2022    METASPCT 1 05/24/2022    LYMPHOPCT 6.0 05/24/2022    MONOPCT 7.6 05/24/2022    MYELOPCT 3 05/24/2022    BASOPCT 1.0 05/24/2022    MONOSABS 0.8 05/24/2022    LYMPHSABS 0.6 05/24/2022 EOSABS 0.4 05/24/2022    BASOSABS 0.1 05/24/2022     CMP:    Lab Results   Component Value Date     05/24/2022    K 4.2 05/24/2022    CL 99 05/24/2022    CO2 19 05/24/2022    BUN 24 05/24/2022    CREATININE 1.1 05/24/2022    CREATININE 1.22 05/24/2022    GFRAA >60 05/24/2022    LABGLOM >60 05/24/2022    GLUCOSE 215 05/24/2022    PROT 7.0 05/24/2022    LABALBU 3.8 05/24/2022    CALCIUM 9.5 05/24/2022    BILITOT 0.4 05/24/2022    ALKPHOS 57 05/24/2022    AST 17 05/24/2022    ALT 24 05/24/2022     BMP:    Lab Results   Component Value Date     05/24/2022    K 4.2 05/24/2022    CL 99 05/24/2022    CO2 19 05/24/2022    BUN 24 05/24/2022    LABALBU 3.8 05/24/2022    CREATININE 1.1 05/24/2022    CREATININE 1.22 05/24/2022    CALCIUM 9.5 05/24/2022    GFRAA >60 05/24/2022    LABGLOM >60 05/24/2022    GLUCOSE 215 05/24/2022     Magnesium:  No results found for: MG  Troponin:    Lab Results   Component Value Date    TROPONINI <0.010 05/24/2022       Radiology:  Echocardiogram complete 2D with doppler with color    Result Date: 5/24/2022  Transthoracic Echocardiography Report (TTE)  Demographics   Patient Name    Ricky Orellana Gender               Male   Patient Number  [id number]       Race                                                   Ethnicity   Visit Number    [id number]      Room Number          W286   Corporate ID                   Date of Study        05/24/2022   Accession       [id number]     Referring Physician  Number   Date of Birth   1945     Sonographer          Janette Alva   Age             65 year(s)     Interpreting         [facility])                                 Physician            Cardiology                                                      [facility]  Procedure Type of Study   TTE procedure:ECHO COMPLETE 2D W/DOP W/COLOR. Procedure Date Date: 05/24/2022 Start: 02:02 PM Study Location: Portable Technical Quality: Adequate visualization Indications:Syncope. RVSP: 26.82 mmHg              Estimated RAP: 10 mmHg  TR Velocity: 2.05 m/s                   TR Gradient: 16.82 mmHg   Pulmonic Valve            Estimated PASP: 26.82 mmHg  Structures  Right Atrium   RA Systolic Pressure: 10 mmHg   Right Ventricle            RV Systolic Pressure: 37.94 mmHg      CT Head WO Contrast    Result Date: 5/24/2022  EXAMINATION: CT HEAD WO CONTRAST, 5/24/2022 8:59 AM CLINICAL HISTORY:  fall this am COMPARISON: Brain CT from 8/17/2022 and January 31, 2019. TECHNIQUE:  Multiple contiguous axial images of the head were obtained from the skull base through the skull vertex without intravenous contrast. Sagittal and coronal 3D reformats have been produced. All CT scans at this facility use dose modulation, iterative reconstruction, and/or weight based dosing when appropriate to reduce radiation dose to as low as reasonably achievable. BRAIN CT FINDINGS: Gray-white matter differentiation is maintained. No acute hemorrhage, mass, mass effect, or midline shift. There is prominence of sulci and ventricles indicating mild global cerebral atrophy and chronic involutional changes. There is a persistent chronic area of encephalomalacia in the periventricular white matter adjacent to the anterior portion of the left lateral ventricle, stable since prior study. The subcortical and periventricular white matter is otherwise within normal limits. The basal ganglia are within normal limits. There are no acute changes or space-occupying lesions in the posterior fossa. The visualized portions of the orbits are within normal limits. The globes are intact. The imaged portions of the paranasal sinuses are unremarkable. The calvarium is intact. There is no acute intracranial process. CT CHEST WO CONTRAST    Result Date: 5/24/2022  INDICATION: 69-year-old male status post trauma presenting with chest pain and cough. COMPARISON: May 17, 2022. . TECHNIQUE: Multidetector CT imaging of the chest was preformed without administration of intravenous contrast. Coronal and sagittal reformatted images were obtained. Automated dose exposure control was used for this exam. FINDINGS: The thoracic aorta demonstrates unremarkable contours with no evidence of aneurysmal dilatation or arterial dissection. The pulmonary vessels demonstrates unremarkable contours. The cardiac chambers are unremarkable. No evidence of parenchymal contusions or pneumothorax, no evidence of pleural fluid collection is seen. Mild prominence of the bronchovascular and interstitial lung markings is visualized, dependent atelectatic changes visualized in the lower lung fields bilaterally with subtle bullous disease and bronchiectatic changes seen. Emphysematous changes visualized. Degenerative bone changes, no evidence of acute fracture is seen. Limited evaluation of the upper abdomen is unremarkable. No evidence of traumatic chest pathology. Chronic interstitial and emphysematous changes seen. US CAROTID ARTERY BILATERAL    Result Date: 5/25/2022  EXAMINATION: CAROTID ULTRASOUND CLINICAL HISTORY: 65-year-old who presents with vertigo and syncope. Prior smoking history. FINDINGS: Duplex and color Doppler ultrasound were performed of the bilateral extracranial carotid systems. Velocity criteria and internal carotid artery stenoses estimates are extrapolated from recommended modification of the SRU Consensus Conference criteria for internal carotid artery stenosis -October 2021. Comparison is made with a previous carotid ultrasound 11/20/2020 RIGHT CAROTID SYSTEM: There is intimal thickening in the common carotid artery with a moderate amount of heterogeneous calcified plaque in the bulb extending into the proximal ICA. Velocities in the ICA are again significantly elevated up to 356 cm/s. There is also an elevated ICA/CCA velocity ratio on today's examination.  Velocities in the ICA range from 356 cm/s proximal aspect, 169 cm/s mid aspect to 80 cm/s distal aspect. ICA/CCA velocity ratio is 4.3. By ultrasound criteria there is greater than 70%  diameter reduction of the right ICA. Peak systolic velocities in the proximal ECA and mid CCA are 121 and 83cm/s. There is antegrade flow in the right vertebral artery. LEFT CAROTID SYSTEM:  There is intimal thickening in the common carotid artery with a small amount of hypoechoic plaque in the mid common carotid artery and some scattered calcified plaque throughout the common carotid artery with moderate amount of calcified plaque in the bulb. There are no elevations of peak systolic velocities or ICA/CCA velocity ratios. Velocities in the ICA range from 146 cm/s proximal aspect, 91 cm/s mid aspect to 64 cm/s distal aspect. ICA/CCA velocity ratio is 0.8. By ultrasound criteria there is less than 50 percent diameter reduction of the left ICA. Peak systolic velocities in the proximal ECA and mid CCA are 157 and 178cm/s. There is antegrade flow in the left vertebral artery. ABNORMAL CAROTID ULTRASOUND. AGAIN DEMONSTRATED ARE SIGNIFICANT ELEVATION OF PEAK SYSTOLIC VELOCITIES IN THE RIGHT ICA. NOW THERE IS ALSO AN INCREASED ICA/CCA VELOCITY RATIO AT 4.3. BY ULTRASOUND CRITERIA THERE IS GREATER THAN 70% DIAMETER REDUCTION OF THE RIGHT ICA BUT LESS THAN NEAR OCCLUSION. CONSIDER CORRELATION WITH CT ANGIOGRAPHY IF CLINICALLY INDICATED. LESS THAN 50% DIAMETER REDUCTION OF THE LEFT ICA. ANTEGRADE VERTEBRAL FLOW .     EKG: Normal sinus rhythm      Assessment:    Active Hospital Problems    Diagnosis Date Noted    Dizziness [R42]      Priority: Medium    Syncope and collapse [R55] 05/24/2022     Priority: Medium    MCI (mild cognitive impairment) [G31.84] 02/03/2021     Priority: Low     Syncope. Unclear etiology less likely cardiac in nature, EKG normal sinus rhythm with a arrhythmias or signs of ischemia, echocardiogram normal EF without major valvular disease.   Possibly TIA  Diabetes  Hyperlipidemia  Carotid ultrasound performed today 5/25/2022 showing greater than 70% stenosis on the right ICA less than 50% stenosis on the left ICA      TTE 5/25/2022 EF 60%    Plan:  Continue telemetry  Continue neurology recommendations  Consider carotid stenting as an outpatient  From cardiology standpoint patient stable to be discharged  Will sign off  I will also arrange follow-up with me in clinic in 2 weeks after discharge          Electronically signed by Celine Abdi MD on 5/25/2022 at 5:07 PM

## 2022-05-25 NOTE — PLAN OF CARE
Pt remains free from falls. Skin remains free from breakdown. Bed alarm on, call light remains within pt reach.

## 2022-05-25 NOTE — PROGRESS NOTES
[facility] [place] Respiratory Therapy Evaluation   Current Order:  QID    Home Regimen: NONE     Ordering Physician: Clemente Garnett  Re-evaluation Date:  N/A     Diagnosis:  LOSS OF CONSCIOUSNESS     Patient Status: Stable / Unstable + Physician notified    The following MDI Criteria must be met in order to convert aerosol to MDI with spacer. If unable to meet, MDI will be converted to aerosol:  []  Patient able to demonstrate the ability to use MDI effectively  []  Patient alert and cooperative  []  Patient able to take deep breath with 5-10 second hold  []  Medication(s) available in this delivery method   []  Peak flow greater than or equal to 200 ml/min            Current Order Substituted To  (same drug, same frequency)   Aerosol to MDI [] Albuterol Sulfate 0.083% unit dose by aerosol Albuterol Sulfate MDI 2 puffs by inhalation with spacer    [] Levalbuterol 1.25 mg unit dose by aerosol Levalbuterol MDI 2 puffs by inhalation with spacer    [] Levalbuterol 0.63 mg unit dose by aerosol Levalbuterol MDI 2 puffs by inhalation with spacer    [] Ipratropium Bromide 0.02% unit dose by aerosol Ipratropium Bromide MDI 2 puffs by inhalation with spacer    [] Duoneb (Ipratropium + Albuterol) unit dose by aerosol Ipratropium MDI + Albuterol MDI 2 puffs by inhalation w/spacer   MDI to Aerosol [] Albuterol Sulfate MDI Albuterol Sulfate 0.083% unit dose by aerosol    [] Levalbuterol MDI 2 puffs by inhalation Levalbuterol 1.25 mg unit dose by aerosol    [] Ipratropium Bromide MDI by inhalation Ipratropium Bromide 0.02% unit dose by aerosol    [] Combivent (Ipratropium + Albuterol) MDI by inhalation Duoneb (Ipratropium + Albuterol) unit dose by aerosol       Treatment Assessment [Frequency/Schedule]:  Change frequency to: ________Q4PRN__________________________________________per Protocol, P&T, MEC      Points 0 1 2 3 4   Pulmonary Status  Non-Smoker  []   Smoking history   < 20 pack years  []   Smoking history  ?  20 pack years  [x] Pulmonary Disorder  (acute or chronic)  []   Severe or Chronic w/ Exacerbation  []     Surgical Status No [x]   Surgeries     General []   Surgery Lower []   Abdominal Thoracic or []   Upper Abdominal Thoracic with  PulmonaryDisorder  []     Chest X-ray Clear/Not  Ordered     [x]  Chronic Changes  Results Pending  []  Infiltrates, atelectasis, pleural effusion, or edema  []  Infiltrates in more than one lobe []  Infiltrate + Atelectasis, &/or pleural effusion  []    Respiratory Pattern Regular,  RR = 12-20 [x]  Increased,  RR = 21-25 []  BAUMAN, irregular,  or RR = 26-30 []  Decreased FEV1  or RR = 31-35 []  Severe SOB, use  of accessory muscles, or RR ? 35  []    Mental Status Alert, oriented,  Cooperative [x]  Confused but Follows commands []  Lethargic or unable to follow commands []  Obtunded  []  Comatose  []    Breath Sounds Clear to  auscultation  [x]  Decreased unilaterally or  in bases only []  Decreased  bilaterally  []  Crackles or intermittent wheezes []  Wheezes []    Cough Strong, Spontan., & nonproductive [x]  Strong,  spontaneous, &  productive []  Weak,  Nonproductive []  Weak, productive or  with wheezes []  No spontaneous  cough or may require suctioning []    Level of Activity Ambulatory []  Ambulatory w/ Assist  [x]  Non-ambulatory []  Paraplegic []  Quadriplegic []    Total    Score:____3___     Triage Score:_____5___      Tri       Triage:     1. (>20) Freq: Q3    2. (16-20) Freq: Q4   3. (11-15) Freq: QID & Albuterol Q2 PRN    4. (6-10) Freq: TID & Albuterol Q2 PRN    5. (0-5) Freq Q4prn

## 2022-05-25 NOTE — CONSULTS
Subjective:      Patient ID: Aries Paredes is a 65 y.o. male who presents today for syncope    HPI 65-year-old right-handed gentleman admitted with the possibility of a brief loss of consciousness. Patient is able to give me a good history and reports that he was supposed to have blood test done yesterday and so he did not eat. At around 9:00 he took his blood sugar medications and then walked to the [facility] with a cane. He was then returning back and felt somewhat woozy and clammy and he sat down on the bench had. He may have passed out there is family was in attendance soon and there was no session of any major postictal state. He was cold and clammy. His blood sugars are not known at that time. He does take metformin and does not monitor his blood sugars. He does take low-dose prednisone for polymyalgia rheumatica. Patient reports no memory issues. He lives with his family and usually walks to the [facility] with a cane. He has history of small fiber neuropathy with neuropathic pain and therefore uses a cane for him to walk. He is asymptomatic at this time. His only complaints are that he has not had a bowel movement. We had seen him in 2020 and at that time he did not have a stroke. We had seen him for dizziness. We did note that he had some mild cognitive impairment at that time. Carotid ultrasounds were not done at that time. Review of Systems   Constitutional: Negative for fever. HENT: Negative for ear pain, tinnitus and trouble swallowing. Eyes: Negative for photophobia and visual disturbance. Respiratory: Negative for choking and shortness of breath. Cardiovascular: Negative for chest pain and palpitations. Gastrointestinal: Negative for nausea and vomiting. Musculoskeletal: Negative for back pain, gait problem, joint swelling, myalgias, neck pain and neck stiffness. Skin: Negative for color change. Allergic/Immunologic: Negative for food allergies.    Neurological: Negative for the Last Year: Never true   Transportation Needs:     Lack of Transportation (Medical): Not on file    Lack of Transportation (Non-Medical): Not on file   Physical Activity:     Days of Exercise per Week: Not on file    Minutes of Exercise per Session: Not on file   Stress:     Feeling of Stress : Not on file   Social Connections:     Frequency of Communication with Friends and Family: Not on file    Frequency of Social Gatherings with Friends and Family: Not on file    Attends [employer] Services: Not on file    Active Member of [address] [place] [address] [employer] or Organizations: Not on file    Attends Club or Organization Meetings: Not on file    Marital Status: Not on file   Intimate Partner Violence:     Fear of Current or Ex-Partner: Not on file    Emotionally Abused: Not on file    Physically Abused: Not on file    Sexually Abused: Not on file   Housing Stability:     Unable to Pay for Housing in the Last Year: Not on file    Number of [place] in the Last Year: Not on file    Unstable Housing in the Last Year: Not on file     No family history on file.   No Known Allergies  Current Facility-Administered Medications   Medication Dose Route Frequency Provider Last Rate Last Admin    sodium chloride flush 0.9 % injection 5-40 mL  5-40 mL IntraVENous 2 times per day MD Rosales   10 mL at 05/25/22 0941    sodium chloride flush 0.9 % injection 5-40 mL  5-40 mL IntraVENous PRN MD Rosales        0.9 % sodium chloride infusion   IntraVENous PRN MD Rosales        enoxaparin (LOVENOX) injection 40 mg  40 mg SubCUTAneous Daily MD Rosales   40 mg at 05/25/22 0033    ondansetron (ZOFRAN-ODT) disintegrating tablet 4 mg  4 mg Oral Q8H PRN MD Rosales        Or    ondansetron [facility]) injection 4 mg  4 mg IntraVENous Q6H PRN MD Rosales        polyethylene glycol (GLYCOLAX) packet 17 g  17 g Oral Daily PRN MD Rosales        acetaminophen (TYLENOL) tablet 650 mg  650 mg Oral Q6H PRN Madeline Delgado MD        Or    acetaminophen (TYLENOL) suppository 650 mg  650 mg Rectal Q6H PRN Madeline Delgado MD            Objective:   /80   Pulse 72   Temp 98.1 °F (36.7 °C)   Resp 18   Ht 5' 10\" (1.778 m)   Wt 155 lb (70.3 kg)   SpO2 98%   BMI 22.24 kg/m²     Physical Exam  Vitals reviewed. Eyes:      Pupils: Pupils are equal, round, and reactive to light. Cardiovascular:      Rate and Rhythm: Normal rate and regular rhythm. Heart sounds: No murmur heard. Pulmonary:      Effort: Pulmonary effort is normal.      Breath sounds: Normal breath sounds. Abdominal:      General: Bowel sounds are normal.   Musculoskeletal:         General: Normal range of motion. Cervical back: Normal range of motion. Skin:     General: Skin is warm. Neurological:      Mental Status: He is alert and oriented to person, place, and time. Cranial Nerves: No cranial nerve deficit. Sensory: No sensory deficit. Motor: No abnormal muscle tone. Coordination: Coordination normal.      Deep Tendon Reflexes: Reflexes are normal and symmetric. Babinski sign absent on the right side. Babinski sign absent on the left side. Psychiatric:         Mood and Affect: Mood normal.       Patient is areflexic in the lower extremities.   Echocardiogram complete 2D with doppler with color    Result Date: 5/24/2022  Transthoracic Echocardiography Report (TTE)  Demographics   Patient Name    Ziyad Mathews Gender               Male   Patient Number  [id number]       Race                                                   Ethnicity   Visit Number    [id number]      Room Number          W286   Corporate ID                   Date of Study        05/24/2022   Accession       [id number]     Referring Physician  Number   Date of Birth   1945     Sonographer          Sandra Duff   Age             65 year(s)     Interpreting         [facility] Physician            Cardiology                                                      [facility]  Procedure Type of Study   TTE procedure:ECHO COMPLETE 2D W/DOP W/COLOR. Procedure Date Date: 05/24/2022 Start: 02:02 PM Study Location: Portable Technical Quality: Adequate visualization Indications:Syncope. Patient Status: Routine Height: 70 inches Weight: 155 pounds BSA: 1.87 m^2 BMI: 22.24 kg/m^2 BP: 144/91 mmHg  Conclusions   Summary  Technically difficult examination. Grossly Left ventricular ejection fraction is estimated at 60%. Grossly No hemodynamic evidence of significant valve disease  Normal right ventricle systolic pressure. RVSP 27mmHg   Signature   ----------------------------------------------------------------  Electronically signed by Karen Francois(Interpreting physician)  on 05/24/2022 04:41 PM  ----------------------------------------------------------------   Findings  Left Ventricle Left ventricular ejection fraction is estimated at 60%. Right Ventricle Normal right ventricle structure and function. Normal right ventricle systolic pressure. RVSP 27mmHg Left Atrium Normal left atrium. Right Atrium Normal right atrium. Mitral Valve Grossly Normal mitral valve structure and function. Tricuspid Valve Grossly Normal tricuspid valve structure and function. Aortic Valve grossly Normal aortic valve structure and function. Pulmonic Valve The pulmonic valve was not well visualized . Pericardial Effusion No evidence of significant pericardial effusion is noted.  Doppler Measurements:   AV Peak Gradient: 13.48 mmHg           MV Peak E-Wave: 0.54 m/s  AV Mean Gradient: 6.13 mmHg            MV Peak A-Wave: 0.91 m/s  TR Velocity:2.05 m/s  TR Gradient:16.82 mmHg                                         Estimated RAP:10 mmHg                                         RVSP:26.82 mmHg  Valves  Mitral Valve   Peak E-Wave: 0.54 m/s           Peak A-Wave: 0.91 m/s                                  E/A Ratio: 0.59 Peak Gradient: 1.16 mmHg                                  Deceleration Time: 231.4 msec   Aortic Valve   Peak Velocity: 1.84 m/s               Mean Velocity: 1.16 m/s  Peak Gradient: 13.48 mmHg             Mean Gradient: 6.13 mmHg  AV VTI: 40.67 cm   Tricuspid Valve   Estimated RVSP: 26.82 mmHg              Estimated RAP: 10 mmHg  TR Velocity: 2.05 m/s                   TR Gradient: 16.82 mmHg   Pulmonic Valve            Estimated PASP: 26.82 mmHg  Structures  Right Atrium   RA Systolic Pressure: 10 mmHg   Right Ventricle            RV Systolic Pressure: 94.02 mmHg      CT Head WO Contrast    Result Date: 5/24/2022  EXAMINATION: CT HEAD WO CONTRAST, 5/24/2022 8:59 AM CLINICAL HISTORY:  fall this am COMPARISON: Brain CT from 8/17/2022 and January 31, 2019. TECHNIQUE:  Multiple contiguous axial images of the head were obtained from the skull base through the skull vertex without intravenous contrast. Sagittal and coronal 3D reformats have been produced. All CT scans at this facility use dose modulation, iterative reconstruction, and/or weight based dosing when appropriate to reduce radiation dose to as low as reasonably achievable. BRAIN CT FINDINGS: Gray-white matter differentiation is maintained. No acute hemorrhage, mass, mass effect, or midline shift. There is prominence of sulci and ventricles indicating mild global cerebral atrophy and chronic involutional changes. There is a persistent chronic area of encephalomalacia in the periventricular white matter adjacent to the anterior portion of the left lateral ventricle, stable since prior study. The subcortical and periventricular white matter is otherwise within normal limits. The basal ganglia are within normal limits. There are no acute changes or space-occupying lesions in the posterior fossa. The visualized portions of the orbits are within normal limits. The globes are intact.  The imaged portions of the paranasal sinuses are unremarkable. The calvarium is intact. There is no acute intracranial process. CT CHEST WO CONTRAST    Result Date: 5/24/2022  INDICATION: 65-year-old male status post trauma presenting with chest pain and cough. COMPARISON: May 17, 2022. . TECHNIQUE: Multidetector CT imaging of the chest was preformed without administration of intravenous contrast. Coronal and sagittal reformatted images were obtained. Automated dose exposure control was used for this exam. FINDINGS: The thoracic aorta demonstrates unremarkable contours with no evidence of aneurysmal dilatation or arterial dissection. The pulmonary vessels demonstrates unremarkable contours. The cardiac chambers are unremarkable. No evidence of parenchymal contusions or pneumothorax, no evidence of pleural fluid collection is seen. Mild prominence of the bronchovascular and interstitial lung markings is visualized, dependent atelectatic changes visualized in the lower lung fields bilaterally with subtle bullous disease and bronchiectatic changes seen. Emphysematous changes visualized. Degenerative bone changes, no evidence of acute fracture is seen. Limited evaluation of the upper abdomen is unremarkable. No evidence of traumatic chest pathology. Chronic interstitial and emphysematous changes seen.        Lab Results   Component Value Date    WBC 10.5 05/24/2022    RBC 4.15 05/24/2022    HGB 11.4 05/24/2022    HCT 35.3 05/24/2022    MCV 85.0 05/24/2022    MCH 29.1 05/24/2022    MCHC 34.3 05/24/2022    RDW 15.6 05/24/2022     05/24/2022    MPV 10.1 08/09/2017     Lab Results   Component Value Date     05/24/2022    K 4.2 05/24/2022    CL 99 05/24/2022    CO2 19 05/24/2022    BUN 24 05/24/2022    CREATININE 1.1 05/24/2022    CREATININE 1.22 05/24/2022    GFRAA >60 05/24/2022    LABGLOM >60 05/24/2022    GLUCOSE 215 05/24/2022    PROT 7.0 05/24/2022    LABALBU 3.8 05/24/2022    CALCIUM 9.5 05/24/2022    BILITOT 0.4 05/24/2022    ALKPHOS 57 05/24/2022    AST 17 05/24/2022    ALT 24 05/24/2022     Lab Results   Component Value Date    PROTIME 13.7 05/24/2022    INR 1.1 05/24/2022     Lab Results   Component Value Date    ZAZWEEZF46 344 11/05/2020    FOLATE 19.1 11/05/2020    FERRITIN 26 08/09/2017    IRON 34 08/09/2017    TIBC 338 08/09/2017     Lab Results   Component Value Date    TRIG 473 11/05/2020    HDL 35 11/05/2020    LDLCALC see below 11/05/2020    LABVLDL - 12/07/2016     Lab Results   Component Value Date    LABAMPH Neg 05/24/2022    BARBSCNU Neg 05/24/2022    LABBENZ Neg 05/24/2022    LABMETH Neg 05/24/2022    OPIATESCREENURINE Neg 05/24/2022    PHENCYCLIDINESCREENURINE Neg 05/24/2022    ETOH <10 05/24/2022     No results found for: LITHIUM, DILFRTOT, VALPROATE    Assessment:   Syncope or a hypoglycemic event. Patient took his medication without eating yesterday morning and low blood sugars is a consideration now. Patient is on prednisone for polymyalgia rheumatica at a very low-dose of. At this time patient is asymptomatic. There may be a session of some mild cognitive impairment which are noted with his evaluations and he had this in 2020 when we had seen him. He is not dizzy and reports no symptoms at this time. At this time our recommendations are to obtain orthostatic blood pressure recording with a carotid ultrasound as a screening given his underlying minor risk factors for cerebrovascular disease and monitoring of his blood sugars which likely I think is the cause of his symptoms. We keep an eye on this and continue to monitor. Patient has a peripheral neuropathy walks with a cane and I recommended a hemoglobin A1c levels. London Becker MD, Divya Pablo, American Board of Psychiatry & Neurology  Board Certified in Vascular Neurology  Board Certified in Neuromuscular Medicine  Certified in [facility]:

## 2022-05-26 LAB
GLUCOSE BLD-MCNC: 235 MG/DL (ref 70–99)
GLUCOSE BLD-MCNC: 266 MG/DL (ref 70–99)
GLUCOSE BLD-MCNC: 295 MG/DL (ref 70–99)
GLUCOSE BLD-MCNC: 380 MG/DL (ref 70–99)
HBA1C MFR BLD: 9.6 % (ref 4.8–5.9)
PERFORMED ON: ABNORMAL

## 2022-05-26 PROCEDURE — 94640 AIRWAY INHALATION TREATMENT: CPT

## 2022-05-26 PROCEDURE — 83036 HEMOGLOBIN GLYCOSYLATED A1C: CPT

## 2022-05-26 PROCEDURE — G0378 HOSPITAL OBSERVATION PER HR: HCPCS

## 2022-05-26 PROCEDURE — 6360000002 HC RX W HCPCS: Performed by: INTERNAL MEDICINE

## 2022-05-26 PROCEDURE — 2580000003 HC RX 258: Performed by: INTERNAL MEDICINE

## 2022-05-26 PROCEDURE — 36415 COLL VENOUS BLD VENIPUNCTURE: CPT

## 2022-05-26 PROCEDURE — 99233 SBSQ HOSP IP/OBS HIGH 50: CPT | Performed by: PSYCHIATRY & NEUROLOGY

## 2022-05-26 PROCEDURE — 6370000000 HC RX 637 (ALT 250 FOR IP): Performed by: INTERNAL MEDICINE

## 2022-05-26 PROCEDURE — 96372 THER/PROPH/DIAG INJ SC/IM: CPT

## 2022-05-26 PROCEDURE — 94761 N-INVAS EAR/PLS OXIMETRY MLT: CPT

## 2022-05-26 RX ORDER — INSULIN LISPRO 100 [IU]/ML
0-6 INJECTION, SOLUTION INTRAVENOUS; SUBCUTANEOUS NIGHTLY
Status: DISCONTINUED | OUTPATIENT
Start: 2022-05-26 | End: 2022-05-27 | Stop reason: HOSPADM

## 2022-05-26 RX ORDER — INSULIN GLARGINE 100 [IU]/ML
15 INJECTION, SOLUTION SUBCUTANEOUS NIGHTLY
Status: DISCONTINUED | OUTPATIENT
Start: 2022-05-26 | End: 2022-05-27 | Stop reason: HOSPADM

## 2022-05-26 RX ORDER — INSULIN LISPRO 100 [IU]/ML
0.08 INJECTION, SOLUTION INTRAVENOUS; SUBCUTANEOUS
Status: DISCONTINUED | OUTPATIENT
Start: 2022-05-26 | End: 2022-05-27 | Stop reason: HOSPADM

## 2022-05-26 RX ORDER — INSULIN LISPRO 100 [IU]/ML
0-12 INJECTION, SOLUTION INTRAVENOUS; SUBCUTANEOUS
Status: DISCONTINUED | OUTPATIENT
Start: 2022-05-26 | End: 2022-05-27 | Stop reason: HOSPADM

## 2022-05-26 RX ORDER — DEXTROSE MONOHYDRATE 50 MG/ML
100 INJECTION, SOLUTION INTRAVENOUS PRN
Status: DISCONTINUED | OUTPATIENT
Start: 2022-05-26 | End: 2022-05-27 | Stop reason: HOSPADM

## 2022-05-26 RX ADMIN — Medication 10 ML: at 21:51

## 2022-05-26 RX ADMIN — INSULIN LISPRO 4 UNITS: 100 INJECTION, SOLUTION INTRAVENOUS; SUBCUTANEOUS at 18:33

## 2022-05-26 RX ADMIN — POLYETHYLENE GLYCOL 3350 17 G: 17 POWDER, FOR SOLUTION ORAL at 09:42

## 2022-05-26 RX ADMIN — PREDNISONE 10 MG: 10 TABLET ORAL at 09:42

## 2022-05-26 RX ADMIN — INSULIN GLARGINE 15 UNITS: 100 INJECTION, SOLUTION SUBCUTANEOUS at 21:42

## 2022-05-26 RX ADMIN — INSULIN LISPRO 10 UNITS: 100 INJECTION, SOLUTION INTRAVENOUS; SUBCUTANEOUS at 13:26

## 2022-05-26 RX ADMIN — BUDESONIDE 250 MCG: 0.25 SUSPENSION RESPIRATORY (INHALATION) at 13:34

## 2022-05-26 RX ADMIN — Medication 10 ML: at 09:43

## 2022-05-26 RX ADMIN — BUDESONIDE 250 MCG: 0.25 SUSPENSION RESPIRATORY (INHALATION) at 19:46

## 2022-05-26 RX ADMIN — INSULIN LISPRO 3 UNITS: 100 INJECTION, SOLUTION INTRAVENOUS; SUBCUTANEOUS at 21:43

## 2022-05-26 RX ADMIN — ENOXAPARIN SODIUM 40 MG: 100 INJECTION SUBCUTANEOUS at 21:42

## 2022-05-26 RX ADMIN — INSULIN LISPRO 6 UNITS: 100 INJECTION, SOLUTION INTRAVENOUS; SUBCUTANEOUS at 13:26

## 2022-05-26 RX ADMIN — INSULIN LISPRO 6 UNITS: 100 INJECTION, SOLUTION INTRAVENOUS; SUBCUTANEOUS at 18:34

## 2022-05-26 ASSESSMENT — ENCOUNTER SYMPTOMS
SHORTNESS OF BREATH: 0
DIARRHEA: 0
TROUBLE SWALLOWING: 0
VOICE CHANGE: 0
COUGH: 0
CONSTIPATION: 0
COLOR CHANGE: 0
NAUSEA: 0
SORE THROAT: 0
ABDOMINAL PAIN: 0
VOMITING: 0
BACK PAIN: 0

## 2022-05-26 NOTE — PROGRESS NOTES
difficulty with swallowing, weakness, numbness, pain, nausea, vomiting, choking, neck pain, dizziness  Patient doing well. Further episodes of syncope. She remains asymptomatic. Carotid ultrasounds were ordered and done and shows progression of disease up to 80% now on the right internal carotid artery. We is asymptomatic  Vitals:    05/26/22 0728   BP: 129/76   Pulse: 79   Resp: 17   Temp: 97.9 °F (36.6 °C)   SpO2: 93%      Review of Systems   Constitutional: Negative for appetite change and fever. HENT: Negative for drooling, ear pain, sore throat, trouble swallowing and voice change. Respiratory: Negative for cough and shortness of breath. Cardiovascular: Negative for chest pain. Gastrointestinal: Negative for abdominal pain, constipation, diarrhea, nausea and vomiting. Genitourinary: Negative for decreased urine volume and dysuria. Musculoskeletal: Negative for arthralgias and back pain. Skin: Negative for color change. Neurological: Negative for dizziness, weakness, light-headedness and headaches. Psychiatric/Behavioral: Negative for agitation and behavioral problems. All other systems reviewed and are negative. Physical Exam  Vitals and nursing note reviewed. Constitutional:       General: He is not in acute distress. Appearance: Normal appearance. He is well-developed. HENT:      Head: Normocephalic and atraumatic. Nose: Nose normal.      Mouth/Throat:      Mouth: Mucous membranes are moist.   Eyes:      General:         Right eye: No discharge. Left eye: No discharge. Conjunctiva/sclera: Conjunctivae normal.   Neck:      Vascular: No JVD. Trachea: No tracheal deviation. Cardiovascular:      Rate and Rhythm: Normal rate. Pulmonary:      Effort: Pulmonary effort is normal.      Breath sounds: Normal breath sounds. Abdominal:      General: Bowel sounds are normal.      Palpations: Abdomen is soft.    Musculoskeletal:         General: Normal range of motion. Cervical back: Normal range of motion and neck supple. No rigidity. No muscular tenderness. Lymphadenopathy:      Cervical: No cervical adenopathy. Skin:     General: Skin is warm and dry. Capillary Refill: Capillary refill takes less than 2 seconds. Neurological:      Mental Status: He is alert and oriented to person, place, and time. Psychiatric:         Mood and Affect: Mood normal.         Behavior: Behavior normal.       Same as noted above and nonfocal        Medications:  Reviewed    Infusion Medications:    sodium chloride       Scheduled Medications:    budesonide  0.25 mg Nebulization BID    predniSONE  10 mg Oral Daily    sodium chloride flush  10 mL IntraVENous BID    sodium chloride flush  5-40 mL IntraVENous 2 times per day    enoxaparin  40 mg SubCUTAneous Daily     PRN Meds: albuterol, ipratropium-albuterol, sodium chloride flush, sodium chloride, ondansetron **OR** ondansetron, polyethylene glycol, acetaminophen **OR** acetaminophen    Labs:   Recent Labs     05/24/22  0830 05/24/22  1010   WBC 10.5  --    HGB 12.1* 11.4*   HCT 35.3*  --      --      Recent Labs     05/24/22  0845 05/24/22  1010     --    K 4.2  --    CL 99  --    CO2 19*  --    BUN 24*  --    CREATININE 1.22* 1.1   CALCIUM 9.5  --      Recent Labs     05/24/22  0845   AST 17   ALT 24   BILITOT 0.4   ALKPHOS 57     Recent Labs     05/24/22  0845   INR 1.1     Recent Labs     05/24/22  0845 05/24/22  1350 05/24/22  1807   CKTOTAL 31  --   --    TROPONINI <0.010 <0.010 <0.010       Urinalysis:   Lab Results   Component Value Date    NITRU Negative 05/24/2022    WBCUA 0-2 05/24/2022    BACTERIA Negative 05/24/2022    RBCUA 3-5 05/24/2022    BLOODU Negative 05/24/2022    SPECGRAV 1.019 05/24/2022    GLUCOSEU Negative 05/24/2022       Radiology:   Most recent    EEG No valid procedures specified. MRI of Brain No results found for this or any previous visit.   Results for orders placed during the hospital encounter of 11/20/20    MRI BRAIN WO CONTRAST    Narrative  MRI of the brain without contrast.    HISTORY: Dizziness and memory loss. COMPARISON: 1/31/2019 noncontrast CT brain. TECHNIQUE: Sagittal T1. Axial T1, FLAIR, T2, susceptibility weighted, and diffusion-weighted images. Coronal T2. 3 plane T2 localizer. FINDINGS:    12 x 4.9 mm in maximum transverse dimension by just under 2 cm in craniocaudal extent encephalomalacia with surrounding mild long TR sequence hyperintensity compatible with remote infarct. Infarct extends inferiorly to superior from the left lentiform  nucleus-external capsule margin where it has a very thin diameter, to the anterior limb internal capsule, then into the adjacent anterior periventricular white matter where it has the greatest diameter. Mild bilateral periventricular and occasional juxtacortical white matter long TR sequence hyperintensities compatible with chronic degenerative, small vessel ischemic, metabolic or less likely primary demyelinating disease most pronounced in the left  frontal periventricular white matter just superior to the area of remote infarction. Probable inferior right frontal prominent perivascular space with surrounding chronic white matter changes versus less likely remote lacunar infarct. No restricted diffusion to indicate acute infarct. Susceptibility weighted images demonstrate a likely degenerative CT occult 4 mm round right anterior caudate head area of calcification and/or iron deposition. No associated mass effect or abnormal signal on any other sequence. Physiologic pineal, and  lateral ventricular choroid calcifications. Mild predominantly frontal atrophy. No ventricular dilatation out of proportion to the amount of atrophy. No extra-axial collections. Orbits, pituitary gland, cerebellopontine angles, and internal auditory canals are unremarkable for the noncontrast technique.  No cerebellar tonsillar ectopia. Slight irregularity of the middle cerebral artery signal void, either partial volume averaging or mild atherosclerotic changes. Ethmoid sinus bilateral mild mucosal thickening. Minimal inferior right frontal mucosal thickening. No air-fluid levels to indicate acute sinusitis. Minimal rightward deviation nasal septum. Mastoid air cells relatively well aerated. Impression  Remote left frontal deep white matter and adjacent lentiform nucleus infarct. Mild chronic white matter changes detailed above. No acute or subacute infarct. Nonspecific right caudate head asymmetric mineralization on susceptibility weighted images detailed above. MRA of the Head and Neck: No results found for this or any previous visit. No results found for this or any previous visit. No results found for this or any previous visit. CT of the Head: Results for orders placed during the hospital encounter of 05/24/22    CT Head WO Contrast    Narrative  EXAMINATION: CT HEAD WO CONTRAST, 5/24/2022 8:59 AM    CLINICAL HISTORY:  fall this am    COMPARISON: Brain CT from 8/17/2022 and January 31, 2019. TECHNIQUE:  Multiple contiguous axial images of the head were obtained from the skull base through the skull vertex without intravenous contrast.  Sagittal and coronal 3D reformats have been produced. All CT scans at this facility use dose modulation, iterative reconstruction, and/or weight based dosing when appropriate to reduce radiation dose to as low as reasonably achievable. BRAIN CT FINDINGS:    Gray-white matter differentiation is maintained. No acute hemorrhage, mass, mass effect, or midline shift. There is prominence of sulci and ventricles indicating mild global cerebral atrophy and chronic involutional changes.  There is a persistent chronic area of encephalomalacia in the periventricular white matter adjacent to the anterior portion of the left  lateral ventricle, stable since prior study. The subcortical and periventricular white matter is otherwise within normal limits. The basal ganglia are within normal limits. There are no acute changes or space-occupying lesions in the posterior fossa. The visualized portions of the orbits are within normal limits. The globes are intact. The imaged portions of the paranasal sinuses are unremarkable. The calvarium is intact. Impression  There is no acute intracranial process. No results found for this or any previous visit. No results found for this or any previous visit. Carotid duplex: No results found for this or any previous visit. No results found for this or any previous visit. Results for orders placed during the hospital encounter of 05/24/22    US CAROTID ARTERY BILATERAL    Narrative  EXAMINATION: CAROTID ULTRASOUND    CLINICAL HISTORY: 69-year-old who presents with vertigo and syncope. Prior smoking history. FINDINGS: Duplex and color Doppler ultrasound were performed of the bilateral extracranial carotid systems. Velocity criteria and internal carotid artery stenoses estimates are extrapolated from recommended modification of the SRU Consensus Conference  criteria for internal carotid artery stenosis -October 2021. Comparison is made with a previous carotid ultrasound 11/20/2020    RIGHT CAROTID SYSTEM: There is intimal thickening in the common carotid artery with a moderate amount of heterogeneous calcified plaque in the bulb extending into the proximal ICA. Velocities in the ICA are again significantly elevated up to 356 cm/s. There is also an elevated ICA/CCA velocity ratio on today's examination. Velocities in the ICA range from 356 cm/s proximal aspect, 169 cm/s mid aspect to 80 cm/s distal aspect. ICA/CCA velocity ratio is 4.3. By ultrasound criteria there is greater than  70%  diameter reduction of the right ICA.  Peak systolic velocities in the proximal ECA and mid CCA are 121 and 83cm/s. There is antegrade flow in the right vertebral artery. LEFT CAROTID SYSTEM:  There is intimal thickening in the common carotid artery with a small amount of hypoechoic plaque in the mid common carotid artery and some scattered calcified plaque throughout the common carotid artery with moderate amount of  calcified plaque in the bulb. There are no elevations of peak systolic velocities or ICA/CCA velocity ratios. Velocities in the ICA range from 146 cm/s proximal aspect, 91 cm/s mid aspect to 64 cm/s distal aspect. ICA/CCA velocity ratio is 0.8. By  ultrasound criteria there is less than 50 percent diameter reduction of the left ICA. Peak systolic velocities in the proximal ECA and mid CCA are 157 and 178cm/s. There is antegrade flow in the left vertebral artery. Impression  ABNORMAL CAROTID ULTRASOUND. AGAIN DEMONSTRATED ARE SIGNIFICANT ELEVATION OF PEAK SYSTOLIC VELOCITIES IN THE RIGHT ICA. NOW THERE IS ALSO AN INCREASED ICA/CCA VELOCITY RATIO AT 4.3. BY ULTRASOUND CRITERIA THERE IS GREATER THAN 70% DIAMETER  REDUCTION OF THE RIGHT ICA BUT LESS THAN NEAR OCCLUSION. CONSIDER CORRELATION WITH CT ANGIOGRAPHY IF CLINICALLY INDICATED. LESS THAN 50% DIAMETER REDUCTION OF THE LEFT ICA. ANTEGRADE VERTEBRAL FLOW    . Echo No results found for this or any previous visit. Assessment/Plan:  5/25/22  Syncope or a hypoglycemic event. Patient took his medication metformin without eating yesterday morning and low blood sugars is a consideration now. Patient is on prednisone for polymyalgia rheumatica at a very low dose. At this time patient is asymptomatic. There may be a session of small mild cognitive impairment which is noted with his evaluations and he had this in 2020 when he had seen him. He is not dizzy and reports no symptoms at this time.   At this time my recommendations are to obtain orthostatic blood pressure recordings with a carotid ultrasound as a screening giving us underlining minor risk factors for cerebrovascular disease and monitoring of his blood sugars which likely I think is the cause of his symptoms. We will keep an eye on this and continue to monitor. Patient has a peripheral neuropathy walks with a cane and I recommended hemoglobin A1c.      5/26/22  Patient wake, alert and oriented x3. His symptoms have resolved. Orthostatic blood pressure and heart rate readings reviewed and are negative. Hemoglobin A1c 9.3. This is elevated from prior hemoglobin A1c on March 24, 2022 of 7.6. Bilateral carotid artery ultrasound completed showing abnormal carotid ultrasound of the right internal carotid. There is greater than 70% diameter reduction of the right ICA but less than near occlusion. CT a neck with and without contrast was completed after results of carotid ultrasound. CTA of the neck shows patient to have short segment of high-grade stenosis involving the right internal carotid artery near the carotid bifurcation measuring at least 80%. Vascular consult pending for evaluation of carotid endarterectomy versus stenting. I have personally performed a face to face diagnostic evaluation on this patient, reviewed all data and investigations, and am the sole provider of all clinical decisions on the neurological status of this patient. Them noted above and nonfocal.  Carotid ultrasounds were completed and compared to the one done in 2020. He has increasing stenosis of 80% and will require some form of intervention. We will consult vascular surgery. London Vieyra MD, Sudha Pitt American Board of Psychiatry & Neurology  Board Certified in Vascular Neurology  Board Certified in Neuromuscular Medicine  Certified in Neurorehabilitation                 Collaborating physicians: Dr Dia Vieyra    Electronically signed by Fredo STEPHANIA Pelayo CNP on 5/26/2022 at 11:40 AM

## 2022-05-26 NOTE — PLAN OF CARE
Pt remains free from falls this shift. Pt is able to verbalize needs without difficulty. Pt has hx of falling at home. Bed and chair alarm on at all times. Pt safety maintained.

## 2022-05-26 NOTE — PROGRESS NOTES
Hospitalist Progress Note      Date of Admission: 5/24/2022  Chief Complaint:    Chief Complaint   Patient presents with    Fall     Subjective:  No new complaints.   No nausea, vomiting, chest pain, or headache      Medications:    Infusion Medications    dextrose      sodium chloride       Scheduled Medications    insulin glargine  15 Units SubCUTAneous Nightly    insulin lispro  0.08 Units/kg SubCUTAneous TID WC    insulin lispro  0-12 Units SubCUTAneous TID WC    insulin lispro  0-6 Units SubCUTAneous Nightly    budesonide  0.25 mg Nebulization BID    predniSONE  10 mg Oral Daily    sodium chloride flush  10 mL IntraVENous BID    sodium chloride flush  5-40 mL IntraVENous 2 times per day    enoxaparin  40 mg SubCUTAneous Daily     PRN Meds: glucose, dextrose bolus **OR** dextrose bolus, glucagon (rDNA), dextrose, albuterol, ipratropium-albuterol, sodium chloride flush, sodium chloride, ondansetron **OR** ondansetron, polyethylene glycol, acetaminophen **OR** acetaminophen    Intake/Output Summary (Last 24 hours) at 5/26/2022 1456  Last data filed at 5/25/2022 1843  Gross per 24 hour   Intake 490 ml   Output --   Net 490 ml     Exam:  /76   Pulse 79   Temp 97.9 °F (36.6 °C) (Oral)   Resp 17   Ht 5' 10\" (1.778 m)   Wt 155 lb (70.3 kg)   SpO2 93%   BMI 22.24 kg/m²   Head: Normocephalic, atraumatic  Sclera clear  Neck JVD flat  Lungs: normal effort of breathing    Labs:   Recent Labs     05/24/22  0830 05/24/22  1010   WBC 10.5  --    HGB 12.1* 11.4*   HCT 35.3*  --      --      Recent Labs     05/24/22  0845 05/24/22  1010     --    K 4.2  --    CL 99  --    CO2 19*  --    BUN 24*  --    CREATININE 1.22* 1.1   CALCIUM 9.5  --    AST 17  --    ALT 24  --    BILITOT 0.4  --    ALKPHOS 57  --      Recent Labs     05/24/22  0845   INR 1.1     Recent Labs     05/24/22  0845 05/24/22  1350 05/24/22  1807   CKTOTAL 31  --   --    TROPONINI <0.010 <0.010 <0.010     Radiology:  CTA NECK W WO CONTRAST   Final Result      Short segment of high-grade stenosis involving the right internal carotid artery near the carotid bifurcation measuring at least 80%. US CAROTID ARTERY BILATERAL   Final Result   ABNORMAL CAROTID ULTRASOUND. AGAIN DEMONSTRATED ARE SIGNIFICANT ELEVATION OF PEAK SYSTOLIC VELOCITIES IN THE RIGHT ICA. NOW THERE IS ALSO AN INCREASED ICA/CCA VELOCITY RATIO AT 4.3. BY ULTRASOUND CRITERIA THERE IS GREATER THAN 70% DIAMETER    REDUCTION OF THE RIGHT ICA BUT LESS THAN NEAR OCCLUSION. CONSIDER CORRELATION WITH CT ANGIOGRAPHY IF CLINICALLY INDICATED. LESS THAN 50% DIAMETER REDUCTION OF THE LEFT ICA. ANTEGRADE VERTEBRAL FLOW      . CT CHEST WO CONTRAST   Final Result   No evidence of traumatic chest pathology. Chronic interstitial and emphysematous changes seen. CT Head WO Contrast   Final Result      There is no acute intracranial process. Assessment/Plan:    Syncope: abnormal US carotid. dw neurology. CTA neck. Severe carotid disease, >80%. Case dw vascular surgery. Neuro following. DM: monitor glucose accordion. Do not restart metformin given recent contrast expose. lantus and ISS ordered. Normotensive    Being treated outpt for polymyalgia rheumatica, to meet with rheumatology outpt next month.      35 minutes total care time, >1/2 in unit/floor time and care coordination       Lorrie Machuca MD ,MD

## 2022-05-27 VITALS
RESPIRATION RATE: 16 BRPM | DIASTOLIC BLOOD PRESSURE: 68 MMHG | TEMPERATURE: 98.8 F | HEART RATE: 91 BPM | SYSTOLIC BLOOD PRESSURE: 126 MMHG | OXYGEN SATURATION: 92 % | BODY MASS INDEX: 22.19 KG/M2 | HEIGHT: 70 IN | WEIGHT: 155 LBS

## 2022-05-27 LAB
GLUCOSE BLD-MCNC: 213 MG/DL (ref 70–99)
GLUCOSE BLD-MCNC: 247 MG/DL (ref 70–99)
PERFORMED ON: ABNORMAL
PERFORMED ON: ABNORMAL

## 2022-05-27 PROCEDURE — 1210000000 HC MED SURG R&B

## 2022-05-27 PROCEDURE — 2580000003 HC RX 258: Performed by: INTERNAL MEDICINE

## 2022-05-27 PROCEDURE — 94760 N-INVAS EAR/PLS OXIMETRY 1: CPT

## 2022-05-27 PROCEDURE — 6360000002 HC RX W HCPCS: Performed by: INTERNAL MEDICINE

## 2022-05-27 PROCEDURE — 94640 AIRWAY INHALATION TREATMENT: CPT

## 2022-05-27 PROCEDURE — 6370000000 HC RX 637 (ALT 250 FOR IP): Performed by: INTERNAL MEDICINE

## 2022-05-27 RX ORDER — ASPIRIN 81 MG/1
81 TABLET ORAL DAILY
Qty: 30 TABLET | Refills: 0 | Status: SHIPPED | OUTPATIENT
Start: 2022-05-27 | End: 2022-06-09 | Stop reason: ALTCHOICE

## 2022-05-27 RX ORDER — CLOPIDOGREL BISULFATE 75 MG/1
75 TABLET ORAL DAILY
Qty: 30 TABLET | Refills: 1 | Status: SHIPPED | OUTPATIENT
Start: 2022-05-27

## 2022-05-27 RX ADMIN — INSULIN LISPRO 6 UNITS: 100 INJECTION, SOLUTION INTRAVENOUS; SUBCUTANEOUS at 08:49

## 2022-05-27 RX ADMIN — INSULIN LISPRO 4 UNITS: 100 INJECTION, SOLUTION INTRAVENOUS; SUBCUTANEOUS at 08:49

## 2022-05-27 RX ADMIN — PREDNISONE 10 MG: 10 TABLET ORAL at 08:30

## 2022-05-27 RX ADMIN — INSULIN LISPRO 4 UNITS: 100 INJECTION, SOLUTION INTRAVENOUS; SUBCUTANEOUS at 13:43

## 2022-05-27 RX ADMIN — INSULIN LISPRO 6 UNITS: 100 INJECTION, SOLUTION INTRAVENOUS; SUBCUTANEOUS at 13:43

## 2022-05-27 RX ADMIN — BUDESONIDE 250 MCG: 0.25 SUSPENSION RESPIRATORY (INHALATION) at 07:25

## 2022-05-27 RX ADMIN — Medication 10 ML: at 08:30

## 2022-05-27 RX ADMIN — Medication 10 ML: at 08:31

## 2022-05-27 NOTE — PROGRESS NOTES
Patient discharged per orders. Patient and daughter given discharge and follow up orders. Able to verbalize discharge instructions.

## 2022-05-31 ENCOUNTER — OFFICE VISIT (OUTPATIENT)
Dept: INTERNAL MEDICINE | Age: 77
End: 2022-05-31
Payer: MEDICARE

## 2022-05-31 ENCOUNTER — HOSPITAL ENCOUNTER (OUTPATIENT)
Dept: DATA CONVERSION | Facility: HOSPITAL | Age: 77
Discharge: HOME | End: 2022-06-03
Attending: HOSPITALIST

## 2022-05-31 VITALS
SYSTOLIC BLOOD PRESSURE: 132 MMHG | OXYGEN SATURATION: 92 % | BODY MASS INDEX: 21.19 KG/M2 | WEIGHT: 148 LBS | DIASTOLIC BLOOD PRESSURE: 78 MMHG | HEART RATE: 75 BPM | HEIGHT: 70 IN

## 2022-05-31 DIAGNOSIS — R06.02 SOB (SHORTNESS OF BREATH): ICD-10-CM

## 2022-05-31 DIAGNOSIS — R42 DIZZINESS AND GIDDINESS: ICD-10-CM

## 2022-05-31 DIAGNOSIS — I65.29 STENOSIS OF CAROTID ARTERY, UNSPECIFIED LATERALITY: Primary | ICD-10-CM

## 2022-05-31 DIAGNOSIS — I65.23 OCCLUSION AND STENOSIS OF BILATERAL CAROTID ARTERIES: Primary | ICD-10-CM

## 2022-05-31 DIAGNOSIS — R93.89 ABNORMAL CAROTID ULTRASOUND: ICD-10-CM

## 2022-05-31 DIAGNOSIS — Z91.81 AT HIGH RISK FOR FALLS: ICD-10-CM

## 2022-05-31 DIAGNOSIS — R53.1 WEAKNESS: ICD-10-CM

## 2022-05-31 DIAGNOSIS — Z09 HOSPITAL DISCHARGE FOLLOW-UP: Primary | ICD-10-CM

## 2022-05-31 LAB
ALANINE AMINOTRANSFERASE (SGPT) (U/L) IN SER/PLAS: 23 U/L (ref 10–52)
ALBUMIN (G/DL) IN SER/PLAS: 3.6 G/DL (ref 3.4–5)
ALKALINE PHOSPHATASE (U/L) IN SER/PLAS: 48 U/L (ref 33–136)
ANION GAP IN SER/PLAS: 15 MMOL/L (ref 10–20)
APPEARANCE, URINE: ABNORMAL
ASPARTATE AMINOTRANSFERASE (SGOT) (U/L) IN SER/PLAS: 18 U/L (ref 9–39)
BACTERIA, URINE: ABNORMAL /HPF
BASOPHILS (10*3/UL) IN BLOOD BY AUTOMATED COUNT: 0.04 X10E9/L (ref 0–0.1)
BASOPHILS/100 LEUKOCYTES IN BLOOD BY AUTOMATED COUNT: 0.6 % (ref 0–2)
BILIRUBIN TOTAL (MG/DL) IN SER/PLAS: 0.6 MG/DL (ref 0–1.2)
BILIRUBIN, URINE: NEGATIVE
BLOOD, URINE: NEGATIVE
CALCIUM (MG/DL) IN SER/PLAS: 8.9 MG/DL (ref 8.6–10.3)
CARBON DIOXIDE, TOTAL (MMOL/L) IN SER/PLAS: 24 MMOL/L (ref 21–32)
CHLORIDE (MMOL/L) IN SER/PLAS: 98 MMOL/L (ref 98–107)
COLOR, URINE: ABNORMAL
CREATININE (MG/DL) IN SER/PLAS: 1.23 MG/DL (ref 0.5–1.3)
EOSINOPHILS (10*3/UL) IN BLOOD BY AUTOMATED COUNT: 0.6 X10E9/L (ref 0–0.4)
EOSINOPHILS/100 LEUKOCYTES IN BLOOD BY AUTOMATED COUNT: 8.7 % (ref 0–6)
ERYTHROCYTE DISTRIBUTION WIDTH (RATIO) BY AUTOMATED COUNT: 14.2 % (ref 11.5–14.5)
ERYTHROCYTE MEAN CORPUSCULAR HEMOGLOBIN CONCENTRATION (G/DL) BY AUTOMATED: 33.5 G/DL (ref 32–36)
ERYTHROCYTE MEAN CORPUSCULAR VOLUME (FL) BY AUTOMATED COUNT: 87 FL (ref 80–100)
ERYTHROCYTES (10*6/UL) IN BLOOD BY AUTOMATED COUNT: 4.03 X10E12/L (ref 4.5–5.9)
FLU A RESULT: NOT DETECTED
FLU B RESULT: NOT DETECTED
GFR MALE: 60 ML/MIN/1.73M2
GLUCOSE (MG/DL) IN SER/PLAS: 189 MG/DL (ref 74–99)
GLUCOSE, URINE: ABNORMAL MG/DL
HEMATOCRIT (%) IN BLOOD BY AUTOMATED COUNT: 34.9 % (ref 41–52)
HEMOGLOBIN (G/DL) IN BLOOD: 11.7 G/DL (ref 13.5–17.5)
HYALINE CASTS, URINE: ABNORMAL /LPF
IMMATURE GRANULOCYTES/100 LEUKOCYTES IN BLOOD BY AUTOMATED COUNT: 3.2 % (ref 0–0.9)
KETONES, URINE: ABNORMAL MG/DL
LEUKOCYTE ESTERASE, URINE: NEGATIVE
LEUKOCYTES (10*3/UL) IN BLOOD BY AUTOMATED COUNT: 6.9 X10E9/L (ref 4.4–11.3)
LYMPHOCYTES (10*3/UL) IN BLOOD BY AUTOMATED COUNT: 1.21 X10E9/L (ref 0.8–3)
LYMPHOCYTES/100 LEUKOCYTES IN BLOOD BY AUTOMATED COUNT: 17.6 % (ref 13–44)
MONOCYTES (10*3/UL) IN BLOOD BY AUTOMATED COUNT: 0.88 X10E9/L (ref 0.05–0.8)
MONOCYTES/100 LEUKOCYTES IN BLOOD BY AUTOMATED COUNT: 12.8 % (ref 2–10)
MUCUS, URINE: ABNORMAL /LPF
NEUTROPHILS (10*3/UL) IN BLOOD BY AUTOMATED COUNT: 3.92 X10E9/L (ref 1.6–5.5)
NEUTROPHILS/100 LEUKOCYTES IN BLOOD BY AUTOMATED COUNT: 57.1 % (ref 40–80)
NITRITE, URINE: NEGATIVE
PH, URINE: 5 (ref 5–8)
PLATELETS (10*3/UL) IN BLOOD AUTOMATED COUNT: 301 X10E9/L (ref 150–450)
POTASSIUM (MMOL/L) IN SER/PLAS: 3.9 MMOL/L (ref 3.5–5.3)
PROTEIN TOTAL: 6.6 G/DL (ref 6.4–8.2)
PROTEIN, URINE: ABNORMAL MG/DL
RBC, URINE: 1 /HPF (ref 0–5)
SARS-COV-2 RESULT: NOT DETECTED
SODIUM (MMOL/L) IN SER/PLAS: 133 MMOL/L (ref 136–145)
SPECIFIC GRAVITY, URINE: 1.02 (ref 1–1.03)
SQUAMOUS EPITHELIAL CELLS, URINE: <1 /HPF
TROPONIN I, HIGH SENSITIVITY: 18 NG/L (ref 0–20)
TROPONIN I, HIGH SENSITIVITY: 18 NG/L (ref 0–20)
TROPONIN I, HIGH SENSITIVITY: 19 NG/L (ref 0–20)
UREA NITROGEN (MG/DL) IN SER/PLAS: 19 MG/DL (ref 6–23)
UROBILINOGEN, URINE: <2 MG/DL (ref 0–1.9)
WBC, URINE: 1 /HPF (ref 0–5)

## 2022-05-31 PROCEDURE — 1123F ACP DISCUSS/DSCN MKR DOCD: CPT | Performed by: PHYSICIAN ASSISTANT

## 2022-05-31 PROCEDURE — 1111F DSCHRG MED/CURRENT MED MERGE: CPT | Performed by: PHYSICIAN ASSISTANT

## 2022-05-31 PROCEDURE — 99214 OFFICE O/P EST MOD 30 MIN: CPT | Performed by: PHYSICIAN ASSISTANT

## 2022-05-31 SDOH — ECONOMIC STABILITY: FOOD INSECURITY: WITHIN THE PAST 12 MONTHS, THE FOOD YOU BOUGHT JUST DIDN'T LAST AND YOU DIDN'T HAVE MONEY TO GET MORE.: NEVER TRUE

## 2022-05-31 SDOH — ECONOMIC STABILITY: FOOD INSECURITY: WITHIN THE PAST 12 MONTHS, YOU WORRIED THAT YOUR FOOD WOULD RUN OUT BEFORE YOU GOT MONEY TO BUY MORE.: NEVER TRUE

## 2022-05-31 ASSESSMENT — PATIENT HEALTH QUESTIONNAIRE - PHQ9
SUM OF ALL RESPONSES TO PHQ QUESTIONS 1-9: 2
SUM OF ALL RESPONSES TO PHQ9 QUESTIONS 1 & 2: 2
1. LITTLE INTEREST OR PLEASURE IN DOING THINGS: 0
SUM OF ALL RESPONSES TO PHQ QUESTIONS 1-9: 2
SUM OF ALL RESPONSES TO PHQ QUESTIONS 1-9: 2
2. FEELING DOWN, DEPRESSED OR HOPELESS: 2
SUM OF ALL RESPONSES TO PHQ QUESTIONS 1-9: 2

## 2022-05-31 ASSESSMENT — SOCIAL DETERMINANTS OF HEALTH (SDOH): HOW HARD IS IT FOR YOU TO PAY FOR THE VERY BASICS LIKE FOOD, HOUSING, MEDICAL CARE, AND HEATING?: NOT HARD AT ALL

## 2022-05-31 NOTE — PROGRESS NOTES
Post-Discharge Transitional Care  Follow Up      Liset Hines   YOB: 1945    Date of Office Visit:  5/31/2022  Date of Hospital Admission: 5/24/22  Date of Hospital Discharge: 5/27/22  Risk of hospital readmission (high >=14%. Medium >=10%) :Readmission Risk Score: 13.4 ( )      Care management risk score Rising risk (score 2-5) and Complex Care (Scores >=6): 1     Non face to face  following discharge, date last encounter closed (first attempt may have been earlier): *No documented post hospital discharge outreach found in the last 14 days    Call initiated 2 business days of discharge: *No response recorded in the last 14 days    ASSESSMENT/PLAN:   Hospital discharge follow-up  -     IN DISCHARGE MEDS RECONCILED W/ CURRENT OUTPATIENT MED LIST  SOB (shortness of breath)  Weakness  At high risk for falls    Decision to send him back to the ER  Squad was called, and he will be taking to [facility]       Medical Decision Making: high complexity  No follow-ups on file. On this date 5/31/2022 I have spent 45 minutes reviewing previous notes, test results and face to face with the patient discussing the diagnosis and importance of compliance with the treatment plan as well as documenting on the day of the visit.        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  He is SOB today, cant speak without struggling   Decision was made to call the squad and send him to [facility]               Patient Active Problem List   Diagnosis    Umbilical hernia without obstruction or gangrene    Type 2 diabetes mellitus without complication, without long-term current use of insulin ([address]    HTN (hypertension)    Hyperlipidemia    PMR (polymyalgia rheumatica) ([address]    Abdominal aortic aneurysm (AAA) without rupture (HCC)    Abnormal ankle brachial index ([place])    PAD (peripheral artery disease) (HCC)    Alzheimer's dementia without behavioral disturbance (HCC)    Aneurysm, common iliac artery (HCC)    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HCC)    Cerebrovascular accident (CVA) [facility]    Ataxic gait    MCI (mild cognitive impairment)    Bilateral carotid artery stenosis    Syncope and collapse    Dizziness    TIA (transient ischemic attack)       Medications listed as ordered at the time of discharge from hospital     Medication List          Accurate as of May 31, 2022 11:59 PM. If you have any questions, ask your nurse or doctor. CONTINUE taking these medications    aspirin EC 81 MG EC tablet  Take 1 tablet by mouth daily     blood glucose test strips strip  Commonly known as: ONE TOUCH TEST STRIPS  Check blood sugar  twice daily As needed. clopidogrel 75 MG tablet  Commonly known as: PLAVIX  Take 1 tablet by mouth daily     GINKOGIN PO     Lancets Misc  Test bid     metFORMIN 500 MG tablet  Commonly known as: GLUCOPHAGE  Take 1 tablet by mouth 2 times daily (with meals)     Misc.  Devices Misc  Custom Orthotics     ondansetron 4 MG disintegrating tablet  Commonly known as: Zofran ODT  Take 1 tablet by mouth every 4-6 hours as needed for Nausea or Vomiting     predniSONE 10 MG tablet  Commonly known as: DELTASONE  Take 1 tablet by mouth daily              Medications marked \"taking\" at this time  Outpatient Medications Marked as Taking for the 5/31/22 encounter (Office Visit) with DARCI Emmanuel   Medication Sig Dispense Refill    metFORMIN (GLUCOPHAGE) 500 MG tablet Take 1 tablet by mouth 2 times daily (with meals) 60 tablet 1    clopidogrel (PLAVIX) 75 MG tablet Take 1 tablet by mouth daily 30 tablet 1    ondansetron (ZOFRAN ODT) 4 MG disintegrating tablet Take 1 tablet by mouth every 4-6 hours as needed for Nausea or Vomiting 8 tablet 0    predniSONE (DELTASONE) 10 MG tablet Take 1 tablet by mouth daily 20 tablet 0    Misc Natural Products (GINKOGIN PO) Take by mouth daily With [facility]. Devices [facility] Custom Orthotics 1 Device 0    blood glucose test strips (ONE TOUCH TEST STRIPS) strip Check blood sugar  twice daily As needed. 300 each 3    Lancets MISC Test bid 300 each 3        Medications patient taking as of now reconciled against medications ordered at time of hospital discharge: Yes    A comprehensive review of systems was negative except for what was noted in the HPI. Objective:    /78 (Site: Left Upper Arm, Position: Sitting, Cuff Size: Medium Adult)   Pulse 75   Ht 5' 10\" (1.778 m)   Wt 148 lb (67.1 kg)   SpO2 92%   BMI 21.24 kg/m²   General Appearance: alert and oriented to person, place and time, well-developed and well-nourished, in no acute distress  Pulmonary/Chest: clear to auscultation bilaterally- no wheezes, rales or rhonchi, normal air movement, no respiratory distress and decreased breath sounds noted- SOB when talking    Cardiovascular: normal rate, normal S1 and S2, no gallops, intact distal pulses and no carotid bruits      An electronic signature was used to authenticate this note.   --DARCI Melara      On the basis of positive falls risk screening, assessment and plan is as follows: admitted to the hospital .

## 2022-06-01 ENCOUNTER — TELEPHONE (OUTPATIENT)
Dept: FAMILY MEDICINE CLINIC | Age: 77
End: 2022-06-01

## 2022-06-01 LAB
POCT GLUCOSE: 156 MG/DL (ref 74–99)
POCT GLUCOSE: 157 MG/DL (ref 74–99)
POCT GLUCOSE: 202 MG/DL (ref 74–99)

## 2022-06-01 NOTE — TELEPHONE ENCOUNTER
Returned on-call page to ER physician at [facility] regarding patient history. Page was received at [facility] on 6-1-22 and call  returned within 5  Minutes. From what I could review from chart, he was recently evaluated for dizziness/etc. Also has had recent hospitalization. Patient history given to the best of my ability. patient to be admitted to [facility]  With plan for additional neurology workup to include MRI. Will  Forward message to PCP.      Electronically signed by STEPHANIA Douglass CNP-CNP, 8:10 AM [unfilled]

## 2022-06-02 LAB
ANION GAP IN SER/PLAS: 13 MMOL/L (ref 10–20)
CALCIUM (MG/DL) IN SER/PLAS: 7.9 MG/DL (ref 8.6–10.3)
CARBON DIOXIDE, TOTAL (MMOL/L) IN SER/PLAS: 25 MMOL/L (ref 21–32)
CHLORIDE (MMOL/L) IN SER/PLAS: 102 MMOL/L (ref 98–107)
CREATININE (MG/DL) IN SER/PLAS: 0.94 MG/DL (ref 0.5–1.3)
ESTIMATED AVERAGE GLUCOSE FOR HBA1C: 223 MG/DL
GFR MALE: 83 ML/MIN/1.73M2
GLUCOSE (MG/DL) IN SER/PLAS: 155 MG/DL (ref 74–99)
HEMOGLOBIN A1C/HEMOGLOBIN TOTAL IN BLOOD: 9.4 %
POCT GLUCOSE: 149 MG/DL (ref 74–99)
POCT GLUCOSE: 152 MG/DL (ref 74–99)
POCT GLUCOSE: 172 MG/DL (ref 74–99)
POCT GLUCOSE: 209 MG/DL (ref 74–99)
POTASSIUM (MMOL/L) IN SER/PLAS: 3.3 MMOL/L (ref 3.5–5.3)
SODIUM (MMOL/L) IN SER/PLAS: 137 MMOL/L (ref 136–145)
UREA NITROGEN (MG/DL) IN SER/PLAS: 11 MG/DL (ref 6–23)

## 2022-06-03 LAB
ANION GAP IN SER/PLAS: 11 MMOL/L (ref 10–20)
CALCIUM (MG/DL) IN SER/PLAS: 8 MG/DL (ref 8.6–10.3)
CARBON DIOXIDE, TOTAL (MMOL/L) IN SER/PLAS: 24 MMOL/L (ref 21–32)
CHLORIDE (MMOL/L) IN SER/PLAS: 107 MMOL/L (ref 98–107)
CREATININE (MG/DL) IN SER/PLAS: 0.96 MG/DL (ref 0.5–1.3)
GFR MALE: 81 ML/MIN/1.73M2
GLUCOSE (MG/DL) IN SER/PLAS: 153 MG/DL (ref 74–99)
POCT GLUCOSE: 149 MG/DL (ref 74–99)
POCT GLUCOSE: 307 MG/DL (ref 74–99)
POTASSIUM (MMOL/L) IN SER/PLAS: 3.3 MMOL/L (ref 3.5–5.3)
SODIUM (MMOL/L) IN SER/PLAS: 139 MMOL/L (ref 136–145)
UREA NITROGEN (MG/DL) IN SER/PLAS: 9 MG/DL (ref 6–23)

## 2022-06-04 LAB
CAMPYLOBACTER GP: NOT DETECTED
NOROVIRUS GI/GII: NOT DETECTED
ROTAVIRUS A: NOT DETECTED
SALMONELLA SP.: NOT DETECTED
SHIGA TOXIN 1: NOT DETECTED
SHIGA TOXIN 2: NOT DETECTED
SHIGELLA SP.: NOT DETECTED
VIBRIO GRP.: NOT DETECTED
YERSINIA ENTEROCOLITICA: NOT DETECTED

## 2022-06-07 LAB — CALPROTECTIN, STOOL: 9 UG/G

## 2022-06-09 ENCOUNTER — OFFICE VISIT (OUTPATIENT)
Dept: INTERNAL MEDICINE | Age: 77
End: 2022-06-09
Payer: MEDICARE

## 2022-06-09 VITALS
HEART RATE: 96 BPM | TEMPERATURE: 97.6 F | OXYGEN SATURATION: 96 % | HEIGHT: 70 IN | WEIGHT: 153 LBS | DIASTOLIC BLOOD PRESSURE: 66 MMHG | BODY MASS INDEX: 21.9 KG/M2 | SYSTOLIC BLOOD PRESSURE: 118 MMHG

## 2022-06-09 DIAGNOSIS — Z09 HOSPITAL DISCHARGE FOLLOW-UP: ICD-10-CM

## 2022-06-09 DIAGNOSIS — R53.1 WEAKNESS: Primary | ICD-10-CM

## 2022-06-09 DIAGNOSIS — E11.9 TYPE 2 DIABETES MELLITUS WITHOUT COMPLICATION, WITHOUT LONG-TERM CURRENT USE OF INSULIN (HCC): ICD-10-CM

## 2022-06-09 PROCEDURE — 3052F HG A1C>EQUAL 8.0%<EQUAL 9.0%: CPT | Performed by: PHYSICIAN ASSISTANT

## 2022-06-09 PROCEDURE — 1111F DSCHRG MED/CURRENT MED MERGE: CPT | Performed by: PHYSICIAN ASSISTANT

## 2022-06-09 PROCEDURE — 1123F ACP DISCUSS/DSCN MKR DOCD: CPT | Performed by: PHYSICIAN ASSISTANT

## 2022-06-09 PROCEDURE — 99214 OFFICE O/P EST MOD 30 MIN: CPT | Performed by: PHYSICIAN ASSISTANT

## 2022-06-09 RX ORDER — GUAIFENESIN 600 MG/1
1200 TABLET, EXTENDED RELEASE ORAL 2 TIMES DAILY
COMMUNITY
End: 2022-06-24 | Stop reason: ALTCHOICE

## 2022-06-09 RX ORDER — ATORVASTATIN CALCIUM 40 MG/1
40 TABLET, FILM COATED ORAL DAILY
COMMUNITY
Start: 2022-06-03 | End: 2022-08-17 | Stop reason: SDUPTHER

## 2022-06-09 NOTE — PROGRESS NOTES
Post-Discharge Transitional Care Follow Up    Duy Sousa   YOB: 1945    Date of Office Visit:  6/9/2022  Date of Hospital Admission: 05/31/22  Date of Hospital Discharge: ?    Care management risk score Rising risk (score 2-5) and Complex Care (Scores >=6): 1     Non face to face  following discharge, date last encounter closed (first attempt may have been earlier): *No documented post hospital discharge outreach found in the last 14 days     Call initiated 2 business days of discharge: *No response recorded in the last 14 days    ASSESSMENT/PLAN:   Hospital discharge follow-up  -     [place] DISCHARGE MEDS RECONCILED W/ CURRENT OUTPATIENT MED LIST  Weakness  Type 2 diabetes mellitus without complication, without long-term current use of insulin ([address]  - doing much better  - getting stronger     Medical Decision Making: high complexity  No follow-ups on file. Subjective:   HPI    Inpatient course: Discharge summary reviewed- see chart.     Patient was seen for SOB and weakaness, sent to the ER   Admitted   Diarrhea is better since stopping Metformin in the hospital   He was supposed to have a scope, but he states the GI specialist never came in to see him   He states he is getting better, getting stronger every day  Daughter got him a scooter, and he is using that to prevent falls, going to the barn to take care of the animals   Daughter is making his diabetic meals, and  his glucose is better          Patient Active Problem List   Diagnosis    Umbilical hernia without obstruction or gangrene    Type 2 diabetes mellitus without complication, without long-term current use of insulin ([address]    HTN (hypertension)    Hyperlipidemia    PMR (polymyalgia rheumatica) ([address]    Abdominal aortic aneurysm (AAA) without rupture ([address]    Abnormal ankle brachial index (ANASTACIA)    PAD (peripheral artery disease) ([address]    Alzheimer's dementia without behavioral disturbance ([address]    Aneurysm, common iliac artery (HCC)    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HCC)    Cerebrovascular accident (CVA) ([facility]    Ataxic gait    MCI (mild cognitive impairment)    Bilateral carotid artery stenosis    Syncope and collapse    Dizziness    TIA (transient ischemic attack)       Medication list at time of discharge reviewed: Yes    Medications marked \"taking\" at this time  Outpatient Medications Marked as Taking for the 6/9/22 encounter (Office Visit) with DARCI Dunaway   Medication Sig Dispense Refill    atorvastatin (LIPITOR) 40 MG tablet Take 40 mg by mouth daily (Patient not taking: Reported on 6/24/2022)      [DISCONTINUED] guaiFENesin (MUCINEX) 600 MG extended release tablet Take 1,200 mg by mouth 2 times daily (Patient not taking: Reported on 6/24/2022)      clopidogrel (PLAVIX) 75 MG tablet Take 1 tablet by mouth daily 30 tablet 1    ondansetron (ZOFRAN ODT) 4 MG disintegrating tablet Take 1 tablet by mouth every 4-6 hours as needed for Nausea or Vomiting 8 tablet 0    [DISCONTINUED] predniSONE (DELTASONE) 10 MG tablet Take 1 tablet by mouth daily (Patient not taking: Reported on 6/24/2022) 20 tablet 0    [DISCONTINUED] Misc Natural Products (GINKOGIN PO) Take by mouth daily With Mentcle (Patient not taking: Reported on 6/24/2022)      Misc. Devices MISC Custom Orthotics 1 Device 0    blood glucose test strips (ONE TOUCH TEST STRIPS) strip Check blood sugar  twice daily As needed. 300 each 3    Lancets MISC Test bid 300 each 3        Medications patient taking as of now reconciled against medications ordered at time of hospital discharge: Yes    Review of Systems   Constitutional: Negative. HENT: Negative. Respiratory: Negative. Cardiovascular: Negative. Gastrointestinal: Negative. Genitourinary: Negative. Neurological: Negative. Psychiatric/Behavioral: Negative.         Objective:    /66 (Site: Left Upper Arm, Position: Sitting, Cuff Size: Medium Adult)   Pulse 96   Temp 97.6 °F (36.4 °C)   Ht 5' 10\" (1.778 m)   Wt 153 lb (69.4 kg)   SpO2 96%   BMI 21.95 kg/m²   Physical Exam  Constitutional:       Appearance: Normal appearance. He is normal weight. Cardiovascular:      Rate and Rhythm: Regular rhythm. Heart sounds: Normal heart sounds. Pulmonary:      Effort: Pulmonary effort is normal.      Breath sounds: Normal breath sounds. Musculoskeletal:      Cervical back: Normal range of motion. Neurological:      General: No focal deficit present. Mental Status: He is alert and oriented to person, place, and time. Psychiatric:         Mood and Affect: Mood normal.         Behavior: Behavior normal.           An electronic signature was used to authenticate this note.   --DARCI Groves

## 2022-06-22 ENCOUNTER — COMMUNITY OUTREACH (OUTPATIENT)
Dept: INTERNAL MEDICINE | Age: 77
End: 2022-06-22

## 2022-06-24 ENCOUNTER — OFFICE VISIT (OUTPATIENT)
Dept: INTERNAL MEDICINE | Age: 77
End: 2022-06-24
Payer: MEDICARE

## 2022-06-24 VITALS
SYSTOLIC BLOOD PRESSURE: 120 MMHG | HEIGHT: 70 IN | HEART RATE: 63 BPM | DIASTOLIC BLOOD PRESSURE: 63 MMHG | OXYGEN SATURATION: 97 % | BODY MASS INDEX: 21.62 KG/M2 | WEIGHT: 151 LBS

## 2022-06-24 DIAGNOSIS — E78.5 HYPERLIPIDEMIA, UNSPECIFIED HYPERLIPIDEMIA TYPE: ICD-10-CM

## 2022-06-24 DIAGNOSIS — Z00.00 MEDICARE ANNUAL WELLNESS VISIT, SUBSEQUENT: Primary | ICD-10-CM

## 2022-06-24 DIAGNOSIS — E11.9 TYPE 2 DIABETES MELLITUS WITHOUT COMPLICATION, WITHOUT LONG-TERM CURRENT USE OF INSULIN (HCC): ICD-10-CM

## 2022-06-24 LAB — HBA1C MFR BLD: 8.2 %

## 2022-06-24 PROCEDURE — 3046F HEMOGLOBIN A1C LEVEL >9.0%: CPT | Performed by: PHYSICIAN ASSISTANT

## 2022-06-24 PROCEDURE — 1123F ACP DISCUSS/DSCN MKR DOCD: CPT | Performed by: PHYSICIAN ASSISTANT

## 2022-06-24 PROCEDURE — G0439 PPPS, SUBSEQ VISIT: HCPCS | Performed by: PHYSICIAN ASSISTANT

## 2022-06-24 RX ORDER — FEXOFENADINE HYDROCHLORIDE 60 MG/1
60 TABLET, FILM COATED ORAL DAILY
COMMUNITY

## 2022-06-24 ASSESSMENT — PATIENT HEALTH QUESTIONNAIRE - PHQ9
5. POOR APPETITE OR OVEREATING: 0
3. TROUBLE FALLING OR STAYING ASLEEP: 0
SUM OF ALL RESPONSES TO PHQ QUESTIONS 1-9: 0
SUM OF ALL RESPONSES TO PHQ9 QUESTIONS 1 & 2: 0
4. FEELING TIRED OR HAVING LITTLE ENERGY: 0
10. IF YOU CHECKED OFF ANY PROBLEMS, HOW DIFFICULT HAVE THESE PROBLEMS MADE IT FOR YOU TO DO YOUR WORK, TAKE CARE OF THINGS AT HOME, OR GET ALONG WITH OTHER PEOPLE: 0
6. FEELING BAD ABOUT YOURSELF - OR THAT YOU ARE A FAILURE OR HAVE LET YOURSELF OR YOUR FAMILY DOWN: 0
1. LITTLE INTEREST OR PLEASURE IN DOING THINGS: 0
2. FEELING DOWN, DEPRESSED OR HOPELESS: 0
7. TROUBLE CONCENTRATING ON THINGS, SUCH AS READING THE NEWSPAPER OR WATCHING TELEVISION: 0
8. MOVING OR SPEAKING SO SLOWLY THAT OTHER PEOPLE COULD HAVE NOTICED. OR THE OPPOSITE, BEING SO FIGETY OR RESTLESS THAT YOU HAVE BEEN MOVING AROUND A LOT MORE THAN USUAL: 0
9. THOUGHTS THAT YOU WOULD BE BETTER OFF DEAD, OR OF HURTING YOURSELF: 0

## 2022-06-24 ASSESSMENT — ENCOUNTER SYMPTOMS
GASTROINTESTINAL NEGATIVE: 1
RESPIRATORY NEGATIVE: 1

## 2022-06-24 ASSESSMENT — LIFESTYLE VARIABLES: HOW OFTEN DO YOU HAVE A DRINK CONTAINING ALCOHOL: NEVER

## 2022-06-24 NOTE — PATIENT INSTRUCTIONS
Personalized Preventive Plan for Alecia Kumari - 6/24/2022  Medicare offers a range of preventive health benefits. Some of the tests and screenings are paid in full while other may be subject to a deductible, co-insurance, and/or copay. Some of these benefits include a comprehensive review of your medical history including lifestyle, illnesses that may run in your family, and various assessments and screenings as appropriate. After reviewing your medical record and screening and assessments performed today your provider may have ordered immunizations, labs, imaging, and/or referrals for you. A list of these orders (if applicable) as well as your Preventive Care list are included within your After Visit Summary for your review. Other Preventive Recommendations:    · A preventive eye exam performed by an eye specialist is recommended every 1-2 years to screen for glaucoma; cataracts, macular degeneration, and other eye disorders. · A preventive dental visit is recommended every 6 months. · Try to get at least 150 minutes of exercise per week or 10,000 steps per day on a pedometer . · Order or download the FREE \"Exercise & Physical Activity: Your Everyday Guide\" from [employer]. Call 9-874.979.4322 or search [employer] online. · You need 1087-1165 mg of calcium and 0463-4216 IU of vitamin D per day. It is possible to meet your calcium requirement with diet alone, but a vitamin D supplement is usually necessary to meet this goal.  · When exposed to the sun, use a sunscreen that protects against both UVA and UVB radiation with an SPF of 30 or greater. Reapply every 2 to 3 hours or after sweating, drying off with a towel, or swimming. · Always wear a seat belt when traveling in a car. Always wear a helmet when riding a bicycle or motorcycle.

## 2022-06-24 NOTE — PROGRESS NOTES
Papa Wharton (:  [id number]) is a 65 y.o. male,Established patient, here for evaluation of the following chief complaint(s):  Diabetes (3 month follow up)         ASSESSMENT/PLAN:  1. Type 2 diabetes mellitus without complication, without long-term current use of insulin ([facility]  2. Hyperlipidemia, unspecified hyperlipidemia type  -  A1C {IMPROVED/NO CHANGE/WORSE:39443} : going from *** to ***  -  Diabetes Counseling : Discussed disease risks, adopting healthy behaviors,  monitoring glucose and bringing logs to visits  - Discussed most recent labs in detail, foot care, and compliance with all medications, and yearly eye exam  - Diet: Addressed ADA, low sodium and low cholesterol diet. - Exercise: discussed need for aerobic exercise 3 times weekly  - Medications:   {Meds adjust:21209} ***          No follow-ups on file. Subjective   SUBJECTIVE/OBJECTIVE:  HPI  Diabetes Mellitus Type 2:   Medication compliance:  compliant all of the time  Current medication regimen: metformin 500 mg BID     Diet compliance:  compliant all of the time  Weight trend: stable  Current exercise: no regular exercise  Current symptoms/problems include none. Home blood sugar records:  fasting range: 131, patient tests 1 time(s) per day  Any episodes of hypoglycemia? no  Tobacco history: He  reports that he quit smoking about 26 years ago. His smoking use included cigarettes. He has a 30.00 pack-year smoking history. He has never used smokeless tobacco.   Daily Aspirin?  No: on Plavix   Known diabetic complications: none      HLD- stable on Lipitor  No side effects       Diabetes and Hypertension Visit Information    BP Readings from Last 3 Encounters:   22 120/63   22 118/66   22 132/78          Hemoglobin A1C (%)   Date Value   2022 9.4 (A)   2022 9.6 (H)   2022 9.3 (H)     Microalbumin, Random Urine (mg/dL)   Date Value   2018 <1.20     LDL Calculated (mg/dL)   Date Value   2020 see below     HDL (mg/dL)   Date Value   11/05/2020 35 (L)     BUN (mg/dL)   Date Value   05/24/2022 24 (H)     CREATININE (mg/dL)   Date Value   06/03/2022 0.96     Glucose (mg/dL)   Date Value   06/03/2022 153 (H)        Patient Care Team:  DARCI Middleton as PCP - General (Physician Assistant)  DARCI Middleton as PCP - [facility]  Medardo Richard MD as Consulting Physician (Rheumatology)  Franck Kline DO as Cardiologist (Cardiology)         Medical History Review  Past Medical, Family, and Social History reviewed and {DOES/NOT:19883} contribute to the patient presenting condition    Health Maintenance   Topic Date Due    Hepatitis C screen  Never done    DTaP/Tdap/Td vaccine (2 - Td or Tdap) 04/18/1973    Shingles vaccine (1 of 2) Never done    Lipids  11/05/2021    COVID-19 Vaccine (3 - Booster for Sandoval Peter series) 02/28/2022    Annual Wellness Visit (AWV)  05/26/2022    Flu vaccine (Season Ended) 09/01/2022    Depression Screen  05/31/2023    Pneumococcal 65+ years Vaccine  Completed    Hepatitis A vaccine  Aged Out    Hib vaccine  Aged Out    Meningococcal (ACWY) vaccine  Aged Out         Review of Systems   Constitutional: Negative. HENT: Negative. Respiratory: Negative. Cardiovascular: Negative. Gastrointestinal: Negative. Endocrine: Negative. Genitourinary: Negative. Musculoskeletal: Negative. Neurological: Negative. Objective   Physical Exam  Vitals reviewed. Constitutional:       Appearance: Normal appearance. HENT:      Head: Normocephalic and atraumatic. Cardiovascular:      Rate and Rhythm: Normal rate and regular rhythm. Heart sounds: Normal heart sounds. Pulmonary:      Effort: Pulmonary effort is normal.      Breath sounds: Normal breath sounds. Musculoskeletal:         General: Normal range of motion. Neurological:      General: No focal deficit present.       Mental Status: He is alert and oriented to person, place, and time. Psychiatric:         Mood and Affect: Mood normal.         Behavior: Behavior normal.            {Time Documentation Optional:[id number]}      An electronic signature was used to authenticate this note.     --DARCI Alaniz

## 2022-06-24 NOTE — PROGRESS NOTES
Medicare Annual Wellness Visit    Romeo Shafer is here for Diabetes (3 month follow up)    Assessment & Plan   Medicare annual wellness visit, subsequent  Type 2 diabetes mellitus without complication, without long-term current use of insulin ([facility]  Hyperlipidemia, unspecified hyperlipidemia type     - recent labs reviewed   - A1c done, much improvement. Continue diet and current meds   - has a scooter        Recommendations for Preventive Services Due: see orders and patient instructions/AVS.  Recommended screening schedule for the next 5-10 years is provided to the patient in written form: see Patient Instructions/AVS.     Return for Medicare Annual Wellness Visit in 1 year. Subjective   The following acute and/or chronic problems were also addressed today:  Dm- A1C much improved  at 8.7  HLD- stable on statin   He is on Plavix       Patient's complete Health Risk Assessment and screening values have been reviewed and are found in Flowsheets. The following problems were reviewed today and where indicated follow up appointments were made and/or referrals ordered.     Positive Risk Factor Screenings with Interventions:    Fall Risk:  Do you feel unsteady or are you worried about falling? : no  2 or more falls in past year?: no  Fall with injury in past year?: (!) yes (fainted and fell)     Fall Risk Interventions:    · Home safety tips provided  · recently got a scooter                [address] and ACP:  General  In general, how would you say your health is?: Good  In the past 7 days, have you experienced any of the following: New or Increased Pain, New or Increased Fatigue, Loneliness, Social Isolation, Stress or Anger?: No  Do you get the social and emotional support that you need?: Yes  Do you have a Living Will?: Yes    Advance Directives     Power of  Living Will ACP-Advance Directive ACP-Power of     Not on File Not on File Not on File Not on [facility] Interventions:  · No Living Will: Patient declines ACP discussion/assistance     Hearing/Vision:  Do you or your family notice any trouble with your hearing that hasn't been managed with hearing aids?: No  Do you have difficulty driving, watching TV, or doing any of your daily activities because of your eyesight?: No (depending on sugar levels)  Have you had an eye exam within the past year?: (!) No  No exam data present            Objective   Vitals:    06/24/22 1310   BP: 120/63   Site: Right Upper Arm   Position: Sitting   Cuff Size: Medium Adult   Pulse: 63   SpO2: 97%   Weight: 151 lb (68.5 kg)   Height: 5' 10\" (1.778 m)      Body mass index is 21.67 kg/m². General Appearance: alert and oriented to person, place and time, well developed and well- nourished, in no acute distress  Skin: warm and dry, no rash or erythema  Head: normocephalic and atraumatic  Eyes: pupils equal, round, and reactive to light, extraocular eye movements intact, conjunctivae normal  ENT: tympanic membrane, external ear and ear canal normal bilaterally, nose without deformity, nasal mucosa and turbinates normal without polyps  Neck: supple and non-tender without mass, no thyromegaly or thyroid nodules, no cervical lymphadenopathy  Pulmonary/Chest: clear to auscultation bilaterally- no wheezes, rales or rhonchi, normal air movement, no respiratory distress  Cardiovascular: normal rate, regular rhythm, normal S1 and S2, no murmurs, rubs, clicks, or gallops, distal pulses intact, no carotid bruits  Abdomen: soft, non-tender, non-distended, normal bowel sounds, no masses or organomegaly  Extremities: no cyanosis, clubbing or edema  Musculoskeletal: normal range of motion, no joint swelling, deformity or tenderness  Neurologic: reflexes normal and symmetric, no cranial nerve deficit, gait, coordination and speech normal       No Known Allergies  Prior to Visit Medications    Medication Sig Taking?  Authorizing Provider   fexofenadine (ALLEGRA ALLERGY) 60 MG tablet Take 60 mg by mouth daily Yes Historical Provider, MD   metFORMIN (GLUCOPHAGE) 500 MG tablet Take 1 tablet by mouth 2 times daily (with meals) Yes Herlinda Dickerson MD   clopidogrel (PLAVIX) 75 MG tablet Take 1 tablet by mouth daily Yes Herlinda Dickerson MD   ondansetron (ZOFRAN ODT) 4 MG disintegrating tablet Take 1 tablet by mouth every 4-6 hours as needed for Nausea or Vomiting Yes Ardine Heading, DO   Misc. Renetta Sprung Yes Kai Alvarez MD   blood glucose test strips (ONE TOUCH TEST STRIPS) strip Check blood sugar  twice daily As needed.  Yes Kai Alvarez MD   Lancets MISC Test bid Yes Kai Alvarez MD   atorvastatin (LIPITOR) 40 MG tablet Take 40 mg by mouth daily  Patient not taking: Reported on 6/24/2022  Historical Provider, MD Bautista (Including outside providers/suppliers regularly involved in providing care):   Patient Care Team:  DARCI Alvarez as PCP - General (Physician Assistant)  DARCI Alvarez as PCP - [address] [place] [address]  [facility] Provider  Charles Mcmillan MD as Consulting Physician (Rheumatology)  Vaibhav Delcid DO as Cardiologist (Cardiology)     Reviewed and updated this visit:  Tobacco  Allergies  Meds  Problems  Med Hx  Surg Hx  Soc Hx  Fam Hx

## 2022-06-26 ASSESSMENT — ENCOUNTER SYMPTOMS
RESPIRATORY NEGATIVE: 1
GASTROINTESTINAL NEGATIVE: 1

## 2022-06-27 NOTE — DISCHARGE SUMMARY
Hospital Medicine Discharge Summary    Mirna Alvarez  :    MRN:  [id number]    Admit date:  2022  Discharge date: 22    Admitting Physician:  Portia Rasmussen MD  Primary Care Physician:  DARCI Nixon    Mirna Alvarez is a 65 y.o. male that was admitted and treated at [facility] for the following medical issues:     Principal Problem:    Syncope and collapse  Active Problems:    TIA (transient ischemic attack)    Dizziness    MCI (mild cognitive impairment)  Resolved Problems:    * No resolved hospital problems. *      Discharge Diagnoses:    Principal Problem:    Syncope and collapse  Active Problems:    TIA (transient ischemic attack)    Dizziness    MCI (mild cognitive impairment)  Resolved Problems:    * No resolved hospital problems. *    Chief Complaint   Patient presents with   [facility] [place] Course:   Mirna Alvarez is a 65 y.o. male who was admitted to the hospital with    Syncope: abnormal US carotid. dw neurology. CTA neck.      Severe carotid disease, >80%. Case dw vascular surgery. Neuro following.      DM: monitor glucose accordion. Do not restart metformin given recent contrast expose. lantus and ISS ordered.      Normotensive     Being treated outpt for polymyalgia rheumatica, to meet with rheumatology outpt next month. Pt was discharge in a stable condition.        /68   Pulse 91   Temp 98.8 °F (37.1 °C) (Oral)   Resp 16   Ht 5' 10\" (1.778 m)   Wt 155 lb (70.3 kg)   SpO2 92%   BMI 22.24 kg/m²     Patient was seen by the following consultants while admitted to [facility]:   Consults:  IP CONSULT TO CARDIOLOGY  IP CONSULT TO NEUROLOGY    Significant Diagnostic Studies:    Refer to chart if  Echocardiogram complete 2D with doppler with color    Result Date: 2022  Transthoracic Echocardiography Report (TTE)  Demographics   Patient Name    Bradd Blocker Gender               Male   Patient Number  [id number] Race                                                   Ethnicity   Visit Number    [id number]      Room Number          W286   Corporate ID                   Date of Study        05/24/2022   Accession       [id number]     Referring Physician  Number   Date of Birth   1945     Sonographer          Welton Brunner   Age             65 year(s)     Interpreting         [facility])                                 Physician            Cardiology                                                      [facility]  Procedure Type of Study   TTE procedure:ECHO COMPLETE 2D W/DOP W/COLOR. Procedure Date Date: 05/24/2022 Start: 02:02 PM Study Location: Portable Technical Quality: Adequate visualization Indications:Syncope. Patient Status: Routine Height: 70 inches Weight: 155 pounds BSA: 1.87 m^2 BMI: 22.24 kg/m^2 BP: 144/91 mmHg  Conclusions   Summary  Technically difficult examination. Grossly Left ventricular ejection fraction is estimated at 60%. Grossly No hemodynamic evidence of significant valve disease  Normal right ventricle systolic pressure. RVSP 27mmHg   Signature   ----------------------------------------------------------------  Electronically signed by Issa Francois(Interpreting physician)  on 05/24/2022 04:41 PM  ----------------------------------------------------------------   Findings  Left Ventricle Left ventricular ejection fraction is estimated at 60%. Right Ventricle Normal right ventricle structure and function. Normal right ventricle systolic pressure. RVSP 27mmHg Left Atrium Normal left atrium. Right Atrium Normal right atrium. Mitral Valve Grossly Normal mitral valve structure and function. Tricuspid Valve Grossly Normal tricuspid valve structure and function. Aortic Valve grossly Normal aortic valve structure and function. Pulmonic Valve The pulmonic valve was not well visualized . Pericardial Effusion No evidence of significant pericardial effusion is noted.  Doppler Measurements: AV Peak Gradient: 13.48 mmHg           MV Peak E-Wave: 0.54 m/s  AV Mean Gradient: 6.13 mmHg            MV Peak A-Wave: 0.91 m/s  TR Velocity:2.05 m/s  TR Gradient:16.82 mmHg                                         Estimated RAP:10 mmHg                                         RVSP:26.82 mmHg  Valves  Mitral Valve   Peak E-Wave: 0.54 m/s           Peak A-Wave: 0.91 m/s                                  E/A Ratio: 0.59                                  Peak Gradient: 1.16 mmHg                                  Deceleration Time: 231.4 msec   Aortic Valve   Peak Velocity: 1.84 m/s               Mean Velocity: 1.16 m/s  Peak Gradient: 13.48 mmHg             Mean Gradient: 6.13 mmHg  AV VTI: 40.67 cm   Tricuspid Valve   Estimated RVSP: 26.82 mmHg              Estimated RAP: 10 mmHg  TR Velocity: 2.05 m/s                   TR Gradient: 16.82 mmHg   Pulmonic Valve            Estimated PASP: 26.82 mmHg  Structures  Right Atrium   RA Systolic Pressure: 10 mmHg   Right Ventricle            RV Systolic Pressure: 88.48 mmHg      CT Head WO Contrast    Result Date: 5/24/2022  EXAMINATION: CT HEAD WO CONTRAST, 5/24/2022 8:59 AM CLINICAL HISTORY:  fall this am COMPARISON: Brain CT from 8/17/2022 and January 31, 2019. TECHNIQUE:  Multiple contiguous axial images of the head were obtained from the skull base through the skull vertex without intravenous contrast. Sagittal and coronal 3D reformats have been produced. All CT scans at this facility use dose modulation, iterative reconstruction, and/or weight based dosing when appropriate to reduce radiation dose to as low as reasonably achievable. BRAIN CT FINDINGS: Gray-white matter differentiation is maintained. No acute hemorrhage, mass, mass effect, or midline shift. There is prominence of sulci and ventricles indicating mild global cerebral atrophy and chronic involutional changes.  There is a persistent chronic area of encephalomalacia in the periventricular white matter adjacent to the anterior portion of the left lateral ventricle, stable since prior study. The subcortical and periventricular white matter is otherwise within normal limits. The basal ganglia are within normal limits. There are no acute changes or space-occupying lesions in the posterior fossa. The visualized portions of the orbits are within normal limits. The globes are intact. The imaged portions of the paranasal sinuses are unremarkable. The calvarium is intact. There is no acute intracranial process. CTA NECK W WO CONTRAST    Result Date: 5/25/2022  EXAMINATION:  CTA NECK W WO CONTRAST HISTORY:   abnormal carotid US. TECHNIQUE:   Spiral high resolution axial images were obtained through the neck and superior mediastinum following bolus administration of intravenous contrast for CT angiography. The data was subsequently post-processed utilizing 3D multi-planar reconstructions, 3D maximum intensity projections. Contrast:  iopamidol (ISOVUE-370) injection of 100 mL All CT scans at this facility use dose modulation, iterative reconstruction, and/or weight based dosing when appropriate to reduce radiation dose to as low as reasonably achievable. COMPARISON: Prior ultrasound earlier today RESULT: NECK: Soft tissues: The soft tissue planes are maintained throughout. No evidence of a soft tissue mass in the neck or superior mediastinum. No significant lymphadenopathy. Spine:  Degenerative changes. Lung apices:   No distinct acute finding but the limitations from motion artifact. Emphysematous changes. CT ARTERIOGRAM:     Extracranial Circulation: Aortic Arch: Normal branching pattern. .  There is no significant stenosis in the proximal brachiocephalic vessels. Carotid Stenosis: Right Common:  No significant stenosis. Right Internal Carotid  Plaque: Moderate plaque formation. Right Internal Carotid Stenosis by NASCET criteria:  Short segment of at least 80% narrowing at the bifurcation.  Left Common:  Segment of narrowing within the mid segment secondary to atherosclerotic plaque measuring around 50%. Left Internal Carotid Plaque: Moderate plaque formation Left Internal Carotid Stenosis by NASCET criteria:  Less than 50% Cervical Vertebral Arteries: Patency:  Bilateral Dominance:  Codominant     Short segment of high-grade stenosis involving the right internal carotid artery near the carotid bifurcation measuring at least 80%. CT CHEST WO CONTRAST    Result Date: 5/24/2022  INDICATION: 79-year-old male status post trauma presenting with chest pain and cough. COMPARISON: May 17, 2022. . TECHNIQUE: Multidetector CT imaging of the chest was preformed without administration of intravenous contrast. Coronal and sagittal reformatted images were obtained. Automated dose exposure control was used for this exam. FINDINGS: The thoracic aorta demonstrates unremarkable contours with no evidence of aneurysmal dilatation or arterial dissection. The pulmonary vessels demonstrates unremarkable contours. The cardiac chambers are unremarkable. No evidence of parenchymal contusions or pneumothorax, no evidence of pleural fluid collection is seen. Mild prominence of the bronchovascular and interstitial lung markings is visualized, dependent atelectatic changes visualized in the lower lung fields bilaterally with subtle bullous disease and bronchiectatic changes seen. Emphysematous changes visualized. Degenerative bone changes, no evidence of acute fracture is seen. Limited evaluation of the upper abdomen is unremarkable. No evidence of traumatic chest pathology. Chronic interstitial and emphysematous changes seen. US CAROTID ARTERY BILATERAL    Result Date: 5/25/2022  EXAMINATION: CAROTID ULTRASOUND CLINICAL HISTORY: 79-year-old who presents with vertigo and syncope. Prior smoking history. FINDINGS: Duplex and color Doppler ultrasound were performed of the bilateral extracranial carotid systems.  Velocity criteria and internal carotid artery stenoses estimates are extrapolated from recommended modification of the SRU Consensus Conference criteria for internal carotid artery stenosis -October 2021. Comparison is made with a previous carotid ultrasound 11/20/2020 RIGHT CAROTID SYSTEM: There is intimal thickening in the common carotid artery with a moderate amount of heterogeneous calcified plaque in the bulb extending into the proximal ICA. Velocities in the ICA are again significantly elevated up to 356 cm/s. There is also an elevated ICA/CCA velocity ratio on today's examination. Velocities in the ICA range from 356 cm/s proximal aspect, 169 cm/s mid aspect to 80 cm/s distal aspect. ICA/CCA velocity ratio is 4.3. By ultrasound criteria there is greater than 70%  diameter reduction of the right ICA. Peak systolic velocities in the proximal ECA and mid CCA are 121 and 83cm/s. There is antegrade flow in the right vertebral artery. LEFT CAROTID SYSTEM:  There is intimal thickening in the common carotid artery with a small amount of hypoechoic plaque in the mid common carotid artery and some scattered calcified plaque throughout the common carotid artery with moderate amount of calcified plaque in the bulb. There are no elevations of peak systolic velocities or ICA/CCA velocity ratios. Velocities in the ICA range from 146 cm/s proximal aspect, 91 cm/s mid aspect to 64 cm/s distal aspect. ICA/CCA velocity ratio is 0.8. By ultrasound criteria there is less than 50 percent diameter reduction of the left ICA. Peak systolic velocities in the proximal ECA and mid CCA are 157 and 178cm/s. There is antegrade flow in the left vertebral artery. ABNORMAL CAROTID ULTRASOUND. AGAIN DEMONSTRATED ARE SIGNIFICANT ELEVATION OF PEAK SYSTOLIC VELOCITIES IN THE RIGHT ICA. NOW THERE IS ALSO AN INCREASED ICA/CCA VELOCITY RATIO AT 4.3. BY ULTRASOUND CRITERIA THERE IS GREATER THAN 70% DIAMETER REDUCTION OF THE RIGHT ICA BUT LESS THAN NEAR OCCLUSION.  CONSIDER CORRELATION WITH CT ANGIOGRAPHY IF CLINICALLY INDICATED. LESS THAN 50% DIAMETER REDUCTION OF THE LEFT ICA. ANTEGRADE VERTEBRAL FLOW . Discharge Medications:         Medication List      CHANGE how you take these medications    clopidogrel 75 MG tablet  Commonly known as: PLAVIX  Take 1 tablet by mouth daily  What changed: See the new instructions. metFORMIN 500 MG tablet  Commonly known as: GLUCOPHAGE  Take 1 tablet by mouth 2 times daily (with meals)  What changed:   · medication strength  · See the new instructions. CONTINUE taking these medications    blood glucose test strips strip  Commonly known as: ONE TOUCH TEST STRIPS  Check blood sugar  twice daily As needed. Lancets Misc  Test bid     Misc. Devices Misc  Custom Orthotics     ondansetron 4 MG disintegrating tablet  Commonly known as: Zofran ODT  Take 1 tablet by mouth every 4-6 hours as needed for Nausea or Vomiting        STOP taking these medications    GINKOGIN PO     indomethacin 50 MG capsule  Commonly known as: INDOCIN     predniSONE 10 MG tablet  Commonly known as: DELTASONE     Tylenol 325 MG Caps  Generic drug: Acetaminophen     UNABLE TO FIND           Where to Get Your Medications      These medications were sent to [address] [place] [address], [facility]  [address]    Phone: [phone]   · clopidogrel 75 MG tablet  · metFORMIN 500 MG tablet           Disposition:   If discharged to Home, Any [place] [id number] needs that were indicated and/or required as been addressed and set up by Social Work. Condition at discharge: Pt was medically stable at the time of discharge. Activity: activity as tolerated    Total time taken for discharging this patient: 40 minutes. Greater than 70% of time was spent focused exclusively on this patient.  Time was taken to review chart, discuss plans with consultants, reconciling medications, discussing plan answering questions with patient.      Signed:  Kailyn Anderson MD

## 2022-08-04 NOTE — TELEPHONE ENCOUNTER
Comments:     Last Office Visit (last PCP visit):   6/24/2022    Next Visit Date:  No future appointments. **If hasn't been seen in over a year OR hasn't followed up according to last diabetes/ADHD visit, make appointment for patient before sending refill to provider.     Rx requested:  Requested Prescriptions     Pending Prescriptions Disp Refills    metFORMIN (GLUCOPHAGE) 1000 MG tablet [Pharmacy Med Name: metFORMIN HCl 1000 MG Oral Tablet] 180 tablet 0     Sig: Take 1 tablet by mouth twice daily

## 2022-08-12 NOTE — TELEPHONE ENCOUNTER
Comments:     Last Office Visit (last PCP visit):   6/24/2022    Next Visit Date:  No future appointments. **If hasn't been seen in over a year OR hasn't followed up according to last diabetes/ADHD visit, make appointment for patient before sending refill to provider.     Rx requested:  Requested Prescriptions      No prescriptions requested or ordered in this encounter

## 2022-08-17 RX ORDER — ATORVASTATIN CALCIUM 40 MG/1
40 TABLET, FILM COATED ORAL DAILY
Qty: 30 TABLET | Refills: 0 | Status: SHIPPED | OUTPATIENT
Start: 2022-08-17 | End: 2022-08-30 | Stop reason: SDUPTHER

## 2022-08-30 RX ORDER — ATORVASTATIN CALCIUM 40 MG/1
40 TABLET, FILM COATED ORAL DAILY
Qty: 90 TABLET | Refills: 1 | Status: SHIPPED | OUTPATIENT
Start: 2022-08-30 | End: 2022-09-26 | Stop reason: SDUPTHER

## 2022-08-30 NOTE — TELEPHONE ENCOUNTER
Comments: Please fill for 90 days for insurance purposes    Last Office Visit (last PCP visit):   6/24/2022    Next Visit Date:  No future appointments. **If hasn't been seen in over a year OR hasn't followed up according to last diabetes/ADHD visit, make appointment for patient before sending refill to provider.     Rx requested:  Requested Prescriptions     Pending Prescriptions Disp Refills    atorvastatin (LIPITOR) 40 MG tablet 30 tablet 0     Sig: Take 1 tablet by mouth daily

## 2022-09-01 ENCOUNTER — TELEPHONE (OUTPATIENT)
Dept: INTERNAL MEDICINE | Age: 77
End: 2022-09-01

## 2022-09-01 RX ORDER — MECLIZINE HYDROCHLORIDE 25 MG/1
25 TABLET ORAL 3 TIMES DAILY PRN
Qty: 30 TABLET | Refills: 1 | Status: SHIPPED | OUTPATIENT
Start: 2022-09-01 | End: 2022-09-11

## 2022-09-01 RX ORDER — METHYLPREDNISOLONE 4 MG/1
TABLET ORAL
Qty: 1 KIT | Refills: 0 | Status: SHIPPED | OUTPATIENT
Start: 2022-09-01 | End: 2022-09-07

## 2022-09-01 NOTE — TELEPHONE ENCOUNTER
Patients daughter called RE: his vertigo. He is experiencing it once again she stated. He fell yesterday. This morning he had a bad experience with it too. Is there something he can take or be prescribed for this? Does he need an appointment? She stated he was seen for it in July and was prescribed something.     Thank you

## 2022-09-12 ENCOUNTER — PATIENT MESSAGE (OUTPATIENT)
Dept: INTERNAL MEDICINE | Age: 77
End: 2022-09-12

## 2022-09-12 RX ORDER — INDOMETHACIN 50 MG/1
50 CAPSULE ORAL 3 TIMES DAILY
Qty: 21 CAPSULE | Refills: 0 | Status: SHIPPED | OUTPATIENT
Start: 2022-09-12

## 2022-09-12 NOTE — TELEPHONE ENCOUNTER
From: Edvin Cordero  To: Mary Jo Richard  Sent: 9/12/2022 7:42 AM EDT  Subject: Indocmethacin    Hello, dad let me know I ask you to fill the wrong one. ..its actually the above that helped his hands and shoulders, he had a few left and took them over the weekend and today was able to come to the barn in the morning to feed horses. Can you advise if this can be filled?  thanks

## 2022-09-26 RX ORDER — ATORVASTATIN CALCIUM 40 MG/1
40 TABLET, FILM COATED ORAL DAILY
Qty: 90 TABLET | Refills: 1 | Status: SHIPPED | OUTPATIENT
Start: 2022-09-26

## 2022-09-26 RX ORDER — INDOMETHACIN 50 MG/1
50 CAPSULE ORAL 3 TIMES DAILY
Qty: 21 CAPSULE | Refills: 0 | OUTPATIENT
Start: 2022-09-26

## 2022-09-26 NOTE — TELEPHONE ENCOUNTER
Comments:     Last Office Visit (last PCP visit):   6/24/2022    Next Visit Date:  No future appointments. **If hasn't been seen in over a year OR hasn't followed up according to last diabetes/ADHD visit, make appointment for patient before sending refill to provider.     Rx requested:  Requested Prescriptions     Pending Prescriptions Disp Refills    atorvastatin (LIPITOR) 40 MG tablet 90 tablet 1     Sig: Take 1 tablet by mouth daily    indomethacin (INDOCIN) 50 MG capsule 21 capsule 0     Sig: Take 1 capsule by mouth 3 times daily

## 2022-09-29 ENCOUNTER — OFFICE VISIT (OUTPATIENT)
Dept: INTERNAL MEDICINE | Age: 77
End: 2022-09-29
Payer: MEDICARE

## 2022-09-29 VITALS
HEIGHT: 70 IN | TEMPERATURE: 97.5 F | HEART RATE: 65 BPM | OXYGEN SATURATION: 98 % | WEIGHT: 156 LBS | DIASTOLIC BLOOD PRESSURE: 68 MMHG | SYSTOLIC BLOOD PRESSURE: 150 MMHG | BODY MASS INDEX: 22.33 KG/M2

## 2022-09-29 DIAGNOSIS — R06.02 SOB (SHORTNESS OF BREATH): ICD-10-CM

## 2022-09-29 DIAGNOSIS — M25.50 DIFFUSE ARTHRALGIA: Primary | ICD-10-CM

## 2022-09-29 DIAGNOSIS — M79.10 MYALGIA: ICD-10-CM

## 2022-09-29 DIAGNOSIS — R53.1 WEAKNESS: ICD-10-CM

## 2022-09-29 PROCEDURE — 99214 OFFICE O/P EST MOD 30 MIN: CPT | Performed by: PHYSICIAN ASSISTANT

## 2022-09-29 PROCEDURE — 1123F ACP DISCUSS/DSCN MKR DOCD: CPT | Performed by: PHYSICIAN ASSISTANT

## 2022-09-29 RX ORDER — MELOXICAM 7.5 MG/1
7.5 TABLET ORAL DAILY
Qty: 90 TABLET | Refills: 3 | Status: SHIPPED | OUTPATIENT
Start: 2022-09-29

## 2022-09-29 NOTE — PROGRESS NOTES
Barron Mcguire (: ) is a 65 y.o. male, Established patient, here for evaluation of the following chief complaint(s):  Arthritis (Stiffness, can hardly move hands, trouble driving, if he sits to long he cant get up due to his kness, can not button shirt, Lt foot is killing him, getting hard to breathe)        ASSESSMENT/PLAN:  1. Diffuse arthralgia  2. Weakness  3. Myalgia  - meloxicam (MOBIC) 7.5 MG tablet; Take 1 tablet by mouth daily  Dispense: 90 tablet; Refill: 3    4. SOB (shortness of breath)  - no distress  - exam normal  - O2 sat normal   - go to the ER if worsening       No follow-ups on file. SUBJECTIVE/OBJECTIVE:  HPI    Joint pain and stiffness   Started to get worse, he feels that he is SOB   Stiffness, knee pain, hand pain, trouble driving due to pain   Indocin and medrol dose have helped in the past   DM- controlled       Review of Systems   Constitutional: Negative. Respiratory:  Positive for shortness of breath. Negative for cough, chest tightness and wheezing. Musculoskeletal:  Positive for arthralgias, gait problem and myalgias. Negative for joint swelling, neck pain and neck stiffness. Physical Exam  Vitals reviewed. Constitutional:       Appearance: Normal appearance. Cardiovascular:      Rate and Rhythm: Normal rate and regular rhythm. Heart sounds: Normal heart sounds. Pulmonary:      Effort: Pulmonary effort is normal. No respiratory distress. Breath sounds: Normal breath sounds. No wheezing, rhonchi or rales. Neurological:      General: No focal deficit present. Mental Status: He is alert. Gait: Gait abnormal (slow gait, using a cane).        Vitals:    22 1451 22 1513   BP: (!) 150/68 (!) 150/68   Site: Left Upper Arm Right Upper Arm   Position: Sitting Sitting   Cuff Size: Medium Adult Medium Adult   Pulse: 65    Temp: 97.5 °F (36.4 °C)    TempSrc: Infrared    SpO2: 98%    Weight: 156 lb (70.8 kg)    Height: 5' 10\" (1.416 m)                  An electronic signature was used to authenticate this note.     --DARCI Kumar

## 2022-09-30 ASSESSMENT — ENCOUNTER SYMPTOMS
WHEEZING: 0
COUGH: 0
SHORTNESS OF BREATH: 1
CHEST TIGHTNESS: 0

## 2022-11-04 NOTE — TELEPHONE ENCOUNTER
Comments: Are you still wanting patient to take a blood thinner? Last Office Visit (last PCP visit):   9/29/2022    Next Visit Date:  Future Appointments   Date Time Provider Lotus Zazueta   12/6/2022  9:00 AM DARCI Kumar [facility]       **If hasn't been seen in over a year OR hasn't followed up according to last diabetes/ADHD visit, make appointment for patient before sending refill to provider.     Rx requested:  Requested Prescriptions     Pending Prescriptions Disp Refills    metFORMIN (GLUCOPHAGE) 1000 MG tablet [Pharmacy Med Name: metFORMIN HCl 1000 MG Oral Tablet] 180 tablet 0     Sig: Take 1 tablet by mouth twice daily

## 2022-11-04 NOTE — TELEPHONE ENCOUNTER
Pt came to the office RE: his prescription refill and he mentioned something about not taking his blood thinner anymore. I wasn't sure what to tell him so I said I'd encounter this. 3    Thank you

## 2022-11-04 NOTE — TELEPHONE ENCOUNTER
Comments:     Last Office Visit (last PCP visit):   9/29/2022    Next Visit Date:  Future Appointments   Date Time Provider Lotus Zazueta   12/6/2022  9:00  [facility]       **If hasn't been seen in over a year OR hasn't followed up according to last diabetes/ADHD visit, make appointment for patient before sending refill to provider.     Rx requested:  Requested Prescriptions     Pending Prescriptions Disp Refills    metFORMIN (GLUCOPHAGE) 1000 MG tablet [Pharmacy Med Name: metFORMIN HCl 1000 MG Oral Tablet] 180 tablet 0     Sig: Take 1 tablet by mouth twice daily

## 2022-12-06 ENCOUNTER — OFFICE VISIT (OUTPATIENT)
Dept: INTERNAL MEDICINE | Age: 77
End: 2022-12-06
Payer: MEDICARE

## 2022-12-06 VITALS
HEART RATE: 63 BPM | RESPIRATION RATE: 16 BRPM | DIASTOLIC BLOOD PRESSURE: 80 MMHG | WEIGHT: 159.4 LBS | OXYGEN SATURATION: 90 % | HEIGHT: 70 IN | SYSTOLIC BLOOD PRESSURE: 144 MMHG | BODY MASS INDEX: 22.82 KG/M2

## 2022-12-06 DIAGNOSIS — I10 PRIMARY HYPERTENSION: ICD-10-CM

## 2022-12-06 DIAGNOSIS — E11.9 TYPE 2 DIABETES MELLITUS WITHOUT COMPLICATION, WITHOUT LONG-TERM CURRENT USE OF INSULIN (HCC): Primary | ICD-10-CM

## 2022-12-06 LAB — HBA1C MFR BLD: 7.1 %

## 2022-12-06 PROCEDURE — 99214 OFFICE O/P EST MOD 30 MIN: CPT | Performed by: PHYSICIAN ASSISTANT

## 2022-12-06 PROCEDURE — 3074F SYST BP LT 130 MM HG: CPT | Performed by: PHYSICIAN ASSISTANT

## 2022-12-06 PROCEDURE — 1123F ACP DISCUSS/DSCN MKR DOCD: CPT | Performed by: PHYSICIAN ASSISTANT

## 2022-12-06 PROCEDURE — 3078F DIAST BP <80 MM HG: CPT | Performed by: PHYSICIAN ASSISTANT

## 2022-12-06 PROCEDURE — 83036 HEMOGLOBIN GLYCOSYLATED A1C: CPT | Performed by: PHYSICIAN ASSISTANT

## 2022-12-06 PROCEDURE — 3051F HG A1C>EQUAL 7.0%<8.0%: CPT | Performed by: PHYSICIAN ASSISTANT

## 2022-12-06 RX ORDER — FEXOFENADINE HYDROCHLORIDE 60 MG/1
60 TABLET, FILM COATED ORAL DAILY
Refills: 0 | Status: CANCELLED | OUTPATIENT
Start: 2022-12-06

## 2022-12-06 NOTE — PROGRESS NOTES
Keerthi Mcwilliams (: [id number]) is a 65 y.o. male, Established patient, here for evaluation of the following chief complaint(s):  Follow-up (Follow up for diffuse arthralgia. Meloxicam is effective. ), Shortness of Breath (SOB has been getting better.), and Allergic Rhinitis  (When going out to the barn he gets a runny nose and is allergic to the hay. He reports more difficulty hearing lately.)        ASSESSMENT/PLAN:  1. Type 2 diabetes mellitus without complication, without long-term current use of insulin (HCC)  - POCT glycosylated hemoglobin (Hb A1C)  -  A1C improved : going from 8.2 to 7.1  -  Diabetes Counseling : Discussed disease risks, adopting healthy behaviors,  monitoring glucose and bringing logs to visits  - Discussed most recent labs in detail, foot care, and compliance with all medications, and yearly eye exam  - Diet: Addressed ADA, low sodium and low cholesterol diet. - Exercise: discussed need for aerobic exercise 3 times weekly  - Medications:   Continued meds       2. Primary hypertension  - slightly elevated today  - advised pt to monitor at home    - return if the b/p continue to be elevated       No follow-ups on file. SUBJECTIVE/OBJECTIVE:  HPI      Diabetes Mellitus Type 2:   Medication compliance:  compliant most of the time  Current medication regimen: metformin 1000 mg BID   Diet compliance:  compliant most of the time  Weight trend: increasing  Current exercise: no regular exercise but very active   Current symptoms/problems include none. Home blood sugar records:  fasting range: around 130's   Any episodes of hypoglycemia? no  Tobacco history: He  reports that he quit smoking about 27 years ago. His smoking use included cigarettes. He has a 30.00 pack-year smoking history. He has never used smokeless tobacco.   Daily Aspirin?  No, on Plavix    Known diabetic complications: cardiovascular disease carotid artery disease     Hypertension:    Home blood pressure monitoring: No.  He is adherent to a low sodium diet. Patient denies chest pain, shortness of breath, headache, blurred vision, peripheral edema, palpitations, dry cough, and fatigue. Use of agents associated with hypertension: none. Brigido lightheadedness with fast movement      Hyperlipidemia:  No new myalgias or GI upset on atorvastatin (Lipitor). Diabetes and Hypertension Visit Information    BP Readings from Last 3 Encounters:   12/06/22 (!) 144/80   09/29/22 (!) 150/68   06/24/22 120/63          Hemoglobin A1C (%)   Date Value   12/06/2022 7.1   06/24/2022 8.2   06/02/2022 9.4 (A)     Microalbumin, Random Urine (mg/dL)   Date Value   06/05/2018 <1.20     LDL Calculated (mg/dL)   Date Value   11/05/2020 see below     HDL (mg/dL)   Date Value   11/05/2020 35 (L)     BUN (mg/dL)   Date Value   05/24/2022 24 (H)     Creatinine (mg/dL)   Date Value   06/03/2022 0.96     Glucose (mg/dL)   Date Value   06/03/2022 153 (H)        Patient Care Team:  DARCI Mora as PCP - General (Physician Assistant)  DARCI Mora as PCP - [facility] Provider  Denae Loo MD as Consulting Physician (Rheumatology)  Justyna Rivera DO as Cardiologist (Cardiology)         Medical History Review  Past Medical, Family, and Social History reviewed and does contribute to the patient presenting condition    Health Maintenance   Topic Date Due    Hepatitis C screen  Never done    DTaP/Tdap/Td vaccine (2 - Td or Tdap) 04/18/1973    Shingles vaccine (1 of 2) Never done    Lipids  11/05/2021    COVID-19 Vaccine (3 - Booster for Sandoval Peter series) 11/23/2021    Flu vaccine (1) 08/01/2022    Depression Screen  06/24/2023    Annual Wellness Visit (AWV)  06/25/2023    Pneumococcal 65+ years Vaccine  Completed    Hepatitis A vaccine  Aged Out    Hib vaccine  Aged Out    Meningococcal (ACWY) vaccine  Aged Out           Review of Systems   All other systems reviewed and are negative. Physical Exam  Vitals reviewed.    Constitutional: Appearance: Normal appearance. Cardiovascular:      Rate and Rhythm: Normal rate. Heart sounds: Normal heart sounds. Pulmonary:      Effort: Pulmonary effort is normal.      Breath sounds: Normal breath sounds. Neurological:      Mental Status: He is alert. Vitals:    12/06/22 0907 12/06/22 0935   BP: (!) 170/88 (!) 144/80   Site: Left Upper Arm    Position: Sitting    Cuff Size: Medium Adult    Pulse: 63    Resp: 16    SpO2: 90%    Weight: 159 lb 6.4 oz (72.3 kg)    Height: 5' 10\" (1.778 m)                  An electronic signature was used to authenticate this note.     --Trae Aquino PA

## 2022-12-06 NOTE — PROGRESS NOTES
Gala Malcolm (: ) is a 65 y.o. male, Established patient, here for evaluation of the following chief complaint(s):  Follow-up (Follow up for diffuse arthralgia. Meloxicam is effective. ), Shortness of Breath (SOB has been getting better.), and Allergic Rhinitis  (When going out to the barn he gets a runny nose and is allergic to the hay. He reports more difficulty hearing lately.)        ASSESSMENT/PLAN:  There are no diagnoses linked to this encounter. No follow-ups on file. SUBJECTIVE/OBJECTIVE:  HPI    Review of Systems    Physical Exam    Vitals:    22 0907 22 0935   BP: (!) 170/88 (!) 144/80   Site: Left Upper Arm    Position: Sitting    Cuff Size: Medium Adult    Pulse: 63    Resp: 16    SpO2: 90%    Weight: 159 lb 6.4 oz (72.3 kg)    Height: 5' 10\" (1.778 m)                  An electronic signature was used to authenticate this note.     --DARCI Ulloa

## 2023-02-15 LAB
ATRIAL RATE: 93 BPM
P AXIS: 52 DEGREES
P OFFSET: 207 MS
P ONSET: 153 MS
PR INTERVAL: 150 MS
Q ONSET: 228 MS
QRS COUNT: 15 BEATS
QRS DURATION: 82 MS
QT INTERVAL: 340 MS
QTC CALCULATION(BAZETT): 422 MS
QTC FREDERICIA: 393 MS
R AXIS: 5 DEGREES
T AXIS: 46 DEGREES
T OFFSET: 398 MS
VENTRICULAR RATE: 93 BPM

## 2023-02-28 NOTE — DISCHARGE SUMMARY
Send Summary:   Discharge Summary Providers:  Provider Role Provider Name   · Consulting Keke Felipe   · Attending Gerald Gonsales   · Consulting Marleni Calloway   · Consulting Luigi Franklin   · Consulting Melinda Lucero   · Primary Tomasa Conner       Note Recipients: Tomasa Conner, PAC - [phone]  []       Discharge:    Summary:   Admission Date: .31-May-2022 14:57:00   Discharge Date: 03-Jun-2022   Attending Physician at Discharge: Gerald Gonsales   Admission Reason: Dizziness   Final Discharge Diagnoses: Carotid artery stenosis  - bilateral  Chronic encephalopathy in the setting of advanced dementia  CVA in the past  Diabetes mellitus non-insulin-dependent  Chronic diarrhea  Ataxia  Dizziness   Procedures: none   Condition at Discharge: Satisfactory   Disposition at Discharge: .Home   Vital Signs:        T   P  R  BP   MAP  SpO2   Value  36.6  72  18  137/64   116  91%  Date/Time 6/3 7:32 6/3 8:27 6/2 15:33 6/3 8:27  6/3 7:32 6/3 7:32  Range  (36.1C - 37.5C )  (63 - 87 )  (18 - 18 )  (112 - 190 )/ (53 - 81 )  (76 - 116 )  (90% - 97% )  Highest temp of 37.5 C was recorded at 6/2 19:29    Date:            Weight/Scale Type:  Height:   01-Jun-2022 02:59  67  kg / bed  177.8  cm  Physical Exam:    General well-developed well-nourished older male resting in bed no acute distress  Head normocephalic nontraumatic  Eye extraocular movement intact, no scleral icterus  Cardiac regular rate and rhythm normal S1-2 no murmurs or gallop   respiratory no wheezing rales or rhonchi no use of accessory muscles  Abdomen ND, NT, no rebound or guarding bs present  Extremities no cyanosis no clubbing no edema  Neuro awake alert forgetful, follows commands  Psych calm cooperative appropriate  Skin warm dry no rash.    Hospital Course:    The patient is a 77-year-old male with Alzheimer's dementia, carotid artery stenosis, diabetes mellitus, hypertension, hyperlipidemia, syncope, CVA in the past who  was  referred to the emergency department with difficulty walking.  Per patient's daughter patient was seen at outside facility several days prior and was found to have carotid artery stenosis.  There were plans for intervention however procedure reportedly  abruptly canceled and to be done as an outpatient.  Patient had been having syncope and some falls as well.  On arrival patient was noted to be dynamically stable with a blood pressure of 138/65 heart rate of 68 temperature 36.4 labs revealed WC 6.9 hematocrit  34.9 platelets 301 sodium 133 potassium 3.9 creatinine 1.23.  CT of the head revealed global volume loss and chronic small vessel ischemic changes.  Chest x-ray revealed mild diffuse interstitial prominence.  Linear lucency overlying left inferolateral  chest wall.  Patient was evaluated by neurology.  MRI brain had no acute infarct or mass-effect.  Neuro recommended that patient continue on Plavix with supportive management.  PT OT evaluation which recommended 24-hour supervision given cognition.  Patient  was evaluated by vascular surgery as she was found to have bilateral carotid artery stenosis.  No surgical intervention was indicated at this time.  Recommending Plavix statin and outpatient follow-up for surveillance. Patient also seen by Gastroenterology  given chronic diarrhea.  While outpatient did have soft brown stools.  No acute intervention for was recommended.  Patient can follow-up with gastroenterology as needed as an outpatient.    45 mins spent on dc planning counseling and education.      Discharge Information:    and Continuing Care:   Lab Results - Pending:    None  Radiology Results - Pending: None   Discharge Instructions:    Activity:           activity as tolerated.    Nutrition/Diet:           low fat,  low sodium    Follow Up Appointments:    Follow-Up Appointment 01:           Physician/Dept/Service:   Tomasa Conner - PCP          Call to Schedule in:   2-3 days    Follow-Up  Appointment 02:           Physician/Dept/Service:   Luigi Franklin MD          Reason for Referral:   Vascular Surgery          Call to Schedule in:   6 months          Phone Number:   [phone]    Discharge Medications: Home Medication   metFORMIN - 1  orally 2 times a day  clopidogrel 75 mg oral tablet - 1 tab(s) orally once a day  atorvastatin 40 mg oral tablet - 1 tab(s) orally once a day      PRN Medication     DNR Status:   ·  Code Status Code Status order at time of discharge: Full Code       Electronic Signatures:  Gerald Gonsales)  (Signed 03-Jun-2022 12:41)   Authored: Send Summary, Summary Content, Ongoing Care,  DNR Status, Note Completion      Last Updated: 03-Jun-2022 12:41 by Gerald Gonsales)

## 2023-02-28 NOTE — H&P
"    History of Present Illness:   HPI:    ROME EGAN is a 77 year old Male who was sent in by his primary physician d/t ataxia- note the patient is a very poor historian and it is unclear exactly why the  patient was sent to the ED as well as what the presenting complaint was    Per ED report the patient saw his primary physician dr august on the date of presentation.  per notes she was concerned that the patients gait was more ataxic than usual and she was concerned t hat the patient may have beeen at risk of falling.  per  report she called 911 and the patient was brought to the ED for evaluation.      pt does admit to having difficulty walking but is unable to provide any more detail.  he does state that recently he had testing done that showed \"blockage of blood flow in my neck\" but is unsure exactly what studies were performed or what the reccommended  treatment for it had been.  the patient denies any current complaints other than loose stools taht he sometimes gets when he eats food.  denies abdominal pain nausea or vomiting    on arrival to the ED vitals were WNL.  ED staff contacted the NP covering pt's PCP, who suggested the patient should be admitted for neurology evaluation.      PMHx:  syncope CVA Alzheimer's dementia aneurysms diabetes hypertension hyperlipidemia.  pt also has history of \"blood clots\" but  does not know any other details.  pt's home medication list is unknown as of this writing     SHx:  no reported tobacco etoh or illicit drug use    FHx and ROS not obtainable d/t mental status    Comorbidities:   Comorbidites:  ·  Comorbid Conditions diabetes, hypertension   ·  Diabetes Type Type 2   ·  Insulin Dependent unknown   ·  DM Acuity or Status controlled   ·  DM Complications unknown            Allergies:  ·  No Known Allergies :     Medications Prior to Admission:   Admission Medication Reconciliation has not been completed for this patient.    Objective:     Objective Information:  "     T   P  R  BP   MAP  SpO2   Value  36.3  80  16  140/67   96  90%  Date/Time 6/1 2:45 6/1 2:45 6/1 0:39 6/1 2:45  6/1 2:45 6/1 2:45  Range  (36.3C - 36.4C )  (68 - 105 )  (16 - 16 )  (134 - 154 )/ (64 - 74 )  (96 - 96 )  (90% - 98% )      Pain reported at 6/1 3:05: 0 = None    Physical Exam by System:    Constitutional: pleasantly confused, not in acute  distress   Eyes: PERRL, EOMI, clear sclera   ENMT: mucous membranes moist, no apparent injury,  no lesions seen   Head/Neck: Neck supple, no apparent injury, thyroid  without mass or tenderness, No JVD, trachea midline, no bruits   Respiratory/Thorax: Patent airways, CTAB, normal  breath sounds with good chest expansion, thorax symmetric   Cardiovascular: Regular, rate and rhythm, no murmurs,  2+ equal pulses of the extremities, normal S 1and S 2   Gastrointestinal: Nondistended, soft, non-tender,  no rebound tenderness or guarding, no masses palpable, no organomegaly, +BS, no bruits   Musculoskeletal: ROM intact, no joint swelling, normal  strength   Extremities: normal extremities, no cyanosis edema,  contusions or wounds, no clubbing   Neurological: pleasantly confused oriented x2-3 but  unable to recite basic facts about his medical history or recent diagnostic testing.  muscle strength 5/5 throughout no dysarthria cranial nerves grossly intact   Lymphatic: No significant lymphadenopathy   Psychological: Appropriate mood and behavior   Skin: Warm and dry, no lesions, no rashes     Recent Lab Results:    Results:    CBC: 5/31/2022 15:53              \     Hgb     /                              \     11.7 L    /  WBC  ----------------  Plt               6.9       ----------------    301              /     Hct     \                              /     34.9 L    \            RBC: 4.03 L    MCV: 87     Neutrophil %: 57.1      CMP: 5/31/2022 15:53  NA+        Cl-     BUN  /                         133 L   98    19  /  --------------------------------  Glucose                 ---------------------------  189 H    K+     HCO3-   Creat \                         3.9    24    1.23  \           \  T Bili  /                    \  0.6  /  AST  x ---- x ALT        18 x ---- x 23         /  Alk P   \               /  48  \  Calcium : 8.9     Anion Gap : 15     Albumin : 3.6     T Protein : 6.6           Radiology Results:    Results:        Impression:    Global volume loss and chronic small vessel ischemic change. No  evidence of acute intracranial hemorrhage.     If persistent concern, MRI can be considered.     CT Head without Contrast [May 31 2022  6:12PM]      Impression:    1.  Mild diffuse interstitial prominence and mild patchy bibasilar  infiltrates and/or atelectasis.  2. Linear lucency overlying left inferolateral chest wall could be  artifactual though component of soft tissue/subcutaneous gas cannot  be entirely excluded. Clinical correlation and follow-up advised.        Xray Chest 1 View [May 31 2022  4:26PM]      Assessment and Plan:     Impression 1: dizziness/ataxia- sent from PCP's office  for neurologic evaluation.  pt has apparently had neuroimaging recently but it is unclear where or what the results were   Plan for Impression 1: admit  monitor on telemetry  serial neurologic checks  need to contact family/PCP for collateral info and info on previous testing so as to avoid duplication  neurology consult entered  further testing pending above   Impression 2: DM2 controlled   Plan for Impression 2: awaiting home medicaitons  accucheck ac/hs  SSI  restart insulin at home dose   Impression 3: ?VTE history/DM2/HTN/dementia   Plan for Impression 3: resume home medications as  indicated when home med list is available   Impression 4: VTE prophylaxis   Plan for Impression 4: await home medication list  as pt is apparently on anticoagulation at home       Electronic Signatures:  Jr Emerson)  (Signed 01-Jun-2022 07:48)   Authored: History of Present  Illness, Comorbidities,  Allergies, Medications Prior to Admission, Objective, Assessment and Plan, Note Completion      Last Updated: 01-Jun-2022 07:48 by Jr Emerson)

## 2023-02-28 NOTE — PROGRESS NOTES
Service: General Internal Medicine      Subjective Data:   ROME EGAN is a 77 year old Male who is Hospital Day # 3.    Additional Information:    Patient resting comfortably in bed.  He denies any new complaints.  Patient's chart checked no records from [facility] available.  Carotid ultrasound noted bilateral  stenosis.  Will ask vascular surgery to evaluate.  Awaiting MRI.     Objective Data:     Objective Information:      T   P  R  BP   MAP  SpO2   Value  36.9  87  18  112/53   76  97%  Date/Time 6/2 8:18 6/2 8:18 6/1 13:46 6/2 8:18  6/2 8:18 6/2 8:18  Range  (36.3C - 37.5C )  (77 - 96 )  (16 - 18 )  (112 - 147 )/ (53 - 75 )  (76 - 96 )  (90% - 97% )  Highest temp of 37.5 C was recorded at 6/1 13:46      Pain reported at 6/1 21:33: 0 = None    Physical Exam Narrative:  ·  Physical Exam:    Vitals reviewed  General well-developed well-nourished older male resting in bed no acute distress  Head normocephalic nontraumatic  Eye extraocular movement intact, no scleral icterus  Cardiac regular rate and rhythm normal S1-2 no murmurs or gallop   respiratory no wheezing rales or rhonchi no use of accessory muscles  Abdomen ND, NT, no rebound or guarding bs present  Extremities no cyanosis no clubbing no edema  Neuro awake alert forgetful, follows commands  Psych calm cooperative appropriate  Skin warm dry no rash.      Medication:    Medications:          Continuous Medications       --------------------------------    1. Sodium Chloride 0.9% Infusion:  1000  mL  IntraVenous  <Continuous>    2. Sodium Chloride 0.9% Infusion:  1000  mL  IntraVenous  <Continuous>         Scheduled Medications       --------------------------------    1. Clopidogrel:  75  mg  Oral  Daily    2. Heparin SubCutaneous:  5000  unit(s)  SubCutaneous  Every 8 Hours    3. Insulin Lispro Moderate Corrective Scale:  unit(s)  SubCutaneous  3 Times a Day Before Meals         PRN Medications       --------------------------------    1.  Acetaminophen:  650  mg  Oral  Every 4 Hours    2. Dextrose 50% in Water Injectable:  25  gram(s)  IntraVenous Push  Every 15 Minutes    3. Glucagon Injectable:  1  mg  IntraMuscular  Every 15 Minutes    4. Ondansetron Injectable:  4  mg  IntraVenous Push  Every 4 Hours         Conditional Medication Orders       --------------------------------    1. Dextrose 10% in Water Infusion:  500  mL  IntraVenous  <Continuous>      Recent Lab Results:    Results:        BMP: 6/2/2022 06:11  NA+        Cl-     BUN  /                         137    102    11  /  --------------------------------  Glucose                ---------------------------  155 H    K+     HCO3-   Creat \                         3.3 L 25    0.94  \  Calcium : 7.9 L    Anion Gap : 13      Radiology Results:    Results:        Impression:    1. Right internal carotid stenosis of 70% or greater.  2. Left internal carotid stenosis of at least 50-69%.  3. Grayscale and color Doppler narrowing in the mid left common  carotid artery, with focal elevation of peak systolic velocity  suspicious for hemodynamically significant stenosis. This is  incompletely assessed on the current study; a tandem stenosis in the  left common carotid artery could result in underestimation of the  degree of stenosis further distally in the left internal carotid. CT  angiogram of the soft tissues of the neck is recommended to more  thoroughly assess this finding.     The velocity criteria are extrapolated fromdiameter data as defined  by the Society of Radiologists in Ultrasound Consensus Conference  Radiology 2003; 229;340-346.     Ultrasound Carotid Bilateral Duplex [Jun 2 2022  8:51AM]      Impression:    Global volume loss and chronic small vessel ischemic change. No  evidence of acute intracranial hemorrhage.     If persistent concern, MRI can be considered.     CT Head without Contrast [May 31 2022  6:12PM]        Assessment and Plan:   Code Status:  ·  Code Status  Full Code     Assessment:    The patient is a 77-year-old male with Alzheimer's dementia, carotid artery stenosis, diabetes mellitus, hypertension, hyperlipidemia, syncope, CVA in the past who was referred  to the emergency department with difficulty walking.  Per patient's daughter patient was seen at outside facility several days prior and was found to have carotid artery stenosis.  There were plans for intervention however procedure reportedly abruptly  canceled and to be done as an outpatient.  Patient had been having syncope and some falls as well.  On arrival patient was noted to be dynamically stable with a blood pressure of 138/65 heart rate of 68 temperature 36.4 labs revealed WC 6.9 hematocrit  34.9 platelets 301 sodium 133 potassium 3.9 creatinine 1.23.  CT of the head revealed global volume loss and chronic small vessel ischemic changes.  Chest x-ray revealed mild diffuse interstitial prominence.  Linear lucency overlying left inferolateral  chest wall.    Ataxia  Dizziness  Neuro on board  MRI  PT OT    Carotid artery stenosis - bilateral  Carotid ultrasound noted - will ask vascular to eval    Chronic encephalopathy in the setting of advanced dementia  Status post bedside swallow eval passed  Reorient as needed    CVA in the past  Secondary prevention    Diabetes mellitus non-insulin-dependent  Hold oral agents  SSI while in-house    Dispo: plan to return to family at dc      Electronic Signatures:  Gerald Gonsales)  (Signed 02-Jun-2022 10:22)   Authored: Service, Subjective Data, Objective Data, Assessment  and Plan, Note Completion      Last Updated: 02-Jun-2022 10:22 by Gerald Gonsales)

## 2023-02-28 NOTE — PROGRESS NOTES
Service: Neurology     Subjective Data:   ROME EGAN is a 77 year old Male who is Hospital Day # 4.     s.  Patient is feeling a lot better.  Denies having any diarrhea.  Does complain of abdominal discomfort no new weakness numbness or any dizziness.  No history of any seizure or seizure-like activity.    Objective Data:     Objective Information:      T   P  R  BP   MAP  SpO2   Value  36.6  72  18  137/64   116  91%  Date/Time 6/3 7:32 6/3 8:27 6/2 15:33 6/3 8:27  6/3 7:32 6/3 7:32  Range  (36.1C - 37.5C )  (63 - 87 )  (18 - 18 )  (112 - 190 )/ (53 - 81 )  (76 - 116 )  (90% - 97% )  Highest temp of 37.5 C was recorded at 6/2 19:29      Pain reported at 6/3 8:27: 0 = None    Physical Exam by System:    Constitutional: Patient is awake alert and oriented  to place person and time.  Memory is impaired.  Not in acute distress   Eyes: PERRL, EOMI, clear sclera   ENMT: mucous membranes moist, no apparent injury,  no lesions seen   Head/Neck: Neck supple, no apparent injury, thyroid  without mass or tenderness, No JVD, trachea midline, no bruits   Respiratory/Thorax: Patent airways, CTAB, normal  breath sounds with good chest expansion, thorax symmetric   Cardiovascular: Regular, rate and rhythm, no murmurs,  2+ equal pulses of the extremities, normal S 1and S 2   Gastrointestinal: Nondistended, soft, non-tender,  no rebound tenderness or guarding, no masses palpable, no organomegaly, +BS, no bruits   Musculoskeletal: ROM intact, no joint swelling, normal  strength   Extremities: normal extremities, no cyanosis edema,  contusions or wounds, no clubbing   Neurological: Patient was sleeping but easily arousable  answering simple questions oriented to place and himself but was confused about the date and the month.  Speech was clear without any paraphasic error.  Memory was impaired short-term as well as long-term.  Attention span judgment concentration was poor.   No hallucinations or delusions.    Cranial nerve  revealed normal extraocular movement and face was symmetrical with tongue in the midline and patient was able to elevate the palate and shoulder without difficulty.  Sensations were normal.    Pupils were equal and reactive.    Motor exam revealed normal decreased tone with generalized wasting and patient was able to move all 4 extremities.    Reflexes were bilaterally hypoactive with plantar equivocal response bilaterally.    Coordination Station and gait with a slightly wide-based ataxic gait and he required assistance for ambulation.    Sensory exam was intact to light touch and pinprick.   Lymphatic: No significant lymphadenopathy   Psychological: Appropriate mood and behavior   Skin: Warm and dry, no lesions, no rashes     Medication:    Medications:          Continuous Medications       --------------------------------    1. Sodium Chloride 0.9% Infusion:  1000  mL  IntraVenous  <Continuous>    2. Sodium Chloride 0.9% Infusion:  1000  mL  IntraVenous  <Continuous>         Scheduled Medications       --------------------------------    1. Clopidogrel:  75  mg  Oral  Daily    2. Heparin SubCutaneous:  5000  unit(s)  SubCutaneous  Every 8 Hours    3. Insulin Lispro Moderate Corrective Scale:  unit(s)  SubCutaneous  3 Times a Day Before Meals         PRN Medications       --------------------------------    1. Acetaminophen:  650  mg  Oral  Every 4 Hours    2. Dextrose 50% in Water Injectable:  25  gram(s)  IntraVenous Push  Every 15 Minutes    3. Glucagon Injectable:  1  mg  IntraMuscular  Every 15 Minutes    4. Ondansetron Injectable:  4  mg  IntraVenous Push  Every 4 Hours         Conditional Medication Orders       --------------------------------    1. Dextrose 10% in Water Infusion:  500  mL  IntraVenous  <Continuous>      Recent Lab Results:    Results:        BMP: 6/3/2022 06:35  NA+        Cl-     BUN  /                         139    107    9  /  --------------------------------  Glucose                 ---------------------------  153 H    K+     HCO3-   Creat \                         3.3 L 24    0.96  \  Calcium : 8.0 L    Anion Gap : 11        I have reviewed these laboratory results:    Basic Metabolic Panel  02-Jun-2022 06:11:00      Result Value    Glucose, Serum  155   H   NA  137    K  3.3   L   CL  102    Bicarbonate, Serum  25    Anion Gap, Serum  13    BUN  11    CREAT  0.94    GFR Male  83    Calcium, Serum  7.9   L     Hemoglobin A1C, Level  02-Jun-2022 06:10:00      Result Value    Estimated Average Glucose  223    Hemoglobin A1C, Level  9.4 Diagnosis of Diabetes-Adults   Non-Diabetic: < or = 5.6%   Increased risk for developing diabetes: 5.7-6.4%   Diagnostic of diabetes: > or = 6.5%  .       Monitoring of Diabetes                Age (y)     Therapeutic Goal (%)   Adults:          >1   A       Radiology Results:    Results:        Impression:    No acute intracranial infarct, or mass effect.     Mild degree of microangiopathy. Old infarct within the left basal  ganglia.     MRI Brain without Contrast [Jun 2 2022  1:38PM]      Impression:    1. Right internal carotid stenosis of 70% or greater.  2. Left internal carotid stenosis of at least 50-69%.  3. Grayscale and color Doppler narrowing in the mid left common  carotid artery, with focal elevation of peak systolic velocity  suspicious for hemodynamically significant stenosis. This is  incompletely assessed on the current study; a tandem stenosis in the  left common carotid artery could result in underestimation of the  degree of stenosis further distally in the left internal carotid. CT  angiogram of the soft tissues of the neck is recommended to more  thoroughly assess this finding.     The velocity criteria are extrapolated fromdiameter data as defined  by the Society of Radiologists in Ultrasound Consensus Conference  Radiology 2003; 229;340-346.     Ultrasound Carotid Bilateral Duplex [Jun 2 2022  8:51AM]      Assessment and Plan:         Admitting Dx:   Chest pain: Onset Date: 01-Jun-2022, Entered Date: 01-Jun-2022  07:59       Additional Dx:   Carotid stenosis, asymptomatic: Entered Date: 02-Jun-2022  18:41   CVA, old, ataxia: Entered Date: 02-Jun-2022 18:41   Acute encephalopathy: Entered Date: 01-Jun-2022 11:35   Type 2 diabetes mellitus without complication: Entered Date:  01-Jun-2022 07:48   Primary hypertension: Entered Date: 01-Jun-2022 07:48    Code Status:  ·  Code Status Full Code     Assessment:    Impression.  1.  History of acute change in mental status most likely acute encephalopathy due to dehydration probably from chronic diarrhea.    2.  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    3.  History of abdominal pain with diarrhea.    Plan.  Continue with supportive care for now and treat underlying medical condition continue the PT OT evaluation and okay to discharge when medically stable.  Patient can follow-up with his primary care physician and can see us as needed.  [phone].    I had a long discussion about the signs and the risk factor for CVA and the trigger factors of the CVA to the brain risk and the precautions which he understood.    Will sign off.      Electronic Signatures:  Edwin Gaitan)  (Signed 03-Jun-2022 12:48)   Authored: Service, Subjective Data, Objective Data, Assessment  and Plan, Note Completion      Last Updated: 03-Jun-2022 12:48 by Edwin Gaitan)

## 2023-02-28 NOTE — PROGRESS NOTES
Service: Neurology     Subjective Data:   ROME EGAN is a 77 year old Male who is Hospital Day # 3.     s.  Patient is feeling better no new weakness numbness and his dizziness is improved.  He still complaining of abdominal discomfort but was able to keep his food down and had 100% dinner.  No history  of any weakness numbness seizure or seizure-like activity.    Objective Data:     Objective Information:      T   P  R  BP   MAP  SpO2   Value  37.3  66  18  123/58   83  92%  Date/Time 6/2 15:33 6/2 15:33 6/2 15:33 6/2 15:33  6/2 15:33 6/2 15:33  Range  (36.3C - 37.5C )  (66 - 96 )  (16 - 18 )  (112 - 147 )/ (53 - 75 )  (76 - 96 )  (90% - 97% )  Highest temp of 37.5 C was recorded at 6/1 13:46      Pain reported at 6/2 9:29: 0 = None    Physical Exam by System:    Constitutional: Patient is awake alert and oriented  to place person and time.  Memory is impaired.  Not in acute distress   Eyes: PERRL, EOMI, clear sclera   ENMT: mucous membranes moist, no apparent injury,  no lesions seen   Head/Neck: Neck supple, no apparent injury, thyroid  without mass or tenderness, No JVD, trachea midline, no bruits   Respiratory/Thorax: Patent airways, CTAB, normal  breath sounds with good chest expansion, thorax symmetric   Cardiovascular: Regular, rate and rhythm, no murmurs,  2+ equal pulses of the extremities, normal S 1and S 2   Gastrointestinal: Nondistended, soft, non-tender,  no rebound tenderness or guarding, no masses palpable, no organomegaly, +BS, no bruits   Musculoskeletal: ROM intact, no joint swelling, normal  strength   Extremities: normal extremities, no cyanosis edema,  contusions or wounds, no clubbing   Neurological: Patient was sleeping but easily arousable  answering simple questions oriented to place and himself but was confused about the date and the month.  Speech was clear without any paraphasic error.  Memory was impaired short-term as well as long-term.  Attention span judgment concentration  was poor.   No hallucinations or delusions.    Cranial nerve revealed normal extraocular movement and face was symmetrical with tongue in the midline and patient was able to elevate the palate and shoulder without difficulty.  Sensations were normal.    Pupils were equal and reactive.    Motor exam revealed normal decreased tone with generalized wasting and patient was able to move all 4 extremities.    Reflexes were bilaterally hypoactive with plantar equivocal response bilaterally.    Coordination Station and gait with a slightly wide-based ataxic gait and he required assistance for ambulation.    Sensory exam was intact to light touch and pinprick.   Lymphatic: No significant lymphadenopathy   Psychological: Appropriate mood and behavior   Skin: Warm and dry, no lesions, no rashes     Medication:    Medications:          Continuous Medications       --------------------------------    1. Sodium Chloride 0.9% Infusion:  1000  mL  IntraVenous  <Continuous>    2. Sodium Chloride 0.9% Infusion:  1000  mL  IntraVenous  <Continuous>         Scheduled Medications       --------------------------------    1. Clopidogrel:  75  mg  Oral  Daily    2. Heparin SubCutaneous:  5000  unit(s)  SubCutaneous  Every 8 Hours    3. Insulin Lispro Moderate Corrective Scale:  unit(s)  SubCutaneous  3 Times a Day Before Meals         PRN Medications       --------------------------------    1. Acetaminophen:  650  mg  Oral  Every 4 Hours    2. Dextrose 50% in Water Injectable:  25  gram(s)  IntraVenous Push  Every 15 Minutes    3. Glucagon Injectable:  1  mg  IntraMuscular  Every 15 Minutes    4. Ondansetron Injectable:  4  mg  IntraVenous Push  Every 4 Hours         Conditional Medication Orders       --------------------------------    1. Dextrose 10% in Water Infusion:  500  mL  IntraVenous  <Continuous>      Recent Lab Results:    Results:        BMP: 6/2/2022 06:11  NA+        Cl-     BUN  /                         137    102     11  /  --------------------------------  Glucose                ---------------------------  155 H    K+     HCO3-   Creat \                         3.3 L 25    0.94  \  Calcium : 7.9 L    Anion Gap : 13        I have reviewed these laboratory results:    Glucose_POCT  02-Jun-2022 06:20:00      Result Value    Glucose-POCT  152   H     Basic Metabolic Panel  02-Jun-2022 06:11:00      Result Value    Glucose, Serum  155   H   NA  137    K  3.3   L   CL  102    Bicarbonate, Serum  25    Anion Gap, Serum  13    BUN  11    CREAT  0.94    GFR Male  83    Calcium, Serum  7.9   L     Hemoglobin A1C, Level  02-Jun-2022 06:10:00      Result Value    Estimated Average Glucose  223    Hemoglobin A1C, Level  9.4 Diagnosis of Diabetes-Adults   Non-Diabetic: < or = 5.6%   Increased risk for developing diabetes: 5.7-6.4%   Diagnostic of diabetes: > or = 6.5%  .       Monitoring of Diabetes                Age (y)     Therapeutic Goal (%)   Adults:          >1   A       Radiology Results:    Results:        Impression:    Global volume loss and chronic small vessel ischemic change. No  evidence of acute intracranial hemorrhage.     If persistent concern, MRI can be considered.     CT Head without Contrast [May 31 2022  6:12PM]        Impression:    No acute intracranial infarct, or mass effect.     Mild degree of microangiopathy. Old infarct within the left basal  ganglia.     MRI Brain without Contrast [Jun 2 2022  1:38PM]      Impression:    1. Right internal carotid stenosis of 70% or greater.  2. Left internal carotid stenosis of at least 50-69%.  3. Grayscale and color Doppler narrowing in the mid left common  carotid artery, with focal elevation of peak systolic velocity  suspicious for hemodynamically significant stenosis. This is  incompletely assessed on the current study; a tandem stenosis in the  left common carotid artery could result in underestimation of the  degree of stenosis further distally in the left  internal carotid. CT  angiogram of the soft tissues of the neck is recommended to more  thoroughly assess this finding.     The velocity criteria are extrapolated fromdiameter data as defined  by the Society of Radiologists in Ultrasound Consensus Conference  Radiology 2003; 229;340-346.     Ultrasound Carotid Bilateral Duplex [Jun 2 2022  8:51AM]      Assessment and Plan:        Admitting Dx:   Chest pain: Onset Date: 01-Jun-2022, Entered Date: 01-Jun-2022  07:59       Additional Dx:   Acute encephalopathy: Entered Date: 01-Jun-2022 11:35   Type 2 diabetes mellitus without complication: Entered Date:  01-Jun-2022 07:48   Primary hypertension: Entered Date: 01-Jun-2022 07:48    Comorbidities:  ·  Comorbidity Other     Code Status:  ·  Code Status Full Code     Assessment:    Impression.  1.  History of acute dizziness with unsteady gait and wide-based gait etiology most likely due to dehydration questionable benign positional vertigo.    2.  History of an old left basal ganglia infarct.    3.  History of bilateral carotid disease with left internal carotid artery between 50 and 70% and right internal carotid artery greater than 70% which is asymptomatic.    Plan.  Continue with supportive care for now.  Vascular surgery consult was noted and patient is not a surgical candidate.  Continue with Plavix 75 mg daily signs symptoms of CVA.  Risk and the precautions were discussed with patient which patient understood.   Continue with PT OT evaluation and okay to discharge when medically stable.  Patient may need a GI consult in view of chronic GI discomfort and diarrhea.  We will follow with you.    Due to technical limitations of voice recognition and human error, this note may not accurately reflect the care of the patient.        Electronic Signatures:  Edwin Gaitan)  (Signed 02-Jun-2022 18:40)   Authored: Service, Subjective Data, Objective Data, Assessment  and Plan, Note Completion      Last Updated:  02-Jun-2022 18:40 by Edwin Gaitan)

## 2023-05-03 NOTE — TELEPHONE ENCOUNTER
Comments:     Last Office Visit (last PCP visit):   12/6/2022    Next Visit Date:  Future Appointments   Date Time Provider Lotus Zazueta   6/8/2023  9:00  [facility]       **If hasn't been seen in over a year OR hasn't followed up according to last diabetes/ADHD visit, make appointment for patient before sending refill to provider.     Rx requested:  Requested Prescriptions     Pending Prescriptions Disp Refills    metFORMIN (GLUCOPHAGE) 1000 MG tablet 180 tablet 1     Sig: Take 1 tablet by mouth 2 times daily

## 2023-06-08 ENCOUNTER — OFFICE VISIT (OUTPATIENT)
Dept: INTERNAL MEDICINE | Age: 78
End: 2023-06-08
Payer: MEDICARE

## 2023-06-08 VITALS
WEIGHT: 164 LBS | OXYGEN SATURATION: 98 % | HEIGHT: 70 IN | RESPIRATION RATE: 16 BRPM | DIASTOLIC BLOOD PRESSURE: 60 MMHG | TEMPERATURE: 97.2 F | SYSTOLIC BLOOD PRESSURE: 148 MMHG | HEART RATE: 68 BPM | BODY MASS INDEX: 23.48 KG/M2

## 2023-06-08 DIAGNOSIS — I10 ESSENTIAL HYPERTENSION: ICD-10-CM

## 2023-06-08 DIAGNOSIS — E11.9 TYPE 2 DIABETES MELLITUS WITHOUT COMPLICATION, WITHOUT LONG-TERM CURRENT USE OF INSULIN (HCC): ICD-10-CM

## 2023-06-08 DIAGNOSIS — E11.9 TYPE 2 DIABETES MELLITUS WITHOUT COMPLICATION, WITHOUT LONG-TERM CURRENT USE OF INSULIN (HCC): Primary | ICD-10-CM

## 2023-06-08 LAB
ALBUMIN SERPL-MCNC: 4.6 G/DL (ref 3.5–4.6)
ALP SERPL-CCNC: 57 U/L (ref 35–104)
ALT SERPL-CCNC: 24 U/L (ref 0–41)
ANION GAP SERPL CALCULATED.3IONS-SCNC: 15 MEQ/L (ref 9–15)
AST SERPL-CCNC: 19 U/L (ref 0–40)
BASOPHILS # BLD: 0.1 K/UL (ref 0–0.2)
BASOPHILS NFR BLD: 1.1 %
BILIRUB SERPL-MCNC: <0.2 MG/DL (ref 0.2–0.7)
BUN SERPL-MCNC: 22 MG/DL (ref 8–23)
CALCIUM SERPL-MCNC: 9.8 MG/DL (ref 8.5–9.9)
CHLORIDE SERPL-SCNC: 99 MEQ/L (ref 95–107)
CO2 SERPL-SCNC: 22 MEQ/L (ref 20–31)
CREAT SERPL-MCNC: 1.07 MG/DL (ref 0.7–1.2)
EOSINOPHIL # BLD: 0.2 K/UL (ref 0–0.7)
EOSINOPHIL NFR BLD: 3 %
ERYTHROCYTE [DISTWIDTH] IN BLOOD BY AUTOMATED COUNT: 14.5 % (ref 11.5–14.5)
GLOBULIN SER CALC-MCNC: 2.4 G/DL (ref 2.3–3.5)
GLUCOSE SERPL-MCNC: 277 MG/DL (ref 70–99)
HBA1C MFR BLD: 8.8 %
HCT VFR BLD AUTO: 34.6 % (ref 42–52)
HGB BLD-MCNC: 11.9 G/DL (ref 14–18)
LYMPHOCYTES # BLD: 1.7 K/UL (ref 1–4.8)
LYMPHOCYTES NFR BLD: 25.9 %
MCH RBC QN AUTO: 30.3 PG (ref 27–31.3)
MCHC RBC AUTO-ENTMCNC: 34.3 % (ref 33–37)
MCV RBC AUTO: 88.3 FL (ref 79–92.2)
MONOCYTES # BLD: 0.7 K/UL (ref 0.2–0.8)
MONOCYTES NFR BLD: 11.3 %
NEUTROPHILS # BLD: 3.8 K/UL (ref 1.4–6.5)
NEUTS SEG NFR BLD: 58.7 %
PLATELET # BLD AUTO: 207 K/UL (ref 130–400)
POTASSIUM SERPL-SCNC: 4.5 MEQ/L (ref 3.4–4.9)
PROT SERPL-MCNC: 7 G/DL (ref 6.3–8)
RBC # BLD AUTO: 3.92 M/UL (ref 4.7–6.1)
SODIUM SERPL-SCNC: 136 MEQ/L (ref 135–144)
WBC # BLD AUTO: 6.4 K/UL (ref 4.8–10.8)

## 2023-06-08 PROCEDURE — 83036 HEMOGLOBIN GLYCOSYLATED A1C: CPT | Performed by: PHYSICIAN ASSISTANT

## 2023-06-08 PROCEDURE — 3077F SYST BP >= 140 MM HG: CPT | Performed by: PHYSICIAN ASSISTANT

## 2023-06-08 PROCEDURE — 3078F DIAST BP <80 MM HG: CPT | Performed by: PHYSICIAN ASSISTANT

## 2023-06-08 PROCEDURE — 99214 OFFICE O/P EST MOD 30 MIN: CPT | Performed by: PHYSICIAN ASSISTANT

## 2023-06-08 PROCEDURE — 3052F HG A1C>EQUAL 8.0%<EQUAL 9.0%: CPT | Performed by: PHYSICIAN ASSISTANT

## 2023-06-08 PROCEDURE — 1123F ACP DISCUSS/DSCN MKR DOCD: CPT | Performed by: PHYSICIAN ASSISTANT

## 2023-06-08 SDOH — ECONOMIC STABILITY: FOOD INSECURITY: WITHIN THE PAST 12 MONTHS, YOU WORRIED THAT YOUR FOOD WOULD RUN OUT BEFORE YOU GOT MONEY TO BUY MORE.: NEVER TRUE

## 2023-06-08 SDOH — ECONOMIC STABILITY: HOUSING INSECURITY
IN THE LAST 12 MONTHS, WAS THERE A TIME WHEN YOU DID NOT HAVE A STEADY PLACE TO SLEEP OR SLEPT IN A SHELTER (INCLUDING NOW)?: NO

## 2023-06-08 SDOH — ECONOMIC STABILITY: INCOME INSECURITY: HOW HARD IS IT FOR YOU TO PAY FOR THE VERY BASICS LIKE FOOD, HOUSING, MEDICAL CARE, AND HEATING?: NOT HARD AT ALL

## 2023-06-08 SDOH — ECONOMIC STABILITY: FOOD INSECURITY: WITHIN THE PAST 12 MONTHS, THE FOOD YOU BOUGHT JUST DIDN'T LAST AND YOU DIDN'T HAVE MONEY TO GET MORE.: NEVER TRUE

## 2023-06-08 ASSESSMENT — PATIENT HEALTH QUESTIONNAIRE - PHQ9
SUM OF ALL RESPONSES TO PHQ QUESTIONS 1-9: 0
SUM OF ALL RESPONSES TO PHQ QUESTIONS 1-9: 0
SUM OF ALL RESPONSES TO PHQ9 QUESTIONS 1 & 2: 0
1. LITTLE INTEREST OR PLEASURE IN DOING THINGS: 0
2. FEELING DOWN, DEPRESSED OR HOPELESS: 0
SUM OF ALL RESPONSES TO PHQ QUESTIONS 1-9: 0
SUM OF ALL RESPONSES TO PHQ QUESTIONS 1-9: 0

## 2023-06-08 NOTE — PROGRESS NOTES
and regular rhythm. Heart sounds: Normal heart sounds. Pulmonary:      Effort: Pulmonary effort is normal.      Breath sounds: Normal breath sounds. Skin:     General: Skin is warm. Neurological:      General: No focal deficit present. Mental Status: He is alert. Psychiatric:         Mood and Affect: Mood normal.         Behavior: Behavior normal.       Vitals:    06/08/23 1305 06/08/23 1315   BP: (!) 142/68 (!) 148/60   Site: Left Upper Arm Left Upper Arm   Position: Sitting Sitting   Cuff Size: Medium Adult Medium Adult   Pulse: 68    Resp: 16    Temp: 97.2 °F (36.2 °C)    SpO2: 98%    Weight: 164 lb (74.4 kg)    Height: 5' 10\" (1.778 m)                  An electronic signature was used to authenticate this note.     --DARCI Shirley

## 2023-08-25 DIAGNOSIS — M25.50 DIFFUSE ARTHRALGIA: ICD-10-CM

## 2023-08-26 NOTE — TELEPHONE ENCOUNTER
Comments:     Last Office Visit (last PCP visit):   6/8/2023    Next Visit Date:  Future Appointments   Date Time Provider [address]   9/8/2023  1:00 PM [address]       **If hasn't been seen in over a year OR hasn't followed up according to last diabetes/ADHD visit, make appointment for patient before sending refill to provider.     Rx requested:  Requested Prescriptions     Pending Prescriptions Disp Refills    meloxicam (MOBIC) 7.5 MG tablet [Pharmacy Med Name: Meloxicam 7.5 MG Oral Tablet] 90 tablet 0     Sig: Take 1 tablet by mouth once daily    atorvastatin (LIPITOR) 40 MG tablet [Pharmacy Med Name: Atorvastatin Calcium 40 MG Oral Tablet] 90 tablet 0     Sig: Take 1 tablet by mouth once daily

## 2023-08-29 RX ORDER — ATORVASTATIN CALCIUM 40 MG/1
TABLET, FILM COATED ORAL
Qty: 90 TABLET | Refills: 0 | Status: SHIPPED | OUTPATIENT
Start: 2023-08-29

## 2023-08-29 RX ORDER — MELOXICAM 7.5 MG/1
TABLET ORAL
Qty: 90 TABLET | Refills: 0 | Status: SHIPPED | OUTPATIENT
Start: 2023-08-29

## 2023-09-05 SDOH — HEALTH STABILITY: PHYSICAL HEALTH: ON AVERAGE, HOW MANY MINUTES DO YOU ENGAGE IN EXERCISE AT THIS LEVEL?: 60 MIN

## 2023-09-05 SDOH — HEALTH STABILITY: PHYSICAL HEALTH: ON AVERAGE, HOW MANY DAYS PER WEEK DO YOU ENGAGE IN MODERATE TO STRENUOUS EXERCISE (LIKE A BRISK WALK)?: 7 DAYS

## 2023-09-05 ASSESSMENT — PATIENT HEALTH QUESTIONNAIRE - PHQ9
SUM OF ALL RESPONSES TO PHQ9 QUESTIONS 1 & 2: 0
2. FEELING DOWN, DEPRESSED OR HOPELESS: 0
SUM OF ALL RESPONSES TO PHQ QUESTIONS 1-9: 0
SUM OF ALL RESPONSES TO PHQ QUESTIONS 1-9: 0
1. LITTLE INTEREST OR PLEASURE IN DOING THINGS: 0
SUM OF ALL RESPONSES TO PHQ QUESTIONS 1-9: 0
SUM OF ALL RESPONSES TO PHQ QUESTIONS 1-9: 0

## 2023-09-05 ASSESSMENT — LIFESTYLE VARIABLES
HOW OFTEN DO YOU HAVE SIX OR MORE DRINKS ON ONE OCCASION: 1
HOW MANY STANDARD DRINKS CONTAINING ALCOHOL DO YOU HAVE ON A TYPICAL DAY: 0
HOW OFTEN DO YOU HAVE A DRINK CONTAINING ALCOHOL: MONTHLY OR LESS
HOW OFTEN DO YOU HAVE A DRINK CONTAINING ALCOHOL: 2
HOW MANY STANDARD DRINKS CONTAINING ALCOHOL DO YOU HAVE ON A TYPICAL DAY: PATIENT DOES NOT DRINK

## 2023-09-08 ENCOUNTER — OFFICE VISIT (OUTPATIENT)
Dept: INTERNAL MEDICINE | Age: 78
End: 2023-09-08

## 2023-09-08 VITALS
DIASTOLIC BLOOD PRESSURE: 62 MMHG | OXYGEN SATURATION: 97 % | HEART RATE: 66 BPM | RESPIRATION RATE: 16 BRPM | HEIGHT: 69 IN | WEIGHT: 170.2 LBS | SYSTOLIC BLOOD PRESSURE: 188 MMHG | BODY MASS INDEX: 25.21 KG/M2

## 2023-09-08 VITALS
RESPIRATION RATE: 16 BRPM | HEIGHT: 70 IN | TEMPERATURE: 97.5 F | DIASTOLIC BLOOD PRESSURE: 65 MMHG | BODY MASS INDEX: 21.15 KG/M2 | HEART RATE: 68 BPM | WEIGHT: 147.71 LBS | SYSTOLIC BLOOD PRESSURE: 138 MMHG | OXYGEN SATURATION: 98 %

## 2023-09-08 DIAGNOSIS — Z00.00 MEDICARE ANNUAL WELLNESS VISIT, SUBSEQUENT: ICD-10-CM

## 2023-09-08 DIAGNOSIS — I73.9 PAD (PERIPHERAL ARTERY DISEASE) (HCC): ICD-10-CM

## 2023-09-08 DIAGNOSIS — R53.1 WEAKNESS: ICD-10-CM

## 2023-09-08 DIAGNOSIS — E78.5 HYPERLIPIDEMIA, UNSPECIFIED HYPERLIPIDEMIA TYPE: Primary | ICD-10-CM

## 2023-09-08 DIAGNOSIS — R06.02 SOB (SHORTNESS OF BREATH): ICD-10-CM

## 2023-09-08 DIAGNOSIS — M79.10 MYALGIA: ICD-10-CM

## 2023-09-08 DIAGNOSIS — E11.9 TYPE 2 DIABETES MELLITUS WITHOUT COMPLICATION, WITHOUT LONG-TERM CURRENT USE OF INSULIN (HCC): ICD-10-CM

## 2023-09-08 DIAGNOSIS — I10 PRIMARY HYPERTENSION: ICD-10-CM

## 2023-09-08 DIAGNOSIS — E78.5 HYPERLIPIDEMIA, UNSPECIFIED HYPERLIPIDEMIA TYPE: ICD-10-CM

## 2023-09-08 LAB — HBA1C MFR BLD: 8.8 %

## 2023-09-08 NOTE — PATIENT INSTRUCTIONS
alone, but a vitamin D supplement is usually necessary to meet this goal.  When exposed to the sun, use a sunscreen that protects against both UVA and UVB radiation with an SPF of 30 or greater. Reapply every 2 to 3 hours or after sweating, drying off with a towel, or swimming. Always wear a seat belt when traveling in a car. Always wear a helmet when riding a bicycle or motorcycle.

## 2023-09-08 NOTE — PROGRESS NOTES
Medicare Annual Wellness Visit    Janeth Ruvalcaba is here for Medicare AWV, Diabetes (Patient is working on his diet. He seems unsure about a diabetic diet and when to eat with his pills. ), and Discuss Medications (Patient will not take the Atorvastatin. Says he is taking a root that is supposed to help with it. Says gives him the diarrhea. )    Assessment & Plan   Hyperlipidemia, unspecified hyperlipidemia type  Medicare annual wellness visit, subsequent  Type 2 diabetes mellitus without complication, without long-term current use of insulin ([facility])    Primary hypertension  Myalgia  SOB (shortness of breath)  Weakness  PAD (peripheral artery disease) ([facility])  - POCT glycosylated hemoglobin (Hb A1C)- stable at 8.8   - needs some diet improvements   - all chronic disease stable   - pt advised to use his scooter when winded, or having to go to the barn, to prevent falls   - continue all meds as prescribed   - patient has stopped many of his meds and I am unsure if he should still be on them, so I am contacting Dr Nura Haque about this. Recommendations for Preventive Services Due: see orders and patient instructions/AVS.  Recommended screening schedule for the next 5-10 years is provided to the patient in written form: see Patient Instructions/AVS.     Return in 6 months (on 3/8/2024). Subjective       Patient's complete Health Risk Assessment and screening values have been reviewed and are found in Flowsheets. The following problems were reviewed today and where indicated follow up appointments were made and/or referrals ordered.     Positive Risk Factor Screenings with Interventions:    Fall Risk:  Do you feel unsteady or are you worried about falling? : (!) yes  2 or more falls in past year?: no  Fall with injury in past year?: no     Interventions:    Patient declines any further evaluation or treatment                Hearing Screen:  Do you or your family notice any trouble with your hearing that hasn't been

## 2023-09-09 LAB
REASON FOR REJECTION: NORMAL
REJECTED TEST: NORMAL

## 2023-09-11 DIAGNOSIS — I10 PRIMARY HYPERTENSION: ICD-10-CM

## 2023-09-11 DIAGNOSIS — E78.5 HYPERLIPIDEMIA, UNSPECIFIED HYPERLIPIDEMIA TYPE: Primary | ICD-10-CM

## 2023-09-11 DIAGNOSIS — Z00.00 MEDICARE ANNUAL WELLNESS VISIT, SUBSEQUENT: ICD-10-CM

## 2023-09-11 DIAGNOSIS — I73.9 PAD (PERIPHERAL ARTERY DISEASE) (HCC): ICD-10-CM

## 2023-09-12 DIAGNOSIS — Z00.00 MEDICARE ANNUAL WELLNESS VISIT, SUBSEQUENT: ICD-10-CM

## 2023-09-12 DIAGNOSIS — I73.9 PAD (PERIPHERAL ARTERY DISEASE) (HCC): ICD-10-CM

## 2023-09-12 DIAGNOSIS — E78.5 HYPERLIPIDEMIA, UNSPECIFIED HYPERLIPIDEMIA TYPE: ICD-10-CM

## 2023-09-12 DIAGNOSIS — I10 PRIMARY HYPERTENSION: ICD-10-CM

## 2023-09-12 LAB
ALBUMIN SERPL-MCNC: 4.8 G/DL (ref 3.5–4.6)
ALP SERPL-CCNC: 64 U/L (ref 35–104)
ALT SERPL-CCNC: 28 U/L (ref 0–41)
ANION GAP SERPL CALCULATED.3IONS-SCNC: 15 MEQ/L (ref 9–15)
AST SERPL-CCNC: 24 U/L (ref 0–40)
BASOPHILS # BLD: 0 K/UL (ref 0–0.2)
BASOPHILS NFR BLD: 0.4 %
BILIRUB SERPL-MCNC: 0.4 MG/DL (ref 0.2–0.7)
BUN SERPL-MCNC: 17 MG/DL (ref 8–23)
CALCIUM SERPL-MCNC: 9.8 MG/DL (ref 8.5–9.9)
CHLORIDE SERPL-SCNC: 99 MEQ/L (ref 95–107)
CHOLEST SERPL-MCNC: 141 MG/DL (ref 0–199)
CO2 SERPL-SCNC: 25 MEQ/L (ref 20–31)
CREAT SERPL-MCNC: 1.03 MG/DL (ref 0.7–1.2)
EOSINOPHIL # BLD: 0.2 K/UL (ref 0–0.7)
EOSINOPHIL NFR BLD: 3.7 %
ERYTHROCYTE [DISTWIDTH] IN BLOOD BY AUTOMATED COUNT: 15.7 % (ref 11.5–14.5)
GLOBULIN SER CALC-MCNC: 3 G/DL (ref 2.3–3.5)
GLUCOSE SERPL-MCNC: 182 MG/DL (ref 70–99)
HCT VFR BLD AUTO: 36.7 % (ref 42–52)
HDLC SERPL-MCNC: 29 MG/DL (ref 40–59)
HGB BLD-MCNC: 12.3 G/DL (ref 14–18)
LDLC SERPL CALC-MCNC: 56 MG/DL (ref 0–129)
LYMPHOCYTES # BLD: 1.4 K/UL (ref 1–4.8)
LYMPHOCYTES NFR BLD: 26.9 %
MCH RBC QN AUTO: 30.1 PG (ref 27–31.3)
MCHC RBC AUTO-ENTMCNC: 33.7 % (ref 33–37)
MCV RBC AUTO: 89.5 FL (ref 79–92.2)
MONOCYTES # BLD: 0.9 K/UL (ref 0.2–0.8)
MONOCYTES NFR BLD: 16.6 %
NEUTROPHILS # BLD: 2.8 K/UL (ref 1.4–6.5)
NEUTS SEG NFR BLD: 52.4 %
PLATELET # BLD AUTO: 208 K/UL (ref 130–400)
POTASSIUM SERPL-SCNC: 4.5 MEQ/L (ref 3.4–4.9)
PROT SERPL-MCNC: 7.8 G/DL (ref 6.3–8)
RBC # BLD AUTO: 4.1 M/UL (ref 4.7–6.1)
SODIUM SERPL-SCNC: 139 MEQ/L (ref 135–144)
TRIGL SERPL-MCNC: 280 MG/DL (ref 0–150)
WBC # BLD AUTO: 5.3 K/UL (ref 4.8–10.8)

## 2023-09-13 LAB
IRON SATURATION: 23 % (ref 20–55)
IRON: 69 UG/DL (ref 59–158)
TOTAL IRON BINDING CAPACITY: 305 UG/DL (ref 250–450)
UNSATURATED IRON BINDING CAPACITY: 236 UG/DL (ref 112–347)

## 2023-09-14 NOTE — DISCHARGE SUMMARY
Send Summary:   Discharge Summary Providers:  Provider Role Provider Name   · Consulting Keke Felipe   · Attending Gerald Gonsales   · Consulting Marleni Calloway   · Consulting Luigi Franklin   · Consulting Melinda Lucero   · Primary Tomasa Conner       Note Recipients: Tomasa Conner, PAC - [phone]  []       Discharge:    Summary:   Admission Date: .31-May-2022 14:57:00   Discharge Date: 03-Jun-2022   Attending Physician at Discharge: Gerald Gonsales   Admission Reason: Dizziness   Final Discharge Diagnoses: Carotid artery stenosis  - bilateral  Chronic encephalopathy in the setting of advanced dementia  CVA in the past  Diabetes mellitus non-insulin-dependent  Chronic diarrhea  Ataxia  Dizziness   Procedures: none   Condition at Discharge: Satisfactory   Disposition at Discharge: .Home   Vital Signs:        T   P  R  BP   MAP  SpO2   Value  36.6  72  18  137/64   116  91%  Date/Time 6/3 7:32 6/3 8:27 6/2 15:33 6/3 8:27  6/3 7:32 6/3 7:32  Range  (36.1C - 37.5C )  (63 - 87 )  (18 - 18 )  (112 - 190 )/ (53 - 81 )  (76 - 116 )  (90% - 97% )  Highest temp of 37.5 C was recorded at 6/2 19:29    Date:            Weight/Scale Type:  Height:   01-Jun-2022 02:59  67  kg / bed  177.8  cm  Physical Exam:    General well-developed well-nourished older male resting in bed no acute distress  Head normocephalic nontraumatic  Eye extraocular movement intact, no scleral icterus  Cardiac regular rate and rhythm normal S1-2 no murmurs or gallop   respiratory no wheezing rales or rhonchi no use of accessory muscles  Abdomen ND, NT, no rebound or guarding bs present  Extremities no cyanosis no clubbing no edema  Neuro awake alert forgetful, follows commands  Psych calm cooperative appropriate  Skin warm dry no rash.    Hospital Course:    The patient is a 77-year-old male with Alzheimer's dementia, carotid artery stenosis, diabetes mellitus, hypertension, hyperlipidemia, syncope, CVA in the past who  was  referred to the emergency department with difficulty walking.  Per patient's daughter patient was seen at outside facility several days prior and was found to have carotid artery stenosis.  There were plans for intervention however procedure reportedly  abruptly canceled and to be done as an outpatient.  Patient had been having syncope and some falls as well.  On arrival patient was noted to be dynamically stable with a blood pressure of 138/65 heart rate of 68 temperature 36.4 labs revealed WC 6.9 hematocrit  34.9 platelets 301 sodium 133 potassium 3.9 creatinine 1.23.  CT of the head revealed global volume loss and chronic small vessel ischemic changes.  Chest x-ray revealed mild diffuse interstitial prominence.  Linear lucency overlying left inferolateral  chest wall.  Patient was evaluated by neurology.  MRI brain had no acute infarct or mass-effect.  Neuro recommended that patient continue on Plavix with supportive management.  PT OT evaluation which recommended 24-hour supervision given cognition.  Patient  was evaluated by vascular surgery as she was found to have bilateral carotid artery stenosis.  No surgical intervention was indicated at this time.  Recommending Plavix statin and outpatient follow-up for surveillance. Patient also seen by Gastroenterology  given chronic diarrhea.  While outpatient did have soft brown stools.  No acute intervention for was recommended.  Patient can follow-up with gastroenterology as needed as an outpatient.    45 mins spent on dc planning counseling and education.      Discharge Information:    and Continuing Care:   Lab Results - Pending:    None  Radiology Results - Pending: None   Discharge Instructions:    Activity:           activity as tolerated.    Nutrition/Diet:           low fat,  low sodium    Follow Up Appointments:    Follow-Up Appointment 01:           Physician/Dept/Service:   Tomasa Conner - PCP          Call to Schedule in:   2-3 days    Follow-Up  Appointment 02:           Physician/Dept/Service:   Luigi Franklin MD          Reason for Referral:   Vascular Surgery          Call to Schedule in:   6 months          Phone Number:   [phone]    Discharge Medications: Home Medication   metFORMIN - 1  orally 2 times a day  clopidogrel 75 mg oral tablet - 1 tab(s) orally once a day  atorvastatin 40 mg oral tablet - 1 tab(s) orally once a day      PRN Medication     DNR Status:   ·  Code Status Code Status order at time of discharge: Full Code       Electronic Signatures:  Gerald Gonsales)  (Signed 03-Jun-2022 12:41)   Authored: Send Summary, Summary Content, Ongoing Care,  DNR Status, Note Completion      Last Updated: 03-Jun-2022 12:41 by Gerald Gonsales)

## 2023-09-14 NOTE — PROGRESS NOTES
Service: General Internal Medicine      Subjective Data:   ROME EGAN is a 77 year old Male who is Hospital Day # 3.    Additional Information:    Patient resting comfortably in bed.  He denies any new complaints.  Patient's chart checked no records from [facility] available.  Carotid ultrasound noted bilateral  stenosis.  Will ask vascular surgery to evaluate.  Awaiting MRI.     Objective Data:     Objective Information:      T   P  R  BP   MAP  SpO2   Value  36.9  87  18  112/53   76  97%  Date/Time 6/2 8:18 6/2 8:18 6/1 13:46 6/2 8:18  6/2 8:18 6/2 8:18  Range  (36.3C - 37.5C )  (77 - 96 )  (16 - 18 )  (112 - 147 )/ (53 - 75 )  (76 - 96 )  (90% - 97% )  Highest temp of 37.5 C was recorded at 6/1 13:46      Pain reported at 6/1 21:33: 0 = None    Physical Exam Narrative:  ·  Physical Exam:    Vitals reviewed  General well-developed well-nourished older male resting in bed no acute distress  Head normocephalic nontraumatic  Eye extraocular movement intact, no scleral icterus  Cardiac regular rate and rhythm normal S1-2 no murmurs or gallop   respiratory no wheezing rales or rhonchi no use of accessory muscles  Abdomen ND, NT, no rebound or guarding bs present  Extremities no cyanosis no clubbing no edema  Neuro awake alert forgetful, follows commands  Psych calm cooperative appropriate  Skin warm dry no rash.      Medication:    Medications:          Continuous Medications       --------------------------------    1. Sodium Chloride 0.9% Infusion:  1000  mL  IntraVenous  <Continuous>    2. Sodium Chloride 0.9% Infusion:  1000  mL  IntraVenous  <Continuous>         Scheduled Medications       --------------------------------    1. Clopidogrel:  75  mg  Oral  Daily    2. Heparin SubCutaneous:  5000  unit(s)  SubCutaneous  Every 8 Hours    3. Insulin Lispro Moderate Corrective Scale:  unit(s)  SubCutaneous  3 Times a Day Before Meals         PRN Medications       --------------------------------    1.  Acetaminophen:  650  mg  Oral  Every 4 Hours    2. Dextrose 50% in Water Injectable:  25  gram(s)  IntraVenous Push  Every 15 Minutes    3. Glucagon Injectable:  1  mg  IntraMuscular  Every 15 Minutes    4. Ondansetron Injectable:  4  mg  IntraVenous Push  Every 4 Hours         Conditional Medication Orders       --------------------------------    1. Dextrose 10% in Water Infusion:  500  mL  IntraVenous  <Continuous>      Recent Lab Results:    Results:        BMP: 6/2/2022 06:11  NA+        Cl-     BUN  /                         137    102    11  /  --------------------------------  Glucose                ---------------------------  155 H    K+     HCO3-   Creat \                         3.3 L 25    0.94  \  Calcium : 7.9 L    Anion Gap : 13      Radiology Results:    Results:        Impression:    1. Right internal carotid stenosis of 70% or greater.  2. Left internal carotid stenosis of at least 50-69%.  3. Grayscale and color Doppler narrowing in the mid left common  carotid artery, with focal elevation of peak systolic velocity  suspicious for hemodynamically significant stenosis. This is  incompletely assessed on the current study; a tandem stenosis in the  left common carotid artery could result in underestimation of the  degree of stenosis further distally in the left internal carotid. CT  angiogram of the soft tissues of the neck is recommended to more  thoroughly assess this finding.     The velocity criteria are extrapolated fromdiameter data as defined  by the Society of Radiologists in Ultrasound Consensus Conference  Radiology 2003; 229;340-346.     Ultrasound Carotid Bilateral Duplex [Jun 2 2022  8:51AM]      Impression:    Global volume loss and chronic small vessel ischemic change. No  evidence of acute intracranial hemorrhage.     If persistent concern, MRI can be considered.     CT Head without Contrast [May 31 2022  6:12PM]        Assessment and Plan:   Code Status:  ·  Code Status  Full Code     Assessment:    The patient is a 77-year-old male with Alzheimer's dementia, carotid artery stenosis, diabetes mellitus, hypertension, hyperlipidemia, syncope, CVA in the past who was referred  to the emergency department with difficulty walking.  Per patient's daughter patient was seen at outside facility several days prior and was found to have carotid artery stenosis.  There were plans for intervention however procedure reportedly abruptly  canceled and to be done as an outpatient.  Patient had been having syncope and some falls as well.  On arrival patient was noted to be dynamically stable with a blood pressure of 138/65 heart rate of 68 temperature 36.4 labs revealed WC 6.9 hematocrit  34.9 platelets 301 sodium 133 potassium 3.9 creatinine 1.23.  CT of the head revealed global volume loss and chronic small vessel ischemic changes.  Chest x-ray revealed mild diffuse interstitial prominence.  Linear lucency overlying left inferolateral  chest wall.    Ataxia  Dizziness  Neuro on board  MRI  PT OT    Carotid artery stenosis - bilateral  Carotid ultrasound noted - will ask vascular to eval    Chronic encephalopathy in the setting of advanced dementia  Status post bedside swallow eval passed  Reorient as needed    CVA in the past  Secondary prevention    Diabetes mellitus non-insulin-dependent  Hold oral agents  SSI while in-house    Dispo: plan to return to family at dc      Electronic Signatures:  Gerald Gonsales)  (Signed 02-Jun-2022 10:22)   Authored: Service, Subjective Data, Objective Data, Assessment  and Plan, Note Completion      Last Updated: 02-Jun-2022 10:22 by Gerald Gonsales)

## 2023-09-14 NOTE — PROGRESS NOTES
Service: Neurology     Subjective Data:   ROME EGAN is a 77 year old Male who is Hospital Day # 4.     s.  Patient is feeling a lot better.  Denies having any diarrhea.  Does complain of abdominal discomfort no new weakness numbness or any dizziness.  No history of any seizure or seizure-like activity.    Objective Data:     Objective Information:      T   P  R  BP   MAP  SpO2   Value  36.6  72  18  137/64   116  91%  Date/Time 6/3 7:32 6/3 8:27 6/2 15:33 6/3 8:27  6/3 7:32 6/3 7:32  Range  (36.1C - 37.5C )  (63 - 87 )  (18 - 18 )  (112 - 190 )/ (53 - 81 )  (76 - 116 )  (90% - 97% )  Highest temp of 37.5 C was recorded at 6/2 19:29      Pain reported at 6/3 8:27: 0 = None    Physical Exam by System:    Constitutional: Patient is awake alert and oriented  to place person and time.  Memory is impaired.  Not in acute distress   Eyes: PERRL, EOMI, clear sclera   ENMT: mucous membranes moist, no apparent injury,  no lesions seen   Head/Neck: Neck supple, no apparent injury, thyroid  without mass or tenderness, No JVD, trachea midline, no bruits   Respiratory/Thorax: Patent airways, CTAB, normal  breath sounds with good chest expansion, thorax symmetric   Cardiovascular: Regular, rate and rhythm, no murmurs,  2+ equal pulses of the extremities, normal S 1and S 2   Gastrointestinal: Nondistended, soft, non-tender,  no rebound tenderness or guarding, no masses palpable, no organomegaly, +BS, no bruits   Musculoskeletal: ROM intact, no joint swelling, normal  strength   Extremities: normal extremities, no cyanosis edema,  contusions or wounds, no clubbing   Neurological: Patient was sleeping but easily arousable  answering simple questions oriented to place and himself but was confused about the date and the month.  Speech was clear without any paraphasic error.  Memory was impaired short-term as well as long-term.  Attention span judgment concentration was poor.   No hallucinations or delusions.    Cranial nerve  revealed normal extraocular movement and face was symmetrical with tongue in the midline and patient was able to elevate the palate and shoulder without difficulty.  Sensations were normal.    Pupils were equal and reactive.    Motor exam revealed normal decreased tone with generalized wasting and patient was able to move all 4 extremities.    Reflexes were bilaterally hypoactive with plantar equivocal response bilaterally.    Coordination Station and gait with a slightly wide-based ataxic gait and he required assistance for ambulation.    Sensory exam was intact to light touch and pinprick.   Lymphatic: No significant lymphadenopathy   Psychological: Appropriate mood and behavior   Skin: Warm and dry, no lesions, no rashes     Medication:    Medications:          Continuous Medications       --------------------------------    1. Sodium Chloride 0.9% Infusion:  1000  mL  IntraVenous  <Continuous>    2. Sodium Chloride 0.9% Infusion:  1000  mL  IntraVenous  <Continuous>         Scheduled Medications       --------------------------------    1. Clopidogrel:  75  mg  Oral  Daily    2. Heparin SubCutaneous:  5000  unit(s)  SubCutaneous  Every 8 Hours    3. Insulin Lispro Moderate Corrective Scale:  unit(s)  SubCutaneous  3 Times a Day Before Meals         PRN Medications       --------------------------------    1. Acetaminophen:  650  mg  Oral  Every 4 Hours    2. Dextrose 50% in Water Injectable:  25  gram(s)  IntraVenous Push  Every 15 Minutes    3. Glucagon Injectable:  1  mg  IntraMuscular  Every 15 Minutes    4. Ondansetron Injectable:  4  mg  IntraVenous Push  Every 4 Hours         Conditional Medication Orders       --------------------------------    1. Dextrose 10% in Water Infusion:  500  mL  IntraVenous  <Continuous>      Recent Lab Results:    Results:        BMP: 6/3/2022 06:35  NA+        Cl-     BUN  /                         139    107    9  /  --------------------------------  Glucose                 ---------------------------  153 H    K+     HCO3-   Creat \                         3.3 L 24    0.96  \  Calcium : 8.0 L    Anion Gap : 11        I have reviewed these laboratory results:    Basic Metabolic Panel  02-Jun-2022 06:11:00      Result Value    Glucose, Serum  155   H   NA  137    K  3.3   L   CL  102    Bicarbonate, Serum  25    Anion Gap, Serum  13    BUN  11    CREAT  0.94    GFR Male  83    Calcium, Serum  7.9   L     Hemoglobin A1C, Level  02-Jun-2022 06:10:00      Result Value    Estimated Average Glucose  223    Hemoglobin A1C, Level  9.4 Diagnosis of Diabetes-Adults   Non-Diabetic: < or = 5.6%   Increased risk for developing diabetes: 5.7-6.4%   Diagnostic of diabetes: > or = 6.5%  .       Monitoring of Diabetes                Age (y)     Therapeutic Goal (%)   Adults:          >1   A       Radiology Results:    Results:        Impression:    No acute intracranial infarct, or mass effect.     Mild degree of microangiopathy. Old infarct within the left basal  ganglia.     MRI Brain without Contrast [Jun 2 2022  1:38PM]      Impression:    1. Right internal carotid stenosis of 70% or greater.  2. Left internal carotid stenosis of at least 50-69%.  3. Grayscale and color Doppler narrowing in the mid left common  carotid artery, with focal elevation of peak systolic velocity  suspicious for hemodynamically significant stenosis. This is  incompletely assessed on the current study; a tandem stenosis in the  left common carotid artery could result in underestimation of the  degree of stenosis further distally in the left internal carotid. CT  angiogram of the soft tissues of the neck is recommended to more  thoroughly assess this finding.     The velocity criteria are extrapolated fromdiameter data as defined  by the Society of Radiologists in Ultrasound Consensus Conference  Radiology 2003; 229;340-346.     Ultrasound Carotid Bilateral Duplex [Jun 2 2022  8:51AM]      Assessment and Plan:         Admitting Dx:   Chest pain: Onset Date: 01-Jun-2022, Entered Date: 01-Jun-2022  07:59       Additional Dx:   Carotid stenosis, asymptomatic: Entered Date: 02-Jun-2022  18:41   CVA, old, ataxia: Entered Date: 02-Jun-2022 18:41   Acute encephalopathy: Entered Date: 01-Jun-2022 11:35   Type 2 diabetes mellitus without complication: Entered Date:  01-Jun-2022 07:48   Primary hypertension: Entered Date: 01-Jun-2022 07:48    Code Status:  ·  Code Status Full Code     Assessment:    Impression.  1.  History of acute change in mental status most likely acute encephalopathy due to dehydration probably from chronic diarrhea.    2.  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    3.  History of abdominal pain with diarrhea.    Plan.  Continue with supportive care for now and treat underlying medical condition continue the PT OT evaluation and okay to discharge when medically stable.  Patient can follow-up with his primary care physician and can see us as needed.  [phone].    I had a long discussion about the signs and the risk factor for CVA and the trigger factors of the CVA to the brain risk and the precautions which he understood.    Will sign off.      Electronic Signatures:  Edwin Gaitan)  (Signed 03-Jun-2022 12:48)   Authored: Service, Subjective Data, Objective Data, Assessment  and Plan, Note Completion      Last Updated: 03-Jun-2022 12:48 by Edwin Gaitan)

## 2023-09-22 ENCOUNTER — APPOINTMENT (OUTPATIENT)
Dept: GENERAL RADIOLOGY | Age: 78
End: 2023-09-22
Payer: MEDICARE

## 2023-09-22 ENCOUNTER — APPOINTMENT (OUTPATIENT)
Dept: CT IMAGING | Age: 78
End: 2023-09-22
Payer: MEDICARE

## 2023-09-22 ENCOUNTER — HOSPITAL ENCOUNTER (OUTPATIENT)
Age: 78
Setting detail: OBSERVATION
Discharge: HOME | End: 2023-09-25
Attending: EMERGENCY MEDICINE | Admitting: INTERNAL MEDICINE
Payer: MEDICARE

## 2023-09-22 ENCOUNTER — APPOINTMENT (OUTPATIENT)
Age: 78
End: 2023-09-22
Attending: INTERNAL MEDICINE
Payer: MEDICARE

## 2023-09-22 ENCOUNTER — APPOINTMENT (OUTPATIENT)
Dept: ULTRASOUND IMAGING | Age: 78
End: 2023-09-22
Payer: MEDICARE

## 2023-09-22 DIAGNOSIS — R42 LIGHTHEADEDNESS: ICD-10-CM

## 2023-09-22 DIAGNOSIS — R55 SYNCOPE AND COLLAPSE: ICD-10-CM

## 2023-09-22 DIAGNOSIS — R60.0 LOCALIZED EDEMA: ICD-10-CM

## 2023-09-22 DIAGNOSIS — R42 DIZZINESS: Primary | ICD-10-CM

## 2023-09-22 DIAGNOSIS — E86.0 DEHYDRATION: ICD-10-CM

## 2023-09-22 PROBLEM — N17.9 AKI (ACUTE KIDNEY INJURY) (HCC): Status: ACTIVE | Noted: 2023-09-22

## 2023-09-22 LAB
ALBUMIN SERPL-MCNC: 4.5 G/DL (ref 3.5–4.6)
ALP SERPL-CCNC: 68 U/L (ref 35–104)
ALT SERPL-CCNC: 81 U/L (ref 0–41)
ANION GAP SERPL CALCULATED.3IONS-SCNC: 14 MEQ/L (ref 9–15)
AST SERPL-CCNC: 62 U/L (ref 0–40)
BACTERIA URNS QL MICRO: NEGATIVE /HPF
BASOPHILS # BLD: 0 K/UL (ref 0–0.1)
BASOPHILS NFR BLD: 0.3 % (ref 0.2–1.2)
BILIRUB SERPL-MCNC: 0.7 MG/DL (ref 0.2–0.7)
BILIRUB UR QL STRIP: NEGATIVE
BNP BLD-MCNC: 184 PG/ML
BUN SERPL-MCNC: 30 MG/DL (ref 8–23)
CALCIUM SERPL-MCNC: 9.4 MG/DL (ref 8.5–9.9)
CHLORIDE SERPL-SCNC: 92 MEQ/L (ref 95–107)
CLARITY UR: CLEAR
CO2 SERPL-SCNC: 24 MEQ/L (ref 20–31)
COLOR UR: YELLOW
CREAT SERPL-MCNC: 1.5 MG/DL (ref 0.7–1.2)
D DIMER PPP FEU-MCNC: 2.6 MG/L FEU (ref 0–0.5)
ECHO AO ROOT DIAM: 3 CM
ECHO AO ROOT INDEX: 1.6 CM/M2
ECHO AV AREA PEAK VELOCITY: 2.7 CM2
ECHO AV AREA VTI: 2.2 CM2
ECHO AV AREA/BSA PEAK VELOCITY: 1.4 CM2/M2
ECHO AV AREA/BSA VTI: 1.2 CM2/M2
ECHO AV MEAN GRADIENT: 12 MMHG
ECHO AV MEAN VELOCITY: 1.7 M/S
ECHO AV PEAK GRADIENT: 21 MMHG
ECHO AV PEAK VELOCITY: 2.3 M/S
ECHO AV VELOCITY RATIO: 0.61
ECHO AV VTI: 44.1 CM
ECHO BSA: 1.88 M2
ECHO LA DIAMETER INDEX: 1.86 CM/M2
ECHO LA DIAMETER: 3.5 CM
ECHO LA TO AORTIC ROOT RATIO: 1.17
ECHO LA VOL 2C: 44 ML (ref 18–58)
ECHO LA VOL 2C: 47 ML (ref 18–58)
ECHO LA VOL 4C: 45 ML (ref 18–58)
ECHO LA VOL 4C: 48 ML (ref 18–58)
ECHO LA VOLUME AREA LENGTH: 51 ML
ECHO LA VOLUME INDEX AREA LENGTH: 27 ML/M2 (ref 16–34)
ECHO LV E' LATERAL VELOCITY: 6 CM/S
ECHO LV E' SEPTAL VELOCITY: 5 CM/S
ECHO LV EDV A4C: 50 ML
ECHO LV EDV INDEX A4C: 27 ML/M2
ECHO LV EJECTION FRACTION A4C: 62 %
ECHO LV ESV A4C: 19 ML
ECHO LV ESV INDEX A4C: 10 ML/M2
ECHO LV INTERNAL DIMENSION DIASTOLE INDEX: 2.02 CM/M2
ECHO LV INTERNAL DIMENSION DIASTOLIC: 3.8 CM (ref 4.2–5.9)
ECHO LV IVSD: 1.2 CM (ref 0.6–1)
ECHO LV MASS 2D: 163 G (ref 88–224)
ECHO LV MASS INDEX 2D: 86.7 G/M2 (ref 49–115)
ECHO LV POSTERIOR WALL DIASTOLIC: 1.3 CM (ref 0.6–1)
ECHO LV RELATIVE WALL THICKNESS RATIO: 0.68
ECHO LVOT AREA: 4.5 CM2
ECHO LVOT AV VTI INDEX: 0.49
ECHO LVOT DIAM: 2.4 CM
ECHO LVOT MEAN GRADIENT: 4 MMHG
ECHO LVOT PEAK GRADIENT: 7 MMHG
ECHO LVOT PEAK VELOCITY: 1.4 M/S
ECHO LVOT STROKE VOLUME INDEX: 52.2 ML/M2
ECHO LVOT SV: 98.1 ML
ECHO LVOT VTI: 21.7 CM
ECHO MV A VELOCITY: 1.1 M/S
ECHO MV AREA PHT: 3.1 CM2
ECHO MV E DECELERATION TIME (DT): 267.5 MS
ECHO MV E VELOCITY: 0.7 M/S
ECHO MV E/A RATIO: 0.64
ECHO MV E/E' LATERAL: 11.67
ECHO MV E/E' RATIO (AVERAGED): 12.83
ECHO MV E/E' SEPTAL: 14
ECHO MV PRESSURE HALF TIME (PHT): 71.8 MS
ECHO PV MAX VELOCITY: 1 M/S
ECHO PV PEAK GRADIENT: 4 MMHG
ECHO RV INTERNAL DIMENSION: 3.2 CM
ECHO TV REGURGITANT MAX VELOCITY: 2.36 M/S
ECHO TV REGURGITANT PEAK GRADIENT: 22 MMHG
EKG ATRIAL RATE: 84 BPM
EKG P AXIS: 37 DEGREES
EKG P-R INTERVAL: 162 MS
EKG Q-T INTERVAL: 360 MS
EKG QRS DURATION: 84 MS
EKG QTC CALCULATION (BAZETT): 425 MS
EKG R AXIS: -14 DEGREES
EKG T AXIS: 39 DEGREES
EKG VENTRICULAR RATE: 84 BPM
EOSINOPHIL # BLD: 0.1 K/UL (ref 0–0.5)
EOSINOPHIL NFR BLD: 3 % (ref 0.8–7)
EPI CELLS #/AREA URNS HPF: NORMAL /HPF
ERYTHROCYTE [DISTWIDTH] IN BLOOD BY AUTOMATED COUNT: 14.5 % (ref 11.6–14.4)
GLOBULIN SER CALC-MCNC: 3.4 G/DL (ref 2.3–3.5)
GLUCOSE BLD-MCNC: 267 MG/DL (ref 70–99)
GLUCOSE BLD-MCNC: 284 MG/DL (ref 70–99)
GLUCOSE BLD-MCNC: 312 MG/DL (ref 70–99)
GLUCOSE SERPL-MCNC: 235 MG/DL (ref 70–99)
GLUCOSE UR STRIP-MCNC: 250 MG/DL
HBA1C MFR BLD: 9.3 % (ref 4.8–5.9)
HCT VFR BLD AUTO: 33.8 % (ref 42–52)
HGB BLD-MCNC: 11.6 G/DL (ref 13.7–17.5)
HGB UR QL STRIP: ABNORMAL
IMM GRANULOCYTES # BLD: 0.1 K/UL
IMM GRANULOCYTES NFR BLD: 1.9 %
INR PPP: 1
KETONES UR STRIP-MCNC: NEGATIVE MG/DL
LACTATE BLDV-SCNC: 2 MMOL/L (ref 0.5–2.2)
LEUKOCYTE ESTERASE UR QL STRIP: NEGATIVE
LYMPHOCYTES # BLD: 0.7 K/UL (ref 1.3–3.6)
LYMPHOCYTES NFR BLD: 19 %
MAGNESIUM SERPL-MCNC: 1.9 MG/DL (ref 1.7–2.4)
MCH RBC QN AUTO: 29.4 PG (ref 25.7–32.2)
MCHC RBC AUTO-ENTMCNC: 34.3 % (ref 32.3–36.5)
MCV RBC AUTO: 85.8 FL (ref 79–92.2)
MONOCYTES # BLD: 0.5 K/UL (ref 0.3–0.8)
MONOCYTES NFR BLD: 14 % (ref 5.3–12.2)
NEUTROPHILS # BLD: 2.4 K/UL (ref 1.8–5.4)
NEUTS BAND NFR BLD MANUAL: 31 %
NEUTS SEG NFR BLD: 33 % (ref 34–67.9)
NITRITE UR QL STRIP: NEGATIVE
PERFORMED ON: ABNORMAL
PH UR STRIP: 5.5 [PH] (ref 5–9)
PLATELET # BLD AUTO: 161 K/UL (ref 163–337)
POTASSIUM SERPL-SCNC: 4 MEQ/L (ref 3.4–4.9)
PROT SERPL-MCNC: 7.9 G/DL (ref 6.3–8)
PROT UR STRIP-MCNC: 30 MG/DL
PROTHROMBIN TIME: 13 SEC (ref 12.3–14.9)
RBC # BLD AUTO: 3.94 M/UL (ref 4.63–6.08)
RBC #/AREA URNS HPF: NORMAL /HPF (ref 0–2)
SARS-COV-2 RDRP RESP QL NAA+PROBE: NOT DETECTED
SODIUM SERPL-SCNC: 130 MEQ/L (ref 135–144)
SP GR UR STRIP: 1.01 (ref 1–1.03)
TROPONIN T SERPL-MCNC: 0.01 NG/ML (ref 0–0.01)
TROPONIN T SERPL-MCNC: <0.01 NG/ML (ref 0–0.01)
TROPONIN T SERPL-MCNC: <0.01 NG/ML (ref 0–0.01)
UROBILINOGEN UR STRIP-ACNC: 0.2 E.U./DL
WBC # BLD AUTO: 3.7 K/UL (ref 4.2–9)
WBC #/AREA URNS HPF: NORMAL /HPF (ref 0–5)

## 2023-09-22 PROCEDURE — 93306 TTE W/DOPPLER COMPLETE: CPT | Performed by: INTERNAL MEDICINE

## 2023-09-22 PROCEDURE — 6370000000 HC RX 637 (ALT 250 FOR IP): Performed by: INTERNAL MEDICINE

## 2023-09-22 PROCEDURE — 96360 HYDRATION IV INFUSION INIT: CPT

## 2023-09-22 PROCEDURE — 80053 COMPREHEN METABOLIC PANEL: CPT

## 2023-09-22 PROCEDURE — 87635 SARS-COV-2 COVID-19 AMP PRB: CPT

## 2023-09-22 PROCEDURE — 83880 ASSAY OF NATRIURETIC PEPTIDE: CPT

## 2023-09-22 PROCEDURE — 93005 ELECTROCARDIOGRAM TRACING: CPT

## 2023-09-22 PROCEDURE — 96361 HYDRATE IV INFUSION ADD-ON: CPT

## 2023-09-22 PROCEDURE — 6370000000 HC RX 637 (ALT 250 FOR IP): Performed by: EMERGENCY MEDICINE

## 2023-09-22 PROCEDURE — 94640 AIRWAY INHALATION TREATMENT: CPT

## 2023-09-22 PROCEDURE — 2580000003 HC RX 258: Performed by: INTERNAL MEDICINE

## 2023-09-22 PROCEDURE — 70450 CT HEAD/BRAIN W/O DYE: CPT

## 2023-09-22 PROCEDURE — 85025 COMPLETE CBC W/AUTO DIFF WBC: CPT

## 2023-09-22 PROCEDURE — 83605 ASSAY OF LACTIC ACID: CPT

## 2023-09-22 PROCEDURE — 93970 EXTREMITY STUDY: CPT

## 2023-09-22 PROCEDURE — 6360000004 HC RX CONTRAST MEDICATION: Performed by: EMERGENCY MEDICINE

## 2023-09-22 PROCEDURE — 71045 X-RAY EXAM CHEST 1 VIEW: CPT

## 2023-09-22 PROCEDURE — 83036 HEMOGLOBIN GLYCOSYLATED A1C: CPT

## 2023-09-22 PROCEDURE — 83735 ASSAY OF MAGNESIUM: CPT

## 2023-09-22 PROCEDURE — 6360000002 HC RX W HCPCS: Performed by: INTERNAL MEDICINE

## 2023-09-22 PROCEDURE — 96372 THER/PROPH/DIAG INJ SC/IM: CPT

## 2023-09-22 PROCEDURE — G0378 HOSPITAL OBSERVATION PER HR: HCPCS

## 2023-09-22 PROCEDURE — 84484 ASSAY OF TROPONIN QUANT: CPT

## 2023-09-22 PROCEDURE — 99285 EMERGENCY DEPT VISIT HI MDM: CPT

## 2023-09-22 PROCEDURE — 85379 FIBRIN DEGRADATION QUANT: CPT

## 2023-09-22 PROCEDURE — 93306 TTE W/DOPPLER COMPLETE: CPT

## 2023-09-22 PROCEDURE — 81001 URINALYSIS AUTO W/SCOPE: CPT

## 2023-09-22 PROCEDURE — 71275 CT ANGIOGRAPHY CHEST: CPT

## 2023-09-22 PROCEDURE — 2580000003 HC RX 258: Performed by: EMERGENCY MEDICINE

## 2023-09-22 PROCEDURE — 85610 PROTHROMBIN TIME: CPT

## 2023-09-22 PROCEDURE — 93010 ELECTROCARDIOGRAM REPORT: CPT | Performed by: INTERNAL MEDICINE

## 2023-09-22 PROCEDURE — 36415 COLL VENOUS BLD VENIPUNCTURE: CPT

## 2023-09-22 PROCEDURE — 87040 BLOOD CULTURE FOR BACTERIA: CPT

## 2023-09-22 RX ORDER — LANOLIN ALCOHOL/MO/W.PET/CERES
325 CREAM (GRAM) TOPICAL 2 TIMES DAILY
COMMUNITY

## 2023-09-22 RX ORDER — 0.9 % SODIUM CHLORIDE 0.9 %
500 INTRAVENOUS SOLUTION INTRAVENOUS ONCE
Status: COMPLETED | OUTPATIENT
Start: 2023-09-22 | End: 2023-09-22

## 2023-09-22 RX ORDER — ACETAMINOPHEN 325 MG/1
650 TABLET ORAL EVERY 6 HOURS PRN
Status: DISCONTINUED | OUTPATIENT
Start: 2023-09-22 | End: 2023-09-25 | Stop reason: HOSPADM

## 2023-09-22 RX ORDER — ATORVASTATIN CALCIUM 40 MG/1
40 TABLET, FILM COATED ORAL DAILY
Status: DISCONTINUED | OUTPATIENT
Start: 2023-09-22 | End: 2023-09-22

## 2023-09-22 RX ORDER — INSULIN LISPRO 100 [IU]/ML
0-4 INJECTION, SOLUTION INTRAVENOUS; SUBCUTANEOUS NIGHTLY
Status: DISCONTINUED | OUTPATIENT
Start: 2023-09-22 | End: 2023-09-25 | Stop reason: HOSPADM

## 2023-09-22 RX ORDER — PROMETHAZINE HYDROCHLORIDE 12.5 MG/1
12.5 TABLET ORAL EVERY 6 HOURS PRN
Status: DISCONTINUED | OUTPATIENT
Start: 2023-09-22 | End: 2023-09-25 | Stop reason: HOSPADM

## 2023-09-22 RX ORDER — SODIUM CHLORIDE 9 MG/ML
INJECTION, SOLUTION INTRAVENOUS CONTINUOUS
Status: DISCONTINUED | OUTPATIENT
Start: 2023-09-22 | End: 2023-09-23

## 2023-09-22 RX ORDER — SODIUM CHLORIDE 9 MG/ML
INJECTION, SOLUTION INTRAVENOUS PRN
Status: DISCONTINUED | OUTPATIENT
Start: 2023-09-22 | End: 2023-09-25 | Stop reason: HOSPADM

## 2023-09-22 RX ORDER — SODIUM CHLORIDE 0.9 % (FLUSH) 0.9 %
10 SYRINGE (ML) INJECTION EVERY 12 HOURS SCHEDULED
Status: DISCONTINUED | OUTPATIENT
Start: 2023-09-22 | End: 2023-09-25 | Stop reason: HOSPADM

## 2023-09-22 RX ORDER — IPRATROPIUM BROMIDE AND ALBUTEROL SULFATE 2.5; .5 MG/3ML; MG/3ML
1 SOLUTION RESPIRATORY (INHALATION) ONCE
Status: COMPLETED | OUTPATIENT
Start: 2023-09-22 | End: 2023-09-22

## 2023-09-22 RX ORDER — SODIUM CHLORIDE 0.9 % (FLUSH) 0.9 %
10 SYRINGE (ML) INJECTION PRN
Status: DISCONTINUED | OUTPATIENT
Start: 2023-09-22 | End: 2023-09-25 | Stop reason: HOSPADM

## 2023-09-22 RX ORDER — ATORVASTATIN CALCIUM 40 MG/1
40 TABLET, FILM COATED ORAL NIGHTLY
Status: DISCONTINUED | OUTPATIENT
Start: 2023-09-22 | End: 2023-09-25 | Stop reason: HOSPADM

## 2023-09-22 RX ORDER — POLYETHYLENE GLYCOL 3350 17 G/17G
17 POWDER, FOR SOLUTION ORAL DAILY PRN
Status: DISCONTINUED | OUTPATIENT
Start: 2023-09-22 | End: 2023-09-25 | Stop reason: HOSPADM

## 2023-09-22 RX ORDER — ACETAMINOPHEN 650 MG/1
650 SUPPOSITORY RECTAL EVERY 6 HOURS PRN
Status: DISCONTINUED | OUTPATIENT
Start: 2023-09-22 | End: 2023-09-25 | Stop reason: HOSPADM

## 2023-09-22 RX ORDER — OXYCODONE HYDROCHLORIDE 5 MG/1
5 TABLET ORAL EVERY 4 HOURS PRN
Status: DISCONTINUED | OUTPATIENT
Start: 2023-09-22 | End: 2023-09-25 | Stop reason: HOSPADM

## 2023-09-22 RX ORDER — SODIUM CHLORIDE 9 MG/ML
INJECTION, SOLUTION INTRAVENOUS CONTINUOUS
Status: DISCONTINUED | OUTPATIENT
Start: 2023-09-22 | End: 2023-09-22

## 2023-09-22 RX ORDER — DEXTROSE MONOHYDRATE 100 MG/ML
INJECTION, SOLUTION INTRAVENOUS CONTINUOUS PRN
Status: DISCONTINUED | OUTPATIENT
Start: 2023-09-22 | End: 2023-09-25 | Stop reason: HOSPADM

## 2023-09-22 RX ORDER — ENOXAPARIN SODIUM 100 MG/ML
40 INJECTION SUBCUTANEOUS DAILY
Status: DISCONTINUED | OUTPATIENT
Start: 2023-09-22 | End: 2023-09-24

## 2023-09-22 RX ORDER — INSULIN LISPRO 100 [IU]/ML
0-8 INJECTION, SOLUTION INTRAVENOUS; SUBCUTANEOUS
Status: DISCONTINUED | OUTPATIENT
Start: 2023-09-22 | End: 2023-09-25 | Stop reason: HOSPADM

## 2023-09-22 RX ORDER — ONDANSETRON 2 MG/ML
4 INJECTION INTRAMUSCULAR; INTRAVENOUS EVERY 6 HOURS PRN
Status: DISCONTINUED | OUTPATIENT
Start: 2023-09-22 | End: 2023-09-25 | Stop reason: HOSPADM

## 2023-09-22 RX ADMIN — SODIUM CHLORIDE: 9 INJECTION, SOLUTION INTRAVENOUS at 08:30

## 2023-09-22 RX ADMIN — ACETAMINOPHEN 650 MG: 325 TABLET ORAL at 20:33

## 2023-09-22 RX ADMIN — SODIUM CHLORIDE: 9 INJECTION, SOLUTION INTRAVENOUS at 13:03

## 2023-09-22 RX ADMIN — INSULIN LISPRO 6 UNITS: 100 INJECTION, SOLUTION INTRAVENOUS; SUBCUTANEOUS at 12:32

## 2023-09-22 RX ADMIN — IOPAMIDOL 75 ML: 755 INJECTION, SOLUTION INTRAVENOUS at 09:04

## 2023-09-22 RX ADMIN — INSULIN LISPRO 4 UNITS: 100 INJECTION, SOLUTION INTRAVENOUS; SUBCUTANEOUS at 17:40

## 2023-09-22 RX ADMIN — ATORVASTATIN CALCIUM 40 MG: 40 TABLET, FILM COATED ORAL at 20:33

## 2023-09-22 RX ADMIN — IPRATROPIUM BROMIDE AND ALBUTEROL SULFATE 1 DOSE: .5; 3 SOLUTION RESPIRATORY (INHALATION) at 08:34

## 2023-09-22 RX ADMIN — SODIUM CHLORIDE 500 ML: 9 INJECTION, SOLUTION INTRAVENOUS at 08:31

## 2023-09-22 RX ADMIN — ENOXAPARIN SODIUM 40 MG: 100 INJECTION SUBCUTANEOUS at 13:04

## 2023-09-22 ASSESSMENT — ENCOUNTER SYMPTOMS
VOMITING: 0
FACIAL SWELLING: 0
ABDOMINAL PAIN: 0
CHOKING: 0
EYE PAIN: 0
VOICE CHANGE: 0
SORE THROAT: 0
WHEEZING: 1
DIARRHEA: 0
SINUS PRESSURE: 0
COUGH: 1
BLOOD IN STOOL: 0
EYE DISCHARGE: 0
CONSTIPATION: 0
BACK PAIN: 0
EYE REDNESS: 0
CHEST TIGHTNESS: 0
STRIDOR: 0
TROUBLE SWALLOWING: 0
SHORTNESS OF BREATH: 1

## 2023-09-22 ASSESSMENT — PAIN - FUNCTIONAL ASSESSMENT
PAIN_FUNCTIONAL_ASSESSMENT: NONE - DENIES PAIN
PAIN_FUNCTIONAL_ASSESSMENT: NONE - DENIES PAIN

## 2023-09-22 ASSESSMENT — LIFESTYLE VARIABLES
HOW MANY STANDARD DRINKS CONTAINING ALCOHOL DO YOU HAVE ON A TYPICAL DAY: PATIENT DOES NOT DRINK
HOW OFTEN DO YOU HAVE A DRINK CONTAINING ALCOHOL: NEVER

## 2023-09-22 ASSESSMENT — PAIN SCALES - GENERAL: PAINLEVEL_OUTOF10: 0

## 2023-09-22 NOTE — ED PROVIDER NOTES
Efforts were made to edit the dictations but occasionally words are mis-transcribed.)    Jacqueline Goa MD (electronically signed)  Attending Emergency Physician        Jacqueline Gao MD  09/22/23 3622

## 2023-09-22 NOTE — ED NOTES
Pt is provided with a ham sandwich and beverage by Deniz Yusuf RN.       Sherman Hart RN  09/22/23 2064

## 2023-09-22 NOTE — ED NOTES
Pt is resting upright, in bed, pink, warm and dry. Respirations even/unlabored. Pt's daughter at his bedside.       Khadar Meyer RN  09/22/23 1016

## 2023-09-22 NOTE — ED NOTES
Pt report is given to [facility] [employer]. Pt is transported to room 226, via w/c.       Edilberto Blanco RN  09/22/23 9829

## 2023-09-23 LAB
ALBUMIN SERPL-MCNC: 3.7 G/DL (ref 3.5–4.6)
ALP SERPL-CCNC: 57 U/L (ref 35–104)
ALT SERPL-CCNC: 73 U/L (ref 0–41)
ANION GAP SERPL CALCULATED.3IONS-SCNC: 12 MEQ/L (ref 9–15)
AST SERPL-CCNC: 56 U/L (ref 0–40)
B PARAP IS1001 DNA NPH QL NAA+NON-PROBE: NOT DETECTED
B PERT.PT PRMT NPH QL NAA+NON-PROBE: NOT DETECTED
BACTERIA URNS QL MICRO: NEGATIVE /HPF
BASOPHILS # BLD: 0 K/UL (ref 0–0.1)
BASOPHILS NFR BLD: 0.4 % (ref 0.2–1.2)
BILIRUB DIRECT SERPL-MCNC: <0.2 MG/DL (ref 0–0.4)
BILIRUB INDIRECT SERPL-MCNC: ABNORMAL MG/DL (ref 0–0.6)
BILIRUB SERPL-MCNC: 0.4 MG/DL (ref 0.2–0.7)
BILIRUB UR QL STRIP: NEGATIVE
BUN SERPL-MCNC: 19 MG/DL (ref 8–23)
C PNEUM DNA NPH QL NAA+NON-PROBE: NOT DETECTED
CALCIUM SERPL-MCNC: 8.6 MG/DL (ref 8.5–9.9)
CHLORIDE SERPL-SCNC: 100 MEQ/L (ref 95–107)
CLARITY UR: CLEAR
CO2 SERPL-SCNC: 22 MEQ/L (ref 20–31)
COLOR UR: YELLOW
CREAT SERPL-MCNC: 1.06 MG/DL (ref 0.7–1.2)
EOSINOPHIL # BLD: 0 K/UL (ref 0–0.5)
EOSINOPHIL NFR BLD: 1.1 % (ref 0.8–7)
EPI CELLS #/AREA URNS HPF: NORMAL /HPF
ERYTHROCYTE [DISTWIDTH] IN BLOOD BY AUTOMATED COUNT: 14.5 % (ref 11.6–14.4)
FLUAV RNA NPH QL NAA+NON-PROBE: NOT DETECTED
FLUBV RNA NPH QL NAA+NON-PROBE: NOT DETECTED
GLUCOSE BLD-MCNC: 218 MG/DL (ref 70–99)
GLUCOSE BLD-MCNC: 218 MG/DL (ref 70–99)
GLUCOSE BLD-MCNC: 266 MG/DL (ref 70–99)
GLUCOSE BLD-MCNC: 328 MG/DL (ref 70–99)
GLUCOSE SERPL-MCNC: 234 MG/DL (ref 70–99)
GLUCOSE UR STRIP-MCNC: 100 MG/DL
HADV DNA NPH QL NAA+NON-PROBE: NOT DETECTED
HCOV 229E RNA NPH QL NAA+NON-PROBE: NOT DETECTED
HCOV HKU1 RNA NPH QL NAA+NON-PROBE: NOT DETECTED
HCOV NL63 RNA NPH QL NAA+NON-PROBE: NOT DETECTED
HCOV OC43 RNA NPH QL NAA+NON-PROBE: NOT DETECTED
HCT VFR BLD AUTO: 30.7 % (ref 42–52)
HGB BLD-MCNC: 10.5 G/DL (ref 13.7–17.5)
HGB UR QL STRIP: ABNORMAL
HMPV RNA NPH QL NAA+NON-PROBE: NOT DETECTED
HPIV1 RNA NPH QL NAA+NON-PROBE: NOT DETECTED
HPIV2 RNA NPH QL NAA+NON-PROBE: NOT DETECTED
HPIV3 RNA NPH QL NAA+NON-PROBE: NOT DETECTED
HPIV4 RNA NPH QL NAA+NON-PROBE: NOT DETECTED
IMM GRANULOCYTES # BLD: 0.1 K/UL
IMM GRANULOCYTES NFR BLD: 1.8 %
KETONES UR STRIP-MCNC: NEGATIVE MG/DL
LEUKOCYTE ESTERASE UR QL STRIP: NEGATIVE
LYMPHOCYTES # BLD: 0.6 K/UL (ref 1.3–3.6)
LYMPHOCYTES NFR BLD: 22 %
M PNEUMO DNA NPH QL NAA+NON-PROBE: NOT DETECTED
MCH RBC QN AUTO: 29.3 PG (ref 25.7–32.2)
MCHC RBC AUTO-ENTMCNC: 34.2 % (ref 32.3–36.5)
MCV RBC AUTO: 85.8 FL (ref 79–92.2)
MONOCYTES # BLD: 0.5 K/UL (ref 0.3–0.8)
MONOCYTES NFR BLD: 18.3 % (ref 5.3–12.2)
NEUTROPHILS # BLD: 1.5 K/UL (ref 1.8–5.4)
NEUTS SEG NFR BLD: 56.4 % (ref 34–67.9)
NITRITE UR QL STRIP: NEGATIVE
PERFORMED ON: ABNORMAL
PH UR STRIP: 6 [PH] (ref 5–9)
PHOSPHATE SERPL-MCNC: 2.7 MG/DL (ref 2.3–4.8)
PLATELET # BLD AUTO: 138 K/UL (ref 163–337)
POTASSIUM SERPL-SCNC: 3.8 MEQ/L (ref 3.4–4.9)
PROT SERPL-MCNC: 6.6 G/DL (ref 6.3–8)
PROT UR STRIP-MCNC: 30 MG/DL
RBC # BLD AUTO: 3.58 M/UL (ref 4.63–6.08)
RBC #/AREA URNS HPF: NORMAL /HPF (ref 0–2)
RSV RNA NPH QL NAA+NON-PROBE: NOT DETECTED
RV+EV RNA NPH QL NAA+NON-PROBE: NOT DETECTED
SARS-COV-2 RNA NPH QL NAA+NON-PROBE: NOT DETECTED
SODIUM SERPL-SCNC: 134 MEQ/L (ref 135–144)
SP GR UR STRIP: 1.01 (ref 1–1.03)
UROBILINOGEN UR STRIP-ACNC: 0.2 E.U./DL
WBC # BLD AUTO: 2.7 K/UL (ref 4.2–9)
WBC #/AREA URNS HPF: NORMAL /HPF (ref 0–5)

## 2023-09-23 PROCEDURE — 96365 THER/PROPH/DIAG IV INF INIT: CPT

## 2023-09-23 PROCEDURE — 80074 ACUTE HEPATITIS PANEL: CPT

## 2023-09-23 PROCEDURE — 2580000003 HC RX 258: Performed by: INTERNAL MEDICINE

## 2023-09-23 PROCEDURE — 80048 BASIC METABOLIC PNL TOTAL CA: CPT

## 2023-09-23 PROCEDURE — 0202U NFCT DS 22 TRGT SARS-COV-2: CPT

## 2023-09-23 PROCEDURE — 2500000003 HC RX 250 WO HCPCS: Performed by: INTERNAL MEDICINE

## 2023-09-23 PROCEDURE — 81001 URINALYSIS AUTO W/SCOPE: CPT

## 2023-09-23 PROCEDURE — G0378 HOSPITAL OBSERVATION PER HR: HCPCS

## 2023-09-23 PROCEDURE — 83540 ASSAY OF IRON: CPT

## 2023-09-23 PROCEDURE — 82607 VITAMIN B-12: CPT

## 2023-09-23 PROCEDURE — 36415 COLL VENOUS BLD VENIPUNCTURE: CPT

## 2023-09-23 PROCEDURE — 80076 HEPATIC FUNCTION PANEL: CPT

## 2023-09-23 PROCEDURE — 82746 ASSAY OF FOLIC ACID SERUM: CPT

## 2023-09-23 PROCEDURE — 85025 COMPLETE CBC W/AUTO DIFF WBC: CPT

## 2023-09-23 PROCEDURE — 96366 THER/PROPH/DIAG IV INF ADDON: CPT

## 2023-09-23 PROCEDURE — 83550 IRON BINDING TEST: CPT

## 2023-09-23 PROCEDURE — 96361 HYDRATE IV INFUSION ADD-ON: CPT

## 2023-09-23 PROCEDURE — 82728 ASSAY OF FERRITIN: CPT

## 2023-09-23 PROCEDURE — 6370000000 HC RX 637 (ALT 250 FOR IP): Performed by: INTERNAL MEDICINE

## 2023-09-23 PROCEDURE — 84100 ASSAY OF PHOSPHORUS: CPT

## 2023-09-23 RX ORDER — INSULIN GLARGINE 100 [IU]/ML
14 INJECTION, SOLUTION SUBCUTANEOUS DAILY
Status: DISCONTINUED | OUTPATIENT
Start: 2023-09-23 | End: 2023-09-25 | Stop reason: HOSPADM

## 2023-09-23 RX ORDER — ASPIRIN 325 MG
325 TABLET, DELAYED RELEASE (ENTERIC COATED) ORAL DAILY
Status: DISCONTINUED | OUTPATIENT
Start: 2023-09-23 | End: 2023-09-25 | Stop reason: HOSPADM

## 2023-09-23 RX ORDER — AMLODIPINE BESYLATE 2.5 MG/1
2.5 TABLET ORAL DAILY
Status: DISCONTINUED | OUTPATIENT
Start: 2023-09-23 | End: 2023-09-24

## 2023-09-23 RX ADMIN — INSULIN LISPRO 4 UNITS: 100 INJECTION, SOLUTION INTRAVENOUS; SUBCUTANEOUS at 09:00

## 2023-09-23 RX ADMIN — AMLODIPINE BESYLATE 2.5 MG: 2.5 TABLET ORAL at 20:37

## 2023-09-23 RX ADMIN — DOXYCYCLINE 100 MG: 100 INJECTION, POWDER, LYOPHILIZED, FOR SOLUTION INTRAVENOUS at 23:54

## 2023-09-23 RX ADMIN — ASPIRIN 325 MG: 325 TABLET, COATED ORAL at 11:15

## 2023-09-23 RX ADMIN — DOXYCYCLINE 100 MG: 100 INJECTION, POWDER, LYOPHILIZED, FOR SOLUTION INTRAVENOUS at 11:40

## 2023-09-23 RX ADMIN — Medication 10 ML: at 20:11

## 2023-09-23 RX ADMIN — ACETAMINOPHEN 650 MG: 325 TABLET ORAL at 18:52

## 2023-09-23 RX ADMIN — ACETAMINOPHEN 650 MG: 325 TABLET ORAL at 06:36

## 2023-09-23 RX ADMIN — INSULIN GLARGINE 14 UNITS: 100 INJECTION, SOLUTION SUBCUTANEOUS at 11:15

## 2023-09-23 RX ADMIN — SODIUM CHLORIDE: 9 INJECTION, SOLUTION INTRAVENOUS at 02:43

## 2023-09-23 RX ADMIN — INSULIN LISPRO 6 UNITS: 100 INJECTION, SOLUTION INTRAVENOUS; SUBCUTANEOUS at 17:10

## 2023-09-23 RX ADMIN — ATORVASTATIN CALCIUM 40 MG: 40 TABLET, FILM COATED ORAL at 20:09

## 2023-09-23 RX ADMIN — INSULIN LISPRO 2 UNITS: 100 INJECTION, SOLUTION INTRAVENOUS; SUBCUTANEOUS at 12:33

## 2023-09-23 ASSESSMENT — PAIN SCALES - GENERAL: PAINLEVEL_OUTOF10: 0

## 2023-09-23 NOTE — H&P
Hospital Medicine  History and Physical    Patient:  Ramez Youngblood  MRN: [id number]    CHIEF COMPLAINT:    Chief Complaint   Patient presents with    Dizziness     Near syncope, yesterday. History Obtained From:  Patient, EMR  Primary Care Physician: DARCI Duron    HISTORY OF PRESENT ILLNESS:   The patient is a 77 y.o. male with PMH of peripheral vascular disease, carotid stenosis, hypertension and diabetes. Patient was brought to the emergency room for an evaluation [place] to ER arrival.  Patient was outside on the farm feeling his animal when reportedly had a brief syncopal episode described by his family. No convulsions noted. Family reported the subtle change in mental status. Patient was brought to the emergency room and was found to have DHARMESH. Patient did not want to be admitted but finally he agreed to be admitted for observation. Patient regained his baseline neurological status. No deficit. Low-grade fever. No chills. No hematemesis or melena. No chest pain or palpitation. His troponin is negative.     Past Medical History:      Diagnosis Date    AAA (abdominal aortic aneurysm) ([address])     Abdominal aortic aneurysm (AAA) without rupture ([facility])     Anxiety     CAD (coronary artery disease)     Callus of foot 09/26/2013    Diastasis recti 09/18/2013    DM (diabetes mellitus) ([address])     Gout     Gout     Hearing loss     Hyperlipidemia     Hypertension     Neuropathy     secondary to DM    Neuropathy     Osteoarthritis     PMR (polymyalgia rheumatica) ([address]) 05/19/2014    Polymyalgia rheumatica ([address])        Past Surgical History:      Procedure Laterality Date    BACK SURGERY      CHOLECYSTECTOMY      HAND SURGERY Left 11/18/2015    dupuytren''s release with left thumb    INGUINAL HERNIA REPAIR      right side    VASCULAR SURGERY Left oct 18th 2019    lleft leg triple bypass        Medications Prior to Admission:    Prior to Admission medications

## 2023-09-23 NOTE — PLAN OF CARE
Pt is in bed sleeping. He is A/O/ x4, [place]/ 1 HA, Impulsive. 1 Assist, Contact guard. He takes po medications whole w/ water. Last BM on 09/22/2023, no c/o constipation, abd pain, or diarrhea. He has an IV of NS at 100/ml hr via a 20 ga, Left arm. Area is clean, dry, and intact. No c/o pain at this time. Call light is w/in reach. Pt is resting in bed comfortably. Will continue to monitor.

## 2023-09-23 NOTE — PROGRESS NOTES
(2033) Pt. Temp at 100.9F, 2  mg Tylenols given.  (0636) Pt Temp at 100.5F, 2  mg Tylenols given. Reassessed 1hr later, Pt temp at 100.0F informed incoming RN. Call light is w/ in reach. He is in bed resting comfortably. Will continue to monitor.

## 2023-09-24 ENCOUNTER — APPOINTMENT (OUTPATIENT)
Dept: ULTRASOUND IMAGING | Age: 78
End: 2023-09-24
Payer: MEDICARE

## 2023-09-24 LAB
ALBUMIN SERPL-MCNC: 3.6 G/DL (ref 3.5–4.6)
ALP SERPL-CCNC: 61 U/L (ref 35–104)
ALT SERPL-CCNC: 94 U/L (ref 0–41)
ANION GAP SERPL CALCULATED.3IONS-SCNC: 11 MEQ/L (ref 9–15)
AST SERPL-CCNC: 73 U/L (ref 0–40)
BASOPHILS # BLD: 0 K/UL (ref 0–0.1)
BASOPHILS NFR BLD: 1 % (ref 0.2–1.2)
BILIRUB SERPL-MCNC: 0.4 MG/DL (ref 0.2–0.7)
BUN SERPL-MCNC: 18 MG/DL (ref 8–23)
CALCIUM SERPL-MCNC: 8.9 MG/DL (ref 8.5–9.9)
CHLORIDE SERPL-SCNC: 99 MEQ/L (ref 95–107)
CO2 SERPL-SCNC: 22 MEQ/L (ref 20–31)
CREAT SERPL-MCNC: 1.04 MG/DL (ref 0.7–1.2)
EOSINOPHIL # BLD: 0.1 K/UL (ref 0–0.5)
EOSINOPHIL NFR BLD: 4 % (ref 0.8–7)
ERYTHROCYTE [DISTWIDTH] IN BLOOD BY AUTOMATED COUNT: 14.3 % (ref 11.6–14.4)
FERRITIN: 864 NG/ML (ref 30–400)
FOLATE: >20 NG/ML
GLOBULIN SER CALC-MCNC: 3 G/DL (ref 2.3–3.5)
GLUCOSE BLD-MCNC: 182 MG/DL (ref 70–99)
GLUCOSE BLD-MCNC: 234 MG/DL (ref 70–99)
GLUCOSE BLD-MCNC: 248 MG/DL (ref 70–99)
GLUCOSE BLD-MCNC: 297 MG/DL (ref 70–99)
GLUCOSE SERPL-MCNC: 226 MG/DL (ref 70–99)
HAV IGM SER IA-ACNC: NONREACTIVE
HCT VFR BLD AUTO: 28.6 % (ref 42–52)
HEPATITIS B CORE IGM ANTIBODY: NONREACTIVE
HEPATITIS B SURF AG,XHBAGS: NONREACTIVE
HEPATITIS C ANTIBODY: NONREACTIVE
HGB BLD-MCNC: 9.9 G/DL (ref 13.7–17.5)
IMM GRANULOCYTES # BLD: 0.1 K/UL
IMM GRANULOCYTES NFR BLD: 2.9 %
IRON SATURATION: 10 % (ref 20–55)
IRON: 21 UG/DL (ref 59–158)
LYMPHOCYTES # BLD: 0.9 K/UL (ref 1.3–3.6)
LYMPHOCYTES NFR BLD: 30 %
MCH RBC QN AUTO: 29.2 PG (ref 25.7–32.2)
MCHC RBC AUTO-ENTMCNC: 34.6 % (ref 32.3–36.5)
MCV RBC AUTO: 84.4 FL (ref 79–92.2)
MONOCYTES # BLD: 0.3 K/UL (ref 0.3–0.8)
MONOCYTES NFR BLD: 9 % (ref 5.3–12.2)
NEUTROPHILS # BLD: 1.5 K/UL (ref 1.8–5.4)
NEUTS BAND NFR BLD MANUAL: 32 %
NEUTS SEG NFR BLD: 22 % (ref 34–67.9)
OVALOCYTES BLD QL SMEAR: ABNORMAL
PERFORMED ON: ABNORMAL
PLATELET # BLD AUTO: 165 K/UL (ref 163–337)
POTASSIUM SERPL-SCNC: 3.6 MEQ/L (ref 3.4–4.9)
PROCALCITONIN SERPL IA-MCNC: 0.23 NG/ML (ref 0–0.15)
PROT SERPL-MCNC: 6.6 G/DL (ref 6.3–8)
RBC # BLD AUTO: 3.39 M/UL (ref 4.63–6.08)
SODIUM SERPL-SCNC: 132 MEQ/L (ref 135–144)
TOTAL IRON BINDING CAPACITY: 221 UG/DL (ref 250–450)
TSH SERPL-MCNC: 1.57 UIU/ML (ref 0.44–3.86)
UNSATURATED IRON BINDING CAPACITY: 200 UG/DL (ref 112–347)
VARIANT LYMPHS NFR BLD: 2 %
VITAMIN B-12: 285 PG/ML (ref 232–1245)
WBC # BLD AUTO: 2.8 K/UL (ref 4.2–9)

## 2023-09-24 PROCEDURE — 76700 US EXAM ABDOM COMPLETE: CPT

## 2023-09-24 PROCEDURE — 36415 COLL VENOUS BLD VENIPUNCTURE: CPT

## 2023-09-24 PROCEDURE — 96375 TX/PRO/DX INJ NEW DRUG ADDON: CPT

## 2023-09-24 PROCEDURE — G0378 HOSPITAL OBSERVATION PER HR: HCPCS

## 2023-09-24 PROCEDURE — 84443 ASSAY THYROID STIM HORMONE: CPT

## 2023-09-24 PROCEDURE — 6370000000 HC RX 637 (ALT 250 FOR IP): Performed by: INTERNAL MEDICINE

## 2023-09-24 PROCEDURE — 86592 SYPHILIS TEST NON-TREP QUAL: CPT

## 2023-09-24 PROCEDURE — 6360000002 HC RX W HCPCS: Performed by: INTERNAL MEDICINE

## 2023-09-24 PROCEDURE — 85025 COMPLETE CBC W/AUTO DIFF WBC: CPT

## 2023-09-24 PROCEDURE — 80053 COMPREHEN METABOLIC PANEL: CPT

## 2023-09-24 PROCEDURE — 2580000003 HC RX 258: Performed by: INTERNAL MEDICINE

## 2023-09-24 PROCEDURE — 84145 PROCALCITONIN (PCT): CPT

## 2023-09-24 PROCEDURE — 96372 THER/PROPH/DIAG INJ SC/IM: CPT

## 2023-09-24 PROCEDURE — 2500000003 HC RX 250 WO HCPCS: Performed by: INTERNAL MEDICINE

## 2023-09-24 PROCEDURE — 96366 THER/PROPH/DIAG IV INF ADDON: CPT

## 2023-09-24 RX ORDER — CHOLECALCIFEROL (VITAMIN D3) 125 MCG
500 CAPSULE ORAL DAILY
Status: DISCONTINUED | OUTPATIENT
Start: 2023-09-26 | End: 2023-09-25 | Stop reason: HOSPADM

## 2023-09-24 RX ORDER — CYANOCOBALAMIN 1000 UG/ML
1000 INJECTION, SOLUTION INTRAMUSCULAR; SUBCUTANEOUS DAILY
Status: DISCONTINUED | OUTPATIENT
Start: 2023-09-24 | End: 2023-09-24

## 2023-09-24 RX ORDER — THIAMINE HYDROCHLORIDE 100 MG/ML
100 INJECTION, SOLUTION INTRAMUSCULAR; INTRAVENOUS DAILY
Status: DISCONTINUED | OUTPATIENT
Start: 2023-09-24 | End: 2023-09-25 | Stop reason: HOSPADM

## 2023-09-24 RX ORDER — HEPARIN SODIUM 5000 [USP'U]/ML
5000 INJECTION, SOLUTION INTRAVENOUS; SUBCUTANEOUS 2 TIMES DAILY
Status: DISCONTINUED | OUTPATIENT
Start: 2023-09-24 | End: 2023-09-25 | Stop reason: HOSPADM

## 2023-09-24 RX ORDER — CYANOCOBALAMIN 1000 UG/ML
1000 INJECTION, SOLUTION INTRAMUSCULAR; SUBCUTANEOUS DAILY
Status: COMPLETED | OUTPATIENT
Start: 2023-09-25 | End: 2023-09-25

## 2023-09-24 RX ORDER — POTASSIUM CHLORIDE 20 MEQ/1
20 TABLET, EXTENDED RELEASE ORAL ONCE
Status: COMPLETED | OUTPATIENT
Start: 2023-09-24 | End: 2023-09-24

## 2023-09-24 RX ORDER — LISINOPRIL 5 MG/1
5 TABLET ORAL DAILY
Status: DISCONTINUED | OUTPATIENT
Start: 2023-09-24 | End: 2023-09-25 | Stop reason: HOSPADM

## 2023-09-24 RX ORDER — AMLODIPINE BESYLATE 5 MG/1
5 TABLET ORAL DAILY
Status: DISCONTINUED | OUTPATIENT
Start: 2023-09-24 | End: 2023-09-25 | Stop reason: HOSPADM

## 2023-09-24 RX ORDER — FERROUS SULFATE 325(65) MG
325 TABLET ORAL 2 TIMES DAILY WITH MEALS
Status: DISCONTINUED | OUTPATIENT
Start: 2023-09-24 | End: 2023-09-25 | Stop reason: HOSPADM

## 2023-09-24 RX ADMIN — ATORVASTATIN CALCIUM 40 MG: 40 TABLET, FILM COATED ORAL at 20:47

## 2023-09-24 RX ADMIN — FERROUS SULFATE TAB 325 MG (65 MG ELEMENTAL FE) 325 MG: 325 (65 FE) TAB at 17:20

## 2023-09-24 RX ADMIN — AMLODIPINE BESYLATE 5 MG: 5 TABLET ORAL at 09:02

## 2023-09-24 RX ADMIN — Medication 10 ML: at 11:01

## 2023-09-24 RX ADMIN — METFORMIN HYDROCHLORIDE 500 MG: 500 TABLET, FILM COATED ORAL at 09:25

## 2023-09-24 RX ADMIN — INSULIN LISPRO 2 UNITS: 100 INJECTION, SOLUTION INTRAVENOUS; SUBCUTANEOUS at 12:02

## 2023-09-24 RX ADMIN — DOXYCYCLINE 100 MG: 100 INJECTION, POWDER, LYOPHILIZED, FOR SOLUTION INTRAVENOUS at 11:01

## 2023-09-24 RX ADMIN — FERROUS SULFATE TAB 325 MG (65 MG ELEMENTAL FE) 325 MG: 325 (65 FE) TAB at 10:59

## 2023-09-24 RX ADMIN — INSULIN LISPRO 2 UNITS: 100 INJECTION, SOLUTION INTRAVENOUS; SUBCUTANEOUS at 09:03

## 2023-09-24 RX ADMIN — ASPIRIN 325 MG: 325 TABLET, COATED ORAL at 09:02

## 2023-09-24 RX ADMIN — DOXYCYCLINE 100 MG: 100 INJECTION, POWDER, LYOPHILIZED, FOR SOLUTION INTRAVENOUS at 22:15

## 2023-09-24 RX ADMIN — METFORMIN HYDROCHLORIDE 500 MG: 500 TABLET, FILM COATED ORAL at 17:20

## 2023-09-24 RX ADMIN — Medication 10 ML: at 21:04

## 2023-09-24 RX ADMIN — HEPARIN SODIUM 5000 UNITS: 5000 INJECTION INTRAVENOUS; SUBCUTANEOUS at 11:01

## 2023-09-24 RX ADMIN — POTASSIUM CHLORIDE 20 MEQ: 1500 TABLET, EXTENDED RELEASE ORAL at 10:59

## 2023-09-24 RX ADMIN — LISINOPRIL 5 MG: 5 TABLET ORAL at 17:20

## 2023-09-24 RX ADMIN — CYANOCOBALAMIN 1000 MCG: 1000 INJECTION, SOLUTION INTRAMUSCULAR; SUBCUTANEOUS at 09:25

## 2023-09-24 RX ADMIN — INSULIN GLARGINE 14 UNITS: 100 INJECTION, SOLUTION SUBCUTANEOUS at 09:03

## 2023-09-24 RX ADMIN — THIAMINE HYDROCHLORIDE 100 MG: 100 INJECTION, SOLUTION INTRAMUSCULAR; INTRAVENOUS at 11:01

## 2023-09-24 RX ADMIN — ACETAMINOPHEN 650 MG: 325 TABLET ORAL at 20:47

## 2023-09-24 RX ADMIN — HEPARIN SODIUM 5000 UNITS: 5000 INJECTION INTRAVENOUS; SUBCUTANEOUS at 21:02

## 2023-09-24 NOTE — PROGRESS NOTES
Patient continues to feel great. He continues to request to be discharged home. No further episodes of syncope. Patient has hearing loss but otherwise his cognition is good. Occasional memory lapses. His daughters reported that he is not back to normal 100%. No focal weakness or numbness. Minimal cough. No chest pain or palpitation. No abdominal pain. No nausea or vomiting. ROS: 12 system review otherwise is negative for acute signs or symptoms over the last 24 hrs. Exam: General appearance: alert, appears stated age and cooperative, no apparent distress. Patient is able to walk. Patient is able to converse with exception of hearing difficulties and occasional memory lapses. Skin: Skin color, texture, turgor normal. No rashes or lesions  HEENT: Head: Normocephalic, no lesions, without obvious abnormality. Neck: no adenopathy, no carotid bruit, no JVD, supple, symmetrical, trachea midline, and thyroid not enlarged, symmetric, no tenderness/mass/nodules  Lungs: clear to auscultation bilaterally  Heart: regular rate and rhythm, S1, S2 normal, no murmur, click, rub or gallop  Abdomen: soft, non-tender; bowel sounds normal; no masses,  no organomegaly  Extremities: extremities normal, atraumatic, no cyanosis or edema  Neurologic: Mental status: Alert, oriented, no nuchal rigidity. No slurred speech. Symmetrical motor and tone. Patient is able to walk on his own without any difficulties. Hearing deficit. Occasional memory lapses specifically when it comes to details. Assessment and plan:     *Acute kidney failure, resolved with hydration. Likely caused by hemodynamic shift, probable dehydration. UA is bland. No WBC, RBC and very small amount of protein. *Near syncopal episode and transient altered mentation described by his family. Likely secondary to DHARMESH and volume loss.   Could have been caused by other possible etiologies such as cardiac dysrhythmia, atypical seizure, severe carotid

## 2023-09-24 NOTE — PLAN OF CARE
Pt is in bed w/ family at his bedside. He is A/O/ x4, [place]/ 1 HA, Impulsive. 1 Assist, Contact guard. He takes po medications whole w/ water. Last BM on 09/22/2023, no c/o constipation, abd pain, or diarrhea. Pt HS BS at 218, no SSIC needed at this time. He has a 20 ga IV in his Right arm, area is clean, dry, and intact. No c/o pain at this time. Call light is w/in reach. Pt is resting in bed comfortably. Will continue to monitor.

## 2023-09-25 VITALS
WEIGHT: 158.5 LBS | HEIGHT: 69 IN | RESPIRATION RATE: 18 BRPM | BODY MASS INDEX: 23.47 KG/M2 | SYSTOLIC BLOOD PRESSURE: 122 MMHG | DIASTOLIC BLOOD PRESSURE: 60 MMHG | HEART RATE: 84 BPM | OXYGEN SATURATION: 96 % | TEMPERATURE: 98.2 F

## 2023-09-25 LAB
GLUCOSE BLD-MCNC: 161 MG/DL (ref 70–99)
GLUCOSE BLD-MCNC: 220 MG/DL (ref 70–99)
PERFORMED ON: ABNORMAL
PERFORMED ON: ABNORMAL
RPR SER QL: NORMAL

## 2023-09-25 PROCEDURE — 97116 GAIT TRAINING THERAPY: CPT

## 2023-09-25 PROCEDURE — 97162 PT EVAL MOD COMPLEX 30 MIN: CPT

## 2023-09-25 PROCEDURE — 2580000003 HC RX 258: Performed by: INTERNAL MEDICINE

## 2023-09-25 PROCEDURE — 84425 ASSAY OF VITAMIN B-1: CPT

## 2023-09-25 PROCEDURE — G0378 HOSPITAL OBSERVATION PER HR: HCPCS

## 2023-09-25 PROCEDURE — 2500000003 HC RX 250 WO HCPCS: Performed by: INTERNAL MEDICINE

## 2023-09-25 PROCEDURE — 6360000002 HC RX W HCPCS: Performed by: INTERNAL MEDICINE

## 2023-09-25 PROCEDURE — 96366 THER/PROPH/DIAG IV INF ADDON: CPT

## 2023-09-25 PROCEDURE — 96372 THER/PROPH/DIAG INJ SC/IM: CPT

## 2023-09-25 PROCEDURE — 6370000000 HC RX 637 (ALT 250 FOR IP): Performed by: INTERNAL MEDICINE

## 2023-09-25 PROCEDURE — 96376 TX/PRO/DX INJ SAME DRUG ADON: CPT

## 2023-09-25 RX ORDER — AMLODIPINE BESYLATE 5 MG/1
5 TABLET ORAL DAILY
Qty: 30 TABLET | Refills: 3 | Status: SHIPPED | OUTPATIENT
Start: 2023-09-26

## 2023-09-25 RX ORDER — DOXYCYCLINE HYCLATE 100 MG
100 TABLET ORAL 2 TIMES DAILY
Qty: 10 TABLET | Refills: 0 | Status: SHIPPED | OUTPATIENT
Start: 2023-09-25 | End: 2023-09-30

## 2023-09-25 RX ORDER — LISINOPRIL 5 MG/1
5 TABLET ORAL DAILY
Qty: 30 TABLET | Refills: 3 | Status: SHIPPED | OUTPATIENT
Start: 2023-09-26

## 2023-09-25 RX ORDER — ASPIRIN 325 MG
325 TABLET, DELAYED RELEASE (ENTERIC COATED) ORAL DAILY
Qty: 30 TABLET | Refills: 3 | Status: SHIPPED | OUTPATIENT
Start: 2023-09-26

## 2023-09-25 RX ADMIN — LISINOPRIL 5 MG: 5 TABLET ORAL at 08:29

## 2023-09-25 RX ADMIN — ASPIRIN 325 MG: 325 TABLET, COATED ORAL at 08:29

## 2023-09-25 RX ADMIN — Medication 10 ML: at 08:29

## 2023-09-25 RX ADMIN — FERROUS SULFATE TAB 325 MG (65 MG ELEMENTAL FE) 325 MG: 325 (65 FE) TAB at 08:30

## 2023-09-25 RX ADMIN — AMLODIPINE BESYLATE 5 MG: 5 TABLET ORAL at 08:30

## 2023-09-25 RX ADMIN — HEPARIN SODIUM 5000 UNITS: 5000 INJECTION INTRAVENOUS; SUBCUTANEOUS at 08:24

## 2023-09-25 RX ADMIN — DOXYCYCLINE 100 MG: 100 INJECTION, POWDER, LYOPHILIZED, FOR SOLUTION INTRAVENOUS at 10:35

## 2023-09-25 RX ADMIN — INSULIN LISPRO 2 UNITS: 100 INJECTION, SOLUTION INTRAVENOUS; SUBCUTANEOUS at 08:24

## 2023-09-25 RX ADMIN — INSULIN GLARGINE 14 UNITS: 100 INJECTION, SOLUTION SUBCUTANEOUS at 08:24

## 2023-09-25 RX ADMIN — CYANOCOBALAMIN 1000 MCG: 1000 INJECTION, SOLUTION INTRAMUSCULAR; SUBCUTANEOUS at 08:27

## 2023-09-25 RX ADMIN — THIAMINE HYDROCHLORIDE 100 MG: 100 INJECTION, SOLUTION INTRAMUSCULAR; INTRAVENOUS at 08:27

## 2023-09-25 RX ADMIN — METFORMIN HYDROCHLORIDE 500 MG: 500 TABLET, FILM COATED ORAL at 08:30

## 2023-09-25 NOTE — PROGRESS NOTES
Pt discharged home via daughter. IV removed, catheter intact. Discharge instruction dicussed with daughter and pt no further questions asked. Med sent to pharmacy of pt choice. Consultation appt made for pt.

## 2023-09-25 NOTE — DISCHARGE INSTR - COC
Therapy:  Current Nutrition Therapy:   [address] [place] Diet List:330947542}    Routes of Feeding: {CHP DME Other Feedings:127703092}  Liquids: {Slp liquid thickness:14371}  Daily Fluid Restriction: {CHP DME Yes amt example:969115812}  Last Modified Barium Swallow with Video (Video Swallowing Test): {Done Not Done WYT}    Treatments at the Time of Hospital Discharge:   Respiratory Treatments: ***  Oxygen Therapy:  {Therapy; copd oxygen:62043}  Ventilator:    { CC Vent GHBS:419301634}    Rehab Therapies: {THERAPEUTIC INTERVENTION:2122585160}  Weight Bearing Status/Restrictions: { CC Weight Bearin}  Other Medical Equipment (for information only, NOT a DME order):  {EQUIPMENT:121039385}  Other Treatments: ***    Patient's personal belongings (please select all that are sent with patient):  {P DME Belongings:666249039}    RN SIGNATURE:  {Esignature:898815063}    CASE MANAGEMENT/SOCIAL WORK SECTION    Inpatient Status Date: ***    Readmission Risk Assessment Score:  Readmission Risk              Risk of Unplanned Readmission:  0           Discharging to Facility/ Agency   Name:   Address:  Phone:  Fax:    Dialysis Facility (if applicable)   Name:  Address:  Dialysis Schedule:  Phone:  Fax:    / signature: {Esignature:799633982}    PHYSICIAN SECTION    Prognosis: Good    Condition at Discharge: Stable    Rehab Potential (if transferring to Rehab): Good    Recommended Labs or Other Treatments After Discharge: accu check     Physician Certification: I certify the above information and transfer of Birgit Ramon  is necessary for the continuing treatment of the diagnosis listed and that he requires Home Care for greater 30 days.      Update Admission H&P: No change in H&P    PHYSICIAN SIGNATURE:  Electronically signed by Elton Bell MD on 23 at 11:09 AM EDT

## 2023-09-25 NOTE — PROGRESS NOTES
Physical Therapy evaluation completed this date on paper as system error for contracted Epic Access. Please refer to paper evaluations for results.      Cristina Mcintosh, [address]

## 2023-09-25 NOTE — PROGRESS NOTES
Patient pacing in room, expresses he is ready to leave. Daughter Kurt [place] at bedside, requesting a list of private care givers as requested. Freedom of choice given for [facility] and [facility] was chosen. I called and intiated [facility] with Ingrid Garcia at [facility] and she informed me that they are able to accept patient. I did let Ingrid Garcia know that patient has been discharged.

## 2023-09-25 NOTE — PROGRESS NOTES
Pt consultation appt is scheduled with Dr. Beulah Gagnon, daughter and pt is aware of appt date and time.

## 2023-09-25 NOTE — CONSULTS
nystagmus  drowsiness  tremor  memory difficulty   Positives: none         Electronically signed by Valdemar Arevalo MD on 9/25/2023 at 8:06 AM  .

## 2023-09-26 ENCOUNTER — TELEPHONE (OUTPATIENT)
Dept: INTERNAL MEDICINE | Age: 78
End: 2023-09-26

## 2023-09-26 NOTE — TELEPHONE ENCOUNTER
Yamileth Benítez from [facility] called RE: patient and his medication(s). Patient was discharged on 9/22/23. She stated he is taking AMLODIPINE 5 MG and LISINOPRIL 5 MG. He is also taking the MELOXICAM 7.5. His bp on LT arm was 136/60 and RT arm was 118/54. Her contact number is [phone]. She is requesting clarification, please.     Thank you

## 2023-09-26 NOTE — TELEPHONE ENCOUNTER
Care Transitions Initial Follow Up Call    Outreach made within 2 business days of discharge: Yes    Patient: Santa Lam Patient :    MRN: [id number]  Reason for Admission: There are no discharge diagnoses documented for the most recent discharge. Discharge Date: 23       Spoke with: pt's daughter    Discharge department/facility: [facility]    [facility] Interactive Patient Contact:  Was patient able to fill all prescriptions: No: [facility] is out of some, working on it  Was patient instructed to bring all medications to the follow-up visit: Yes  Is patient taking all medications as directed in the discharge summary? No  Does patient understand their discharge instructions: Yes  Does patient have questions or concerns that need addressed prior to 7-14 day follow up office visit: yes - Nurse is in home now, evaluating.  Pt is a fall risk and struggling with dizziness    Scheduled appointment with PCP within 7-14 days    Follow Up  Future Appointments   Date Time Provider [address]   3/8/2024  1:00 PM Annie Vasquez, [place]

## 2023-09-27 LAB
BACTERIA BLD CULT ORG #2: NORMAL
BACTERIA BLD CULT: NORMAL

## 2023-09-28 ENCOUNTER — TELEPHONE (OUTPATIENT)
Dept: INTERNAL MEDICINE | Age: 78
End: 2023-09-28

## 2023-09-28 PROBLEM — I72.3: Status: ACTIVE | Noted: 2020-04-21

## 2023-09-28 PROBLEM — G30.9 ALZHEIMER'S DEMENTIA WITHOUT BEHAVIORAL DISTURBANCE (MULTI): Status: ACTIVE | Noted: 2020-04-21

## 2023-09-28 PROBLEM — I72.4 POPLITEAL ANEURYSM (CMS-HCC): Status: ACTIVE | Noted: 2020-04-21

## 2023-09-28 PROBLEM — G31.84 MCI (MILD COGNITIVE IMPAIRMENT): Status: ACTIVE | Noted: 2021-02-03

## 2023-09-28 PROBLEM — R42 DIZZINESS: Status: ACTIVE | Noted: 2023-09-28

## 2023-09-28 PROBLEM — R68.89 ABNORMAL ANKLE BRACHIAL INDEX (ABI): Status: ACTIVE | Noted: 2023-09-28

## 2023-09-28 PROBLEM — I63.9 CEREBROVASCULAR ACCIDENT (CVA) (MULTI): Status: ACTIVE | Noted: 2020-12-04

## 2023-09-28 PROBLEM — G45.9 TIA (TRANSIENT ISCHEMIC ATTACK): Status: ACTIVE | Noted: 2023-09-28

## 2023-09-28 PROBLEM — R55 SYNCOPE AND COLLAPSE: Status: ACTIVE | Noted: 2022-05-24

## 2023-09-28 PROBLEM — R26.0 ATAXIC GAIT: Status: ACTIVE | Noted: 2021-02-03

## 2023-09-28 PROBLEM — F02.80 ALZHEIMER'S DEMENTIA WITHOUT BEHAVIORAL DISTURBANCE (MULTI): Status: ACTIVE | Noted: 2020-04-21

## 2023-09-28 PROBLEM — I71.40 ABDOMINAL AORTIC ANEURYSM (AAA) WITHOUT RUPTURE (CMS-HCC): Status: ACTIVE | Noted: 2017-11-28

## 2023-09-28 PROBLEM — N17.9 AKI (ACUTE KIDNEY INJURY) (CMS-HCC): Status: ACTIVE | Noted: 2023-09-22

## 2023-09-28 PROBLEM — I65.23 OCCLUSION AND STENOSIS OF BILATERAL CAROTID ARTERIES: Status: ACTIVE | Noted: 2021-02-03

## 2023-09-28 PROBLEM — I73.9 PAD (PERIPHERAL ARTERY DISEASE) (CMS-HCC): Status: ACTIVE | Noted: 2020-04-21

## 2023-09-28 RX ORDER — CLOPIDOGREL BISULFATE 75 MG/1
1 TABLET ORAL DAILY
COMMUNITY
Start: 2022-06-03

## 2023-09-28 RX ORDER — LISINOPRIL 5 MG/1
1 TABLET ORAL DAILY
COMMUNITY
Start: 2023-09-26

## 2023-09-28 RX ORDER — ATORVASTATIN CALCIUM 40 MG/1
1 TABLET, FILM COATED ORAL DAILY
COMMUNITY
Start: 2022-06-03

## 2023-09-28 RX ORDER — BLOOD SUGAR DIAGNOSTIC
STRIP MISCELLANEOUS 2 TIMES DAILY PRN
COMMUNITY
Start: 2019-10-01

## 2023-09-28 RX ORDER — UBIDECARENONE 75 MG
1 CAPSULE ORAL DAILY
COMMUNITY
Start: 2023-09-26

## 2023-09-28 RX ORDER — METFORMIN HYDROCHLORIDE 1000 MG/1
1 TABLET ORAL 2 TIMES DAILY
COMMUNITY
Start: 2023-05-03

## 2023-09-28 RX ORDER — AMLODIPINE BESYLATE 5 MG/1
1 TABLET ORAL DAILY
COMMUNITY
Start: 2023-09-26

## 2023-09-28 RX ORDER — ASPIRIN 325 MG
1 TABLET, DELAYED RELEASE (ENTERIC COATED) ORAL DAILY
COMMUNITY
Start: 2023-09-26

## 2023-09-28 RX ORDER — FERROUS SULFATE 325(65) MG
1 TABLET, DELAYED RELEASE (ENTERIC COATED) ORAL
COMMUNITY

## 2023-09-28 NOTE — TELEPHONE ENCOUNTER
Jessica Montes from [facility] called RE: patient and his medication(s). Patient was discharged on 9/22/23. She stated he is taking AMLODIPINE 5 MG and LISINOPRIL 5 MG. He is also taking the MELOXICAM 7.5. His bp on LT arm was 136/60 and RT arm was 118/54. Her contact number is [phone]. She is requesting clarification, please. Repeated message.

## 2023-09-29 LAB — VIT B1 SERPL-MCNC: 25 NMOL/L (ref 4–15)

## 2023-09-29 RX ORDER — MELOXICAM 7.5 MG/1
7.5 TABLET ORAL DAILY
COMMUNITY

## 2023-10-05 ENCOUNTER — HOSPITAL ENCOUNTER (OUTPATIENT)
Dept: RADIOLOGY | Facility: HOSPITAL | Age: 78
Discharge: HOME | End: 2023-10-05
Payer: MEDICARE

## 2023-10-05 ENCOUNTER — OFFICE VISIT (OUTPATIENT)
Dept: VASCULAR SURGERY | Facility: CLINIC | Age: 78
End: 2023-10-05
Payer: MEDICARE

## 2023-10-05 VITALS
HEART RATE: 51 BPM | HEIGHT: 70 IN | DIASTOLIC BLOOD PRESSURE: 68 MMHG | SYSTOLIC BLOOD PRESSURE: 130 MMHG | TEMPERATURE: 96.9 F | BODY MASS INDEX: 22.9 KG/M2 | RESPIRATION RATE: 16 BRPM | WEIGHT: 160 LBS

## 2023-10-05 DIAGNOSIS — I65.22 OCCLUSION AND STENOSIS OF LEFT CAROTID ARTERY: ICD-10-CM

## 2023-10-05 DIAGNOSIS — I65.29 STENOSIS OF CAROTID ARTERY, UNSPECIFIED LATERALITY: Primary | ICD-10-CM

## 2023-10-05 DIAGNOSIS — I65.23 OCCLUSION AND STENOSIS OF BILATERAL CAROTID ARTERIES: ICD-10-CM

## 2023-10-05 PROCEDURE — 1159F MED LIST DOCD IN RCRD: CPT | Performed by: SURGERY

## 2023-10-05 PROCEDURE — 99214 OFFICE O/P EST MOD 30 MIN: CPT | Mod: 25 | Performed by: SURGERY

## 2023-10-05 PROCEDURE — 3078F DIAST BP <80 MM HG: CPT | Performed by: SURGERY

## 2023-10-05 PROCEDURE — 1036F TOBACCO NON-USER: CPT | Performed by: SURGERY

## 2023-10-05 PROCEDURE — 93880 EXTRACRANIAL BILAT STUDY: CPT

## 2023-10-05 PROCEDURE — 99204 OFFICE O/P NEW MOD 45 MIN: CPT | Performed by: SURGERY

## 2023-10-05 PROCEDURE — 3075F SYST BP GE 130 - 139MM HG: CPT | Performed by: SURGERY

## 2023-10-05 PROCEDURE — 93880 EXTRACRANIAL BILAT STUDY: CPT | Performed by: RADIOLOGY

## 2023-10-05 NOTE — PROGRESS NOTES
Vascular Surgery Clinic Note    CC: carotid    HPI:  Adam Corley is 78 y.o. male with history of carotid stenosis, htn, hld who presents for dizziness. He has been having dizzy spells for months. He says if he bends over to pick weeds, and stands up he will get dizzy. He does feel the sensation of room spinning. He can turn his head in a certain direction and get dizzy. He has not had recent stroke or TIA. Carotid duplex today shows stable R ICA stenosis about 70%. LICA 50-69% stenosis.    Medical History:   has a past medical history of [place] (hard of hearing).    Meds:   Current Outpatient Medications on File Prior to Visit   Medication Sig Dispense Refill    atorvastatin (Lipitor) 40 mg tablet Take 1 tablet (40 mg) by mouth once daily.      ferrous sulfate 325 (65 Fe) MG EC tablet Take 1 tablet (325 mg) by mouth 2 times a day with meals.      LANCETS MISC 2 times a day.      metFORMIN (Glucophage) 1,000 mg tablet Take 1 tablet (1,000 mg) by mouth 2 times a day.      OneTouch Ultra Test strip 2 times a day as needed (to check blood sugar).      amLODIPine (Norvasc) 5 mg tablet Take 1 tablet (5 mg) by mouth once daily.      aspirin 325 mg EC tablet Take 1 tablet (325 mg) by mouth once daily.      clopidogrel (Plavix) 75 mg tablet Take 1 tablet (75 mg) by mouth once daily.      cyanocobalamin (Vitamin B-12) 500 mcg tablet Take 1 tablet (500 mcg) by mouth once daily.      lisinopril 5 mg tablet Take 1 tablet (5 mg) by mouth once daily.      meloxicam (Mobic) 7.5 mg tablet Take 1 tablet (7.5 mg) by mouth once daily.       No current facility-administered medications on file prior to visit.        Allergies:   No Known Allergies    SH:    Social Determinants of Health     Tobacco Use: Medium Risk (10/5/2023)    Patient History     Smoking Tobacco Use: Former     Smokeless Tobacco Use: Never     Passive Exposure: Not on file   Alcohol Use: Not on file   Financial Resource Strain: Not on file   Food Insecurity: Not on  file   Transportation Needs: Not on file   Physical Activity: Not on file   Stress: Not on file   Social Connections: Not on file   Intimate Partner Violence: Not on file   Depression: Not on file   Housing Stability: Not on file   Utilities: Not on file   Digital Equity: Not on file        FH:  No family history on file.     ROS:  All systems were reviewed and are negative except as per HPI.    Objective:  Vitals:  Vitals:    10/05/23 1210   BP: 130/68   Pulse: 51   Resp: 16   Temp: 36.1 °C (96.9 °F)        Exam:  In NAD, well appearing  Abd Soft, ND/NT  Vascular examination:  Palpable strong radial pulses      Assessment & Plan:  Adam Corley is 78 y.o. male with history of carotid stenosis who is presents with dizzyness. He has not had stroke or TIA. His carotid stenosis is asymptomatic. He is on BMT. I will continue surveillance on a yearly basis. I recommended workup of BPV by neurology or PCP.      I spent a total of 45 minutes on the day of the visit.         Luigi Franklin M.D.

## 2023-10-06 RX ORDER — MECLIZINE HYDROCHLORIDE 25 MG/1
25 TABLET ORAL 3 TIMES DAILY PRN
Qty: 30 TABLET | Refills: 1 | Status: SHIPPED | OUTPATIENT
Start: 2023-10-06 | End: 2023-10-26

## 2023-10-06 NOTE — TELEPHONE ENCOUNTER
Comments:     Last Office Visit (last PCP visit):   9/8/2023    Next Visit Date:  Future Appointments   Date Time Provider [address]   3/8/2024  1:00 PM Jaime Day, [address] [place] [address]       **If hasn't been seen in over a year OR hasn't followed up according to last diabetes/ADHD visit, make appointment for patient before sending refill to provider.     Rx requested:  Requested Prescriptions     Pending Prescriptions Disp Refills    meclizine (ANTIVERT) 25 MG tablet 30 tablet 1     Sig: Take 1 tablet by mouth 3 times daily as needed for Dizziness or Nausea

## 2023-10-10 ENCOUNTER — OFFICE VISIT (OUTPATIENT)
Dept: INTERNAL MEDICINE | Age: 78
End: 2023-10-10
Payer: MEDICARE

## 2023-10-10 VITALS
RESPIRATION RATE: 16 BRPM | TEMPERATURE: 97.3 F | SYSTOLIC BLOOD PRESSURE: 132 MMHG | BODY MASS INDEX: 24.44 KG/M2 | OXYGEN SATURATION: 98 % | WEIGHT: 165 LBS | HEART RATE: 74 BPM | HEIGHT: 69 IN | DIASTOLIC BLOOD PRESSURE: 64 MMHG

## 2023-10-10 DIAGNOSIS — H93.8X3 EAR FULLNESS, BILATERAL: Primary | ICD-10-CM

## 2023-10-10 DIAGNOSIS — H81.313 VERTIGO, AURAL, BILATERAL: ICD-10-CM

## 2023-10-10 DIAGNOSIS — T50.905A MEDICATION SIDE EFFECT, INITIAL ENCOUNTER: ICD-10-CM

## 2023-10-10 PROCEDURE — 1123F ACP DISCUSS/DSCN MKR DOCD: CPT | Performed by: PHYSICIAN ASSISTANT

## 2023-10-10 PROCEDURE — 3075F SYST BP GE 130 - 139MM HG: CPT | Performed by: PHYSICIAN ASSISTANT

## 2023-10-10 PROCEDURE — 3078F DIAST BP <80 MM HG: CPT | Performed by: PHYSICIAN ASSISTANT

## 2023-10-10 PROCEDURE — 99214 OFFICE O/P EST MOD 30 MIN: CPT | Performed by: PHYSICIAN ASSISTANT

## 2023-10-10 ASSESSMENT — ENCOUNTER SYMPTOMS: RESPIRATORY NEGATIVE: 1

## 2023-10-16 NOTE — TELEPHONE ENCOUNTER
Comments:     Last Office Visit (last PCP visit):   10/10/2023    Next Visit Date:  Future Appointments   Date Time Provider [address]   3/8/2024  1:00 PM Jaime Day[address]       **If hasn't been seen in over a year OR hasn't followed up according to last diabetes/ADHD visit, make appointment for patient before sending refill to provider.     Rx requested:  Requested Prescriptions     Pending Prescriptions Disp Refills    metFORMIN (GLUCOPHAGE) 1000 MG tablet [Pharmacy Med Name: metFORMIN HCl 1000 MG Oral Tablet] 180 tablet 0     Sig: Take 1 tablet by mouth twice daily

## 2023-10-31 ENCOUNTER — TRANSCRIBE ORDERS (OUTPATIENT)
Dept: VASCULAR SURGERY | Facility: HOSPITAL | Age: 78
End: 2023-10-31
Payer: MEDICARE

## 2023-10-31 DIAGNOSIS — I65.23 CAROTID STENOSIS, ASYMPTOMATIC, BILATERAL: ICD-10-CM

## 2023-11-07 NOTE — TELEPHONE ENCOUNTER
Comments:     Last Office Visit (last PCP visit):   10/10/2023    Next Visit Date:  Future Appointments   Date Time Provider [address]   3/8/2024  1:00 PM Jaime Day, [address] [place] [address]       **If hasn't been seen in over a year OR hasn't followed up according to last diabetes/ADHD visit, make appointment for patient before sending refill to provider.     Rx requested:  Requested Prescriptions     Pending Prescriptions Disp Refills    meclizine (ANTIVERT) 25 MG tablet [Pharmacy Med Name: Meclizine HCl 25 MG Oral Tablet] 30 tablet 0     Sig: TAKE 1 TABLET BY MOUTH THREE TIMES DAILY AS NEEDED FOR DIZZINESS AND FOR NAUSEA

## 2023-11-10 RX ORDER — MECLIZINE HYDROCHLORIDE 25 MG/1
TABLET ORAL
Qty: 30 TABLET | Refills: 0 | Status: SHIPPED | OUTPATIENT
Start: 2023-11-10

## 2023-11-24 DIAGNOSIS — M25.50 DIFFUSE ARTHRALGIA: ICD-10-CM

## 2023-11-24 NOTE — TELEPHONE ENCOUNTER
Comments:     Last Office Visit (last PCP visit):   10/10/2023    Next Visit Date:  Future Appointments   Date Time Provider [address]   3/8/2024  1:00 PM Jaime Day[address]       **If hasn't been seen in over a year OR hasn't followed up according to last diabetes/ADHD visit, make appointment for patient before sending refill to provider.     Rx requested:  Requested Prescriptions     Pending Prescriptions Disp Refills    meloxicam (MOBIC) 7.5 MG tablet [Pharmacy Med Name: Meloxicam 7.5 MG Oral Tablet] 90 tablet 0     Sig: Take 1 tablet by mouth once daily    atorvastatin (LIPITOR) 40 MG tablet [Pharmacy Med Name: Atorvastatin Calcium 40 MG Oral Tablet] 90 tablet 0     Sig: Take 1 tablet by mouth once daily

## 2023-11-27 RX ORDER — MELOXICAM 7.5 MG/1
TABLET ORAL
Qty: 90 TABLET | Refills: 0 | Status: SHIPPED | OUTPATIENT
Start: 2023-11-27

## 2023-11-27 RX ORDER — ATORVASTATIN CALCIUM 40 MG/1
TABLET, FILM COATED ORAL
Qty: 90 TABLET | Refills: 0 | Status: SHIPPED | OUTPATIENT
Start: 2023-11-27

## 2023-12-11 DIAGNOSIS — H81.313 VERTIGO, AURAL, BILATERAL: Primary | ICD-10-CM

## 2023-12-12 NOTE — TELEPHONE ENCOUNTER
Comments:     Last Office Visit (last PCP visit):   10/10/2023    Next Visit Date:  Future Appointments   Date Time Provider Department Center   3/8/2024  1:00 PM Jennifer Ramirez [facility]       **If hasn't been seen in over a year OR hasn't followed up according to last diabetes/ADHD visit, make appointment for patient before sending refill to provider.    Rx requested:  Requested Prescriptions     Pending Prescriptions Disp Refills    meclizine (ANTIVERT) 25 MG tablet 30 tablet 0

## 2023-12-13 RX ORDER — MECLIZINE HYDROCHLORIDE 25 MG/1
TABLET ORAL
Qty: 90 TABLET | Refills: 1 | Status: SHIPPED | OUTPATIENT
Start: 2023-12-13

## 2024-01-16 NOTE — TELEPHONE ENCOUNTER
Comments:     Last Office Visit (last PCP visit):   10/10/2023    Next Visit Date:  Future Appointments   Date Time Provider Department Center   3/8/2024  1:00 PM Jennifer Ramirez [facility]       **If hasn't been seen in over a year OR hasn't followed up according to last diabetes/ADHD visit, make appointment for patient before sending refill to provider.    Rx requested:  Requested Prescriptions     Pending Prescriptions Disp Refills    metFORMIN (GLUCOPHAGE) 1000 MG tablet 180 tablet 0     Sig: Take 1 tablet by mouth 2 times daily

## 2024-02-21 DIAGNOSIS — M25.50 DIFFUSE ARTHRALGIA: ICD-10-CM

## 2024-02-21 NOTE — TELEPHONE ENCOUNTER
Comments:     Last Office Visit (last PCP visit):   10/10/2023    Next Visit Date:  Future Appointments   Date Time Provider Department Center   3/8/2024  1:00 PM Jennifer Ramirez [facility]       **If hasn't been seen in over a year OR hasn't followed up according to last diabetes/ADHD visit, make appointment for patient before sending refill to provider.    Rx requested:  Requested Prescriptions     Pending Prescriptions Disp Refills    meloxicam (MOBIC) 7.5 MG tablet [Pharmacy Med Name: Meloxicam 7.5 MG Oral Tablet] 90 tablet 0     Sig: Take 1 tablet by mouth once daily    atorvastatin (LIPITOR) 40 MG tablet [Pharmacy Med Name: Atorvastatin Calcium 40 MG Oral Tablet] 90 tablet 0     Sig: Take 1 tablet by mouth once daily

## 2024-02-22 RX ORDER — ATORVASTATIN CALCIUM 40 MG/1
TABLET, FILM COATED ORAL
Qty: 90 TABLET | Refills: 0 | Status: SHIPPED | OUTPATIENT
Start: 2024-02-22

## 2024-02-22 RX ORDER — MELOXICAM 7.5 MG/1
TABLET ORAL
Qty: 90 TABLET | Refills: 0 | Status: SHIPPED | OUTPATIENT
Start: 2024-02-22

## 2024-03-06 NOTE — CONSULTS
"    Service:   Service: Vascular Surgery     Consult:  Consult requested by (Attending Name): Gerald Gonsales   Reason: carotid art stenosis     History of Present Illness:   HPI:    Mr. Corley is a 77 year old male with PMHx of HTN, HLD, and DM2 who was sent to ED from PCP office. Per pt's daughter who is at bedside, she took patient to PCP due  to dizziness, diarrhea, intermittent nausea, and unsteady gait. He was reportedly very lethargic at PCP and not oxygenating well (per daughter as we do not have records to confirm). CBC notable for mild anemia with Hgb 11.7, CMP unremarkable other than  , and high sensitivity Troponin negative x3. CT head showed global volume loss and chronic small vessel ischemic changes; no acute intracranial hemorrhage. He was given 1L bolus, IV Rocephin and Zithromax, and admitted to telemetry for further evaluation  with consult to neurology. Carotid duplex was completed due to pt report of \"blockages in his neck\" which revealed 50-69% stenosis LICA and >/= 70% stenosis GIANCARLO for which vascular surgery has been consulted.    Pt seen and evaluated sitting in chair with daughter at bedside. He is afebrile and hemodynamically stable. He reports dizziness as cause as unsteady gait which he notices if he stands up too quickly or walks a distance (unable to specify how far to produce  dizziness). He denies associated symptoms of headache, unilateral weakness, numbness, and tingling. He does report GOINS but is again unable  to specify; denies associated chest pain and palpitations. His daughter repots pt has had intermittent nausea/vomiting,  decrease appetite, and subjective weight loss over the past month. Has also been having chronic diarrhea. 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 Findings discussed  with Dr. Franklin. Surgical intervention is not indicated at this time. Continue Plavix. " Would start Statin if not contraindicated. Our office has been notified for routine surveillance with repeat Duplex; they will notify patient with date and time. Vascular  surgery will sign off; please call back if needed. Thank you for this consult.     Review Family/Social History and ROS:     Review Family/Social History and ROS:       I have reviewed the family and social history and review of systems from the History and Physical.      Social History:    Smoking Status: former smoker  (1)   Alcohol Use: occasionally (1)   Drug Use: denies  (1)   Drug 2 Use: denies  (1)     Constitutional: POSITIVE: Anorexia, Weight Loss;  NEGATIVE: Fever, Chills, Malaise; COMMENTS: x1 month     Respiratory: NEGATIVE: Dry Cough, Productive Cough,  Hemoptysis, Wheezing, Shortness of Breath     Cardiac: POSITIVE: Dyspnea on Exertion; NEGATIVE:  Chest Pain, Orthopnea, Palpitations, Syncope     Gastrointestinal: POSITIVE: Nausea, Vomiting, Diarrhea ; NEGATIVE: Constipation, Abdominal Pain; COMMENTS: intermittent N/V x1 month. Chronic diarrhea x1 month     Musculoskeletal: NEGATIVE: Decreased ROM, Pain, Swelling,  Stiffness, Weakness     Neurological: POSITIVE: Dizziness; NEGATIVE: Confusion,  Headache, Seizures, Syncope; COMMENTS: with position change and exertion     All Other Systems: All other systems reviewed and  are negative     Objective:     Objective Information:        T   P  R  BP   MAP  SpO2   Value  37.3  66  18  123/58   83  92%  Date/Time 6/2 15:33 6/2 15:33 6/2 15:33 6/2 15:33  6/2 15:33 6/2 15:33  Range  (36.3C - 37.5C )  (66 - 96 )  (16 - 18 )  (112 - 147 )/ (53 - 75 )  (76 - 96 )  (90% - 97% )  Highest temp of 37.5 C was recorded at 6/1 13:46        Pain reported at 6/2 9:29: 0 = None         Weights   6/1 2:59: Weight in kg (Weight (kg))  67  6/1 2:59: Weight in lbs ((lbs))  147.7  6/1 2:59: BMI (kg/m2) (BMI (kg/m2))  21.193      Physical Exam by System:    Constitutional: Well developed, awake/alert/oriented   x3, no distress, alert and cooperative   Eyes: PERRL, EOMI, clear sclera   ENMT: mucous membranes moist, no apparent injury,  no lesions seen   Head/Neck: Neck supple, no apparent injury, No JVD,  trachea midline   Respiratory/Thorax: RR regular and unlabored. Adequate  SpO2 on RA.   Cardiovascular: sinus rhythm   Gastrointestinal: soft, nondistended, nontender.   Genitourinary: deferred; voiding freely   Musculoskeletal: ROM intact, no joint swelling, normal  strength   Extremities: normal extremities, no cyanosis edema,  contusions or wounds, no clubbing   Neurological: alert and oriented x3, intact senses,  motor, response and reflexes, normal strength  CNII-XII grossly intact. Strength equal bilaterally in all  extremities   Breast: deferred   Lymphatic: deferred   Psychological: Appropriate mood and behavior   Skin: Warm and dry, no lesions, no rashes     Refresh Home Medications List:  ·  Select to Refresh Home Medication List Refresh Home Medications      Medications:    Medications:          Continuous Medications       --------------------------------    1. Sodium Chloride 0.9% Infusion:  1000  mL  IntraVenous  <Continuous>    2. Sodium Chloride 0.9% Infusion:  1000  mL  IntraVenous  <Continuous>         Scheduled Medications       --------------------------------    1. Clopidogrel:  75  mg  Oral  Daily    2. Heparin SubCutaneous:  5000  unit(s)  SubCutaneous  Every 8 Hours    3. Insulin Lispro Moderate Corrective Scale:  unit(s)  SubCutaneous  3 Times a Day Before Meals         PRN Medications       --------------------------------    1. Acetaminophen:  650  mg  Oral  Every 4 Hours    2. Dextrose 50% in Water Injectable:  25  gram(s)  IntraVenous Push  Every 15 Minutes    3. Glucagon Injectable:  1  mg  IntraMuscular  Every 15 Minutes    4. Ondansetron Injectable:  4  mg  IntraVenous Push  Every 4 Hours         Conditional Medication Orders       --------------------------------    1. Dextrose 10% in Water  Infusion:  500  mL  IntraVenous  <Continuous>        Recent Lab Results:    Results:        I have reviewed these laboratory results:    Glucose_POCT  Trending View      Result 02-Jun-2022 16:09:00  02-Jun-2022 11:14:00  02-Jun-2022 06:20:00  01-Jun-2022 19:56:00  01-Jun-2022 16:05:00  01-Jun-2022 11:34:00    Glucose-POCT 209   H   149   H   152   H   156   H   157   H   202   H        Basic Metabolic Panel  02-Jun-2022 06:11:00      Result Value    Glucose, Serum  155   H   NA  137    K  3.3   L   CL  102    Bicarbonate, Serum  25    Anion Gap, Serum  13    BUN  11    CREAT  0.94    GFR Male  83    Calcium, Serum  7.9   L     Hemoglobin A1C, Level  02-Jun-2022 06:10:00      Result Value    Estimated Average Glucose  223    Hemoglobin A1C, Level  9.4 Diagnosis of Diabetes-Adults Non-Diabetic: < or = 5.6% Increased risk for developing diabetes: 5.7-6.4% Diagnostic of diabetes: > or = 6.5%.     Monitoring  of Diabetes              Age (y)     Therapeutic Goal (%) Adults:          >1   A     Urinalysis  31-May-2022 23:05:00      Result Value    Color, Urine  NATHANIEL  Reference Range: STRAW,YELLOW    Appearance, Urine  HAZY    Specific Gravity, Urine  1.024    pH, Urine  5.0    Protein, Urine  30 (1+)   A   Glucose, Urine  50 (TRACE)   A   Blood, Urine  NEGATIVE    Ketones, Urine  20 (1+)   A   Bilirubin, Urine  NEGATIVE    Urobilinogen, Urine  <2.0    Nitrite, Urine  NEGATIVE    Leukocyte Esterase, Urine  NEGATIVE      Urinalysis, Microscopic  31-May-2022 23:05:00      Result Value    White Cells  1    Red Blood Cells  1    Epithelial Cells, Squamous  <1    Bacteria, Urine  1+   A   Mucous  4+    Hyaline Casts  4+   A     Troponin I, High Sensitivity  Trending View      Result 31-May-2022 20:48:00  31-May-2022 18:30:00  31-May-2022 15:53:00    Troponin I, High Sensitivity 18   19   18        Influenza A/B,Covid 2019 PCR,Symptomatic  31-May-2022 18:29:00      Result Value    Fluid Source  Nasal, Nasopharyngeal     Influenza A PCR  NOT DETECTED  Reference Range: Not Detected Respiratory virus testing is performed routinely by PCR  for Influenza A/B and RSV.  Not Detected results do not  preclude Influenza A/B or RSV infections since the adequacy of sample collection or lo    Influenza B PCR  NOT DETECTED  Reference Range: Not Detected Respiratory virus testing is performed routinely by PCR  for Influenza A/B and RSV.  Not Detected results do not  preclude Influenza A/B or RSV infections since the adequacy of sample collection or lo    Coronavirus 2019,PCR  NOT DETECTED  Reference Range: Not Detected .This test has received FDA Emergency Use Authorization (EUA) and has been verified by [facility]. This test is only authorized for the duration of time that circumsta      Complete Blood Count + Differential  31-May-2022 15:53:00      Result Value    White Blood Cell Count  6.9    Red Blood Cell Count  4.03   L   HGB  11.7   L   HCT  34.9   L   MCV  87    MCHC  33.5    PLT  301    RDW-CV  14.2    Neutrophil %  57.1    Immature Granulocytes %  3.2   H   Lymphocyte %  17.6    Monocyte %  12.8    Eosinophil %  8.7    Basophil %  0.6    Neutrophil Count  3.92    Lymphocyte Count  1.21    Monocyte Count  0.88   H   Eosinophil Count  0.60   H   Basophil Count  0.04      Comprehensive Metabolic Panel  31-May-2022 15:53:00      Result Value    Glucose, Serum  189   H   NA  133   L   K  3.9    CL  98    Bicarbonate, Serum  24    Anion Gap, Serum  15    BUN  19    CREAT  1.23    GFR Male  60    Calcium, Serum  8.9    ALB  3.6    ALKP  48    T Pro  6.6    T Bili  0.6    Alanine Aminotransferase, Serum  23    Aspartate Transaminase, Serum  18         Radiology Results:    Results:    I have reviewed this radiology result:         Impression:    No acute intracranial infarct, or mass effect.     Mild degree of microangiopathy. Old infarct within the left basal  ganglia.     MRI Brain without Contrast [[place]  2  2022  1:38PM]      Impression:    1. Right internal carotid stenosis of 70% or greater.  2. Left internal carotid stenosis of at least 50-69%.  3. Grayscale and color Doppler narrowing in the mid left common  carotid artery, with focal elevation of peak systolic velocity  suspicious for hemodynamically significant stenosis. This is  incompletely assessed on the current study; a tandem stenosis in the  left common carotid artery could result in underestimation of the  degree of stenosis further distally in the left internal carotid. CT  angiogram of the soft tissues of the neck is recommended to more  thoroughly assess this finding.     The velocity criteria are extrapolated fromdiameter data as defined  by the Society of Radiologists in Ultrasound Consensus Conference  Radiology 2003; 229;340-346.     Ultrasound Carotid Bilateral Duplex [Jun 2 2022  8:51AM]      Impression:    Global volume loss and chronic small vessel ischemic change. No  evidence of acute intracranial hemorrhage.     If persistent concern, MRI can be considered.     CT Head without Contrast [May 31 2022  6:12PM]      Impression:    1.  Mild diffuse interstitial prominence and mild patchy bibasilar  infiltrates and/or atelectasis.  2. Linear lucency overlying left inferolateral chest wall could be  artifactual though component of soft tissue/subcutaneous gas cannot  be entirely excluded. Clinical correlation and follow-up advised.        Xray Chest 1 View [May 31 2022  4:26PM]          Consult Status:  Consult Order ID: [id number]     Problem/Assessment/Plan:    Impression 1: Gait instability; Dizziness; Carotid  Stenosis   Plan for Impression 1: Carotid duplex showed 50-69%  stenosis LICA and >/= 70% stenosis GIANCARLO.  MRI showed No acute intracranial infarct, or mass effect. Mild degree of microangiopathy. 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    -Discussed with Dr. Franklin  -no indication for surgical intervention  -continue Plavix; consider  "Statin if not contraindicated  -neurology following  -our office has been notified to schedule routine surveillance with repeat duplex and follow up with Dr. Franklin in 6 months  -vascular surgery will sign off; thank you for this consult.       Electronic Signatures:  Sandrita Hernandez (APRN-CNP)  (Signed 02-Jun-2022 16:52)   Authored: Service, History of Present Illness, Review  Family/Social History and ROS, Objective, Assessment/Recommendations, Note Completion      Last Updated: 02-Jun-2022 16:52 by Sandrita Hernandez (APRN-CNP)    References:  1.  Data Referenced From \"Patient Profile - Adult v2\" 01-Jun-2022 02:59   "

## 2024-03-06 NOTE — CONSULTS
Service:   Service:  Service: General     Consult:  Consult requested by (Attending Name): Jr Emerson   Reason: ataxia     History of Present Illness:   History Present Illness:  HPI:    murtaza Corley is a 77-year-old young man who was sent from his doctor's office because patient was complaining of unsteadiness with dizziness.  Patient has been  having the symptoms for a while and has been complaining of diarrhea for a few days.  Since coming to the hospital after getting on IV fluids patient is feeling a lot better is able to keep his breakfast and no history of any headache nausea vomiting  but history is very difficult to obtain.    No history of difficulty with speech or swallowing.    Please refer to the initial H&P and the ER records for details.    Patient denies having any recent stroke seizure but he is not sure whether he had a stroke in the past and he does not know the name of all the medication he is on.        Stroke Arrival/Symptom Onset:     Last Known Well - Date/Time: 05-Apr-2022 00:00     Review Family/Social History and ROS:   Constitutional: NEGATIVE: Fever, Chills, Anorexia,  Weight Loss, Malaise     Eyes: NEGATIVE: Blurry Vision, Drainage, Diploplia,  Redness, Vision Loss/ Change     ENMT: NEGATIVE: Nasal Discharge, Nasal Congestion,  Ear Pain, Mouth Pain, Throat Pain     Respiratory: NEGATIVE: Dry Cough, Productive Cough,  Hemoptysis, Wheezing, Shortness of Breath     Cardiac: NEGATIVE: Chest Pain, Dyspnea on Exertion,  Orthopnea, Palpitations, Syncope     Gastrointestinal: POSITIVE: Nausea, Diarrhea; NEGATIVE:  Vomiting, Constipation, Abdominal Pain     Genitourinary: NEGATIVE: Discharge, Dysuria, Flank  Pain, Frequency, Hematuria     Musculoskeletal: POSITIVE: Weakness; NEGATIVE: Decreased  ROM, Pain, Swelling, Stiffness     Neurological: POSITIVE: Dizziness, Confusion; NEGATIVE:  Headache, Seizures, Syncope     Psychiatric: NEGATIVE: Mood Changes, Anxiety, Hallucinations,   Sleep Changes, Suicidal Ideas     Skin: NEGATIVE: Mass, Pain, Pruritus, Rash, Ulcer     Endocrine: NEGATIVE: Heat Intolerance, Cold Intolerance,  Sweat, Polyuria, Thirst     Hematologic/Lymph: NEGATIVE: Anemia, Bruising, Easy  Bleeding, Night Sweats, Petechiae     Allergic/Immunologic: NEGATIVE: Anaphylaxis, Itchy/  Teary Eyes, Itching, Sneezing, Swelling              Allergies:  ·  No Known Allergies :     Objective:     Objective Information:        T   P  R  BP   MAP  SpO2   Value  37.1  92  16  134/64   89  93%  Date/Time 6/1 8:24 6/1 8:24 6/1 8:24 6/1 8:24  6/1 8:24 6/1 8:24  Range  (36.3C - 37.1C )  (68 - 105 )  (16 - 16 )  (134 - 154 )/ (64 - 74 )  (89 - 96 )  (90% - 98% )  Highest temp of 37.1 C was recorded at 6/1 8:24    Physical Exam by System:    Constitutional: Patient is awake alert and oriented  to place person and time.  Memory is impaired.  Not in acute distress   Eyes: PERRL, EOMI, clear sclera   ENMT: mucous membranes moist, no apparent injury,  no lesions seen   Head/Neck: Neck supple, no apparent injury, thyroid  without mass or tenderness, No JVD, trachea midline, no bruits   Respiratory/Thorax: Patent airways, CTAB, normal  breath sounds with good chest expansion, thorax symmetric   Cardiovascular: Regular, rate and rhythm, no murmurs,  2+ equal pulses of the extremities, normal S 1and S 2   Gastrointestinal: Nondistended, soft, non-tender,  no rebound tenderness or guarding, no masses palpable, no organomegaly, +BS, no bruits   Musculoskeletal: ROM intact, no joint swelling, normal  strength   Extremities: normal extremities, no cyanosis edema,  contusions or wounds, no clubbing   Neurological: Patient was sleeping but easily arousable  answering simple questions oriented to place and himself but was confused about the date and the month.  Speech was clear without any paraphasic error.  Memory was impaired short-term as well as long-term.  Attention span judgment concentration was poor.   No  hallucinations or delusions.    Cranial nerve revealed normal extraocular movement and face was symmetrical with tongue in the midline and patient was able to elevate the palate and shoulder without difficulty.  Sensations were normal.    Pupils were equal and reactive.    Motor exam revealed normal decreased tone with generalized wasting and patient was able to move all 4 extremities.    Reflexes were bilaterally hypoactive with plantar equivocal response bilaterally.    Coordination Station and gait with a slightly wide-based ataxic gait and he required assistance for ambulation.    Sensory exam was intact to light touch and pinprick.   Lymphatic: No significant lymphadenopathy   Psychological: Appropriate mood and behavior   Skin: Warm and dry, no lesions, no rashes     Medications:    Medications:          Continuous Medications       --------------------------------    1. Sodium Chloride 0.9% Infusion:  1000  mL  IntraVenous  <Continuous>    2. Sodium Chloride 0.9% Infusion:  1000  mL  IntraVenous  <Continuous>         Scheduled Medications       --------------------------------    1. Heparin SubCutaneous:  5000  unit(s)  SubCutaneous  Every 8 Hours         PRN Medications       --------------------------------    1. Acetaminophen:  650  mg  Oral  Every 4 Hours    2. Ondansetron Injectable:  4  mg  IntraVenous Push  Every 4 Hours        Recent Lab Results:    Results:    CBC: 5/31/2022 15:53              \     Hgb     /                              \     11.7 L    /  WBC  ----------------  Plt               6.9       ----------------    301              /     Hct     \                              /     34.9 L    \            RBC: 4.03 L    MCV: 87     Neutrophil %: 57.1      CMP: 5/31/2022 15:53  NA+        Cl-     BUN  /                         133 L   98    19  /  --------------------------------  Glucose                ---------------------------  189 H    K+     HCO3-   Creat \                          3.9    24    1.23  \           \  T Bili  /                    \  0.6  /  AST  x ---- x ALT        18 x ---- x 23         /  Alk P   \               /  48  \  Calcium : 8.9     Anion Gap : 15     Albumin : 3.6     T Protein : 6.6             I have reviewed these laboratory results:    Complete Blood Count + Differential  31-May-2022 15:53:00      Result Value    White Blood Cell Count  6.9    Red Blood Cell Count  4.03   L   HGB  11.7   L   HCT  34.9   L   MCV  87    MCHC  33.5    PLT  301    RDW-CV  14.2    Neutrophil %  57.1    Immature Granulocytes %  3.2   H   Lymphocyte %  17.6    Monocyte %  12.8    Eosinophil %  8.7    Basophil %  0.6    Neutrophil Count  3.92    Lymphocyte Count  1.21    Monocyte Count  0.88   H   Eosinophil Count  0.60   H   Basophil Count  0.04      Comprehensive Metabolic Panel  31-May-2022 15:53:00      Result Value    Glucose, Serum  189   H   NA  133   L   K  3.9    CL  98    Bicarbonate, Serum  24    Anion Gap, Serum  15    BUN  19    CREAT  1.23    GFR Male  60    Calcium, Serum  8.9    ALB  3.6    ALKP  48    T Pro  6.6    T Bili  0.6    Alanine Aminotransferase, Serum  23    Aspartate Transaminase, Serum  18        Radiology Results:    Results:        Impression:    Global volume loss and chronic small vessel ischemic change. No  evidence of acute intracranial hemorrhage.     If persistent concern, MRI can be considered.     CT Head without Contrast [May 31 2022  6:12PM]      NIH Stroke Scale:     Level of Consciousness: 0 = alert   LOC Question: 0 = both correct   LOC Commands: 0 = obeys both   Best Gaze: 0 = normal   Visual Field: 0 = no visual loss   Facial Paresis: 0 = normal   Left Upper Extremity: 0 = no drift; 10 seconds   Right Upper Extremity: 0 = no drift; 10 seconds   Dysarthria: 0 = normal   Left Lower Extremity: 0 = no drift; 5 seconds   Right Lower Extremity: 0 = no drift; 5 seconds   Best Language: 0 = no aphasia   Limb Ataxia: 1 = present 1 limb   Sensory: 0 =  absent   Neglect: 0 = none   NIHSS Score: 1       Problem List:       Admitting Dx:   Chest pain: Onset Date: 01-Jun-2022       Additional Dx:   Type 2 diabetes mellitus without complication:    Primary hypertension:     Assessment/Recommendations:  Assessment:    Impression.  1.  History of dizziness with unsteady gait most likely brainstem TIA versus CVA.    2.  History of acute encephalopathy most likely due to dehydration need to rule out other organic etiology.    Plan.  Continue with IV hydration and treat underlying medical condition I would like to wait for the MRI of the head and depending on that might make future recommendation.  To have PT OT to eval for gait and safety and have social service look into  disposition.  In the meantime we will try to contact patient's daughter who is the main caregiver.  Signs symptoms of CVA bleeding risk and the precautions and the gait and the safety issues were discussed with patient which patient understood.    Will follow with you.    Due to technical limitations of voice recognition and human error, this note may not accurately reflect the care of the patient.      Consult Status:  Consult Order ID: [id number]       Electronic Signatures:  Edwin Gaitan)  (Signed 01-Jun-2022 11:34)   Authored: Service, History of Present Illness, Stroke  Arrival/Symptom Onset, Review Family/Social History and ROS, Allergies, Objective, NIH Stroke Scale, Assessment/Recommendations, Note Completion      Last Updated: 01-Jun-2022 11:34 by Edwin Gaitan)

## 2024-03-06 NOTE — CONSULTS
"    Service:   Service: Gastroenterology     Consult:  Consult requested by (Attending Name): Edwin Gaitan   Reason: Persistent Diarrhea     History of Present Illness:   Admission Reason: Dizziness   HPI:    Mr. Corley is a 77 year old male with PMHx of HTN, HLD, and DM2 who was sent to ED from PCP office d/t dizziness, nausea, diarrhea, and unsteady gait. According to  the daughter, patient was lethargic at the PCP office and \"not oxygenating well\".  CBC notable for mild anemia with WBC 6.9, Hgb 11.7, And platelet count 301. CMP unremarkable other than , and high sensitivity Troponin negative x3. CT head showed  global volume loss and chronic small vessel ischemic changes; no acute intracranial hemorrhage. Carotid duplex indicated carotid stenosis.  MRI of the brain showed no acute infarct or mass-effect.  Mild degree of microangiopathy.  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  Vascular surgery recommends no surgical intervention at this time and will follow-up with patient outpatient for routine surveillance. GI has been consulted due to complaint of persistent diarrhea.    Pt was seen and evaluated, sitting in chair at bedside. He is afebrile and hemodynamically stable. He had just come back from using the bathroom.  Noted 1 soft dark brown bowel movement, consistency of \"soft serve ice cream\".  Specimen for stool pathogen  PCR and fecal calprotectin sent to lab for analysis. Patient denies nausea or vomiting. Ate 100% breakfast and lunch. Tolerates diet. He reports having had intermittent nausea/vomiting, decrease appetite, and subjective weight loss over the past month  along with chronic diarrhea. Symptoms seemed to have resolved over the last 2 days while in hospital. Patient denies hematemesis, coffee ground emesis, melena or hematochezia. Patient states he has had at least 3 colonoscopies in the past, according to  him all normal. No records available through EMR/HIE. On Plavix for cerebral " vascular disease. No routine NSAID use. Denies smoking cigarettes, marijuana or using elicit drugs. NO alcohol use.       Review Family/Social History and ROS:     Review Family/Social History and ROS:       I have reviewed the family and social history and review of systems from the History and Physical.    Social History:    Smoking Status: former smoker  (1)   Alcohol Use: occasionally (1)   Drug Use: denies  (1)   Drug 2 Use: denies  (1)     Constitutional: POSITIVE: Anorexia, Weight Loss;  NEGATIVE: Fever, Chills, Malaise; COMMENTS: x1 month     Respiratory: NEGATIVE: Dry Cough, Productive Cough,  Hemoptysis, Wheezing, Shortness of Breath     Cardiac: POSITIVE: Dyspnea on Exertion; NEGATIVE:  Chest Pain, Orthopnea, Palpitations, Syncope     Gastrointestinal: POSITIVE: Nausea, Vomiting, Diarrhea ; NEGATIVE: Constipation, Abdominal Pain; COMMENTS: intermittent N/V x1 month. Chronic diarrhea x1 month     Musculoskeletal: NEGATIVE: Decreased ROM, Pain, Swelling,  Stiffness, Weakness     Neurological: POSITIVE: Dizziness; NEGATIVE: Confusion,  Headache, Seizures, Syncope; COMMENTS: with position change and exertion     All Other Systems: All other systems reviewed and  are negative     Objective:     Objective Information:        T   P  R  BP   MAP  SpO2   Value  36.6  72  18  137/64   116  91%  Date/Time 6/3 7:32 6/3 8:27 6/2 15:33 6/3 8:27  6/3 7:32 6/3 7:32  Range  (36.1C - 37.5C )  (63 - 87 )  (18 - 18 )  (112 - 190 )/ (53 - 81 )  (76 - 116 )  (90% - 97% )  Highest temp of 37.5 C was recorded at 6/2 19:29        Pain reported at 6/3 8:27: 0 = None    Physical Exam by System:    Constitutional: Well developed, awake/alert/oriented  x3, no distress, alert and cooperative   Eyes: PERRL, EOMI, clear sclera   ENMT: mucous membranes moist, no apparent injury,  no lesions seen   Head/Neck: Neck supple, no apparent injury, No JVD,  trachea midline   Respiratory/Thorax: RR regular and unlabored. Adequate  SpO2 on RA.    Cardiovascular: sinus rhythm   Gastrointestinal: soft, nondistended, nontender.   Genitourinary: deferred; voiding freely   Musculoskeletal: ROM intact, no joint swelling, normal  strength   Extremities: normal extremities, no cyanosis edema,  contusions or wounds, no clubbing   Neurological: alert and oriented x3, intact senses,  motor, response and reflexes, normal strength  CNII-XII grossly intact. Strength equal bilaterally in all  extremities   Breast: deferred   Lymphatic: deferred   Psychological: Appropriate mood and behavior   Skin: Warm and dry, no lesions, no rashes     Medications:    Medications:          Continuous Medications       --------------------------------    1. Sodium Chloride 0.9% Infusion:  1000  mL  IntraVenous  <Continuous>    2. Sodium Chloride 0.9% Infusion:  1000  mL  IntraVenous  <Continuous>         Scheduled Medications       --------------------------------    1. Clopidogrel:  75  mg  Oral  Daily    2. Heparin SubCutaneous:  5000  unit(s)  SubCutaneous  Every 8 Hours    3. Insulin Lispro Moderate Corrective Scale:  unit(s)  SubCutaneous  3 Times a Day Before Meals         PRN Medications       --------------------------------    1. Acetaminophen:  650  mg  Oral  Every 4 Hours    2. Dextrose 50% in Water Injectable:  25  gram(s)  IntraVenous Push  Every 15 Minutes    3. Glucagon Injectable:  1  mg  IntraMuscular  Every 15 Minutes    4. Ondansetron Injectable:  4  mg  IntraVenous Push  Every 4 Hours         Conditional Medication Orders       --------------------------------    1. Dextrose 10% in Water Infusion:  500  mL  IntraVenous  <Continuous>      Recent Lab Results:    Results:        I have reviewed these laboratory results:    Basic Metabolic Panel  Trending View      Result 03-Jun-2022 06:35:00  02-Jun-2022 06:11:00    Glucose, Serum 153   H   155   H       137    K 3.3   L   3.3   L       102    Bicarbonate, Serum 24   25    Anion Gap, Serum 11   13    BUN 9    11    CREAT 0.96   0.94    GFR Male 81   83    Calcium, Serum 8.0   L   7.9   L        Hemoglobin A1C, Level  02-Jun-2022 06:10:00      Result Value    Estimated Average Glucose  223    Hemoglobin A1C, Level  9.4 Diagnosis of Diabetes-Adults Non-Diabetic: < or = 5.6% Increased risk for developing diabetes: 5.7-6.4% Diagnostic of diabetes: > or = 6.5%.     Monitoring  of Diabetes              Age (y)     Therapeutic Goal (%) Adults:          >1   A     Urinalysis, Microscopic  31-May-2022 23:05:00      Result Value    White Cells  1    Red Blood Cells  1    Epithelial Cells, Squamous  <1    Bacteria, Urine  1+   A   Mucous  4+    Hyaline Casts  4+   A     Troponin I, High Sensitivity  Trending View      Result 31-May-2022 20:48:00  31-May-2022 18:30:00  31-May-2022 15:53:00    Troponin I, High Sensitivity 18   19   18        Influenza A/B,Covid 2019 PCR,Symptomatic  31-May-2022 18:29:00      Result Value    Fluid Source  Nasal, Nasopharyngeal    Influenza A PCR  NOT DETECTED  Reference Range: Not Detected Respiratory virus testing is performed routinely by PCR  for Influenza A/B and RSV.  Not Detected results do not  preclude Influenza A/B or RSV infections since the adequacy of sample collection or lo    Influenza B PCR  NOT DETECTED  Reference Range: Not Detected Respiratory virus testing is performed routinely by PCR  for Influenza A/B and RSV.  Not Detected results do not  preclude Influenza A/B or RSV infections since the adequacy of sample collection or lo    Coronavirus 2019,PCR  NOT DETECTED  Reference Range: Not Detected .This test has received FDA Emergency Use Authorization (EUA) and has been verified by [facility]. This test is only authorized for the duration of time that circumsta      Complete Blood Count + Differential  31-May-2022 15:53:00      Result Value    White Blood Cell Count  6.9    Red Blood Cell Count  4.03   L   HGB  11.7   L   HCT  34.9   L   MCV  87   "  MCHC  33.5    PLT  301    RDW-CV  14.2    Neutrophil %  57.1    Immature Granulocytes %  3.2   H   Lymphocyte %  17.6    Monocyte %  12.8    Eosinophil %  8.7    Basophil %  0.6    Neutrophil Count  3.92    Lymphocyte Count  1.21    Monocyte Count  0.88   H   Eosinophil Count  0.60   H   Basophil Count  0.04      Comprehensive Metabolic Panel  31-May-2022 15:53:00      Result Value    Glucose, Serum  189   H   NA  133   L   K  3.9    CL  98    Bicarbonate, Serum  24    Anion Gap, Serum  15    BUN  19    CREAT  1.23    GFR Male  60    Calcium, Serum  8.9    ALB  3.6    ALKP  48    T Pro  6.6    T Bili  0.6    Alanine Aminotransferase, Serum  23    Aspartate Transaminase, Serum  18          Assessment:    Mr. Corley is a 77 year old male with PMHx of HTN, HLD, and DM2 who was sent to ED from PCP office d/t dizziness, nausea, diarrhea, and unsteady gait. According to  the daughter, patient was lethargic at the PCP office and \"not oxygenating well\".  CBC notable for mild anemia with WBC 6.9, Hgb 11.7, And platelet count 301. CMP unremarkable other than , and high sensitivity Troponin negative x3. CT head showed  global volume loss and chronic small vessel ischemic changes; no acute intracranial hemorrhage. Carotid duplex indicated carotid stenosis.  MRI of the brain showed no acute infarct or mass-effect.  Mild degree of microangiopathy.  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  Vascular surgery recommends no surgical intervention at this time and will follow-up with patient outpatient for routine surveillance. GI has been consulted due to complaint of persistent diarrhea.    Pt was seen and evaluated, sitting in chair at bedside. He is afebrile and hemodynamically stable. He had just come back from using the bathroom.  Noted 1 soft dark brown bowel movement, consistency of \"soft serve ice cream\".  Specimen for stool pathogen  PCR and fecal calprotectin sent to lab for analysis. Patient denies nausea or " "vomiting. Ate 100% breakfast and lunch. Tolerates diet. He reports having had intermittent nausea/vomiting, decrease appetite, and subjective weight loss over the past month  along with chronic diarrhea. Symptoms seemed to have resolved over the last 2 days while in hospital. Patient denies hematemesis, coffee ground emesis, melena or hematochezia. Patient states he has had at least 3 colonoscopies in the past, according to  him all normal. No records available through EMR/HIE. On Plavix for cerebral vascular disease. No routine NSAID use. Denies smoking cigarettes, marijuana or using elicit drugs. NO alcohol use.     Assessment/plan  Nausea, intermittent vomiting - resolved. tolerating diet  Diarrhea, persistent - resolved. soft dark brown BM, consistency of soft serve ice cream. Stool pathogen, calprotectin pending.   Reported weight loss    Patient can follow-up with GI outpatient for further evaluation.  Recommend patient/family bring copies of patient's most recent colonoscopy or patient can follow-up with his gastroenterologist who performed colonoscopy in the past.  Will consider if  repeat colonoscopy is necessary.      Plan of Care Reviewed With:  Plan of Care Reviewed With: patient     Consult Status:  Consult Status    (select all that apply): initial  consult complete, will not follow, call as needed   Consult Order ID: [id number]       Electronic Signatures:  Lizbeth Erwin (APRN-CNP)  (Signed 03-Jun-2022 13:19)   Authored: Service, History of Present Illness, Review  Family/Social History and ROS, Objective, Assessment/Recommendations, Note Completion      Last Updated: 03-Jun-2022 13:19 by Lizbeth Erwin (APRN-CNP)    References:  1.  Data Referenced From \"Consult-Vascular Surgery\" 02-Jun-2022 16:09   "

## 2024-03-08 ENCOUNTER — OFFICE VISIT (OUTPATIENT)
Dept: INTERNAL MEDICINE | Age: 79
End: 2024-03-08
Payer: MEDICARE

## 2024-03-08 VITALS
SYSTOLIC BLOOD PRESSURE: 160 MMHG | HEART RATE: 70 BPM | BODY MASS INDEX: 24.88 KG/M2 | HEIGHT: 69 IN | TEMPERATURE: 97.1 F | WEIGHT: 168 LBS | OXYGEN SATURATION: 95 % | DIASTOLIC BLOOD PRESSURE: 80 MMHG

## 2024-03-08 DIAGNOSIS — E11.9 TYPE 2 DIABETES MELLITUS WITHOUT COMPLICATION, WITHOUT LONG-TERM CURRENT USE OF INSULIN (HCC): Primary | ICD-10-CM

## 2024-03-08 DIAGNOSIS — E78.5 HYPERLIPIDEMIA, UNSPECIFIED HYPERLIPIDEMIA TYPE: ICD-10-CM

## 2024-03-08 DIAGNOSIS — I10 PRIMARY HYPERTENSION: ICD-10-CM

## 2024-03-08 DIAGNOSIS — R42 DIZZINESS: ICD-10-CM

## 2024-03-08 DIAGNOSIS — N17.9 AKI (ACUTE KIDNEY INJURY) (HCC): ICD-10-CM

## 2024-03-08 PROCEDURE — 1123F ACP DISCUSS/DSCN MKR DOCD: CPT | Performed by: PHYSICIAN ASSISTANT

## 2024-03-08 PROCEDURE — 83036 HEMOGLOBIN GLYCOSYLATED A1C: CPT | Performed by: PHYSICIAN ASSISTANT

## 2024-03-08 PROCEDURE — 99214 OFFICE O/P EST MOD 30 MIN: CPT | Performed by: PHYSICIAN ASSISTANT

## 2024-03-08 PROCEDURE — 3077F SYST BP >= 140 MM HG: CPT | Performed by: PHYSICIAN ASSISTANT

## 2024-03-08 PROCEDURE — 3079F DIAST BP 80-89 MM HG: CPT | Performed by: PHYSICIAN ASSISTANT

## 2024-03-08 PROCEDURE — 3046F HEMOGLOBIN A1C LEVEL >9.0%: CPT | Performed by: PHYSICIAN ASSISTANT

## 2024-03-08 RX ORDER — LISINOPRIL 5 MG/1
5 TABLET ORAL DAILY
Qty: 90 TABLET | Refills: 0 | Status: ON HOLD | OUTPATIENT
Start: 2024-03-08

## 2024-03-08 RX ORDER — AMLODIPINE BESYLATE 5 MG/1
5 TABLET ORAL DAILY
Qty: 90 TABLET | Refills: 0 | Status: ON HOLD | OUTPATIENT
Start: 2024-03-08

## 2024-03-08 RX ORDER — MECLIZINE HYDROCHLORIDE 25 MG/1
TABLET ORAL
Qty: 90 TABLET | Refills: 1 | Status: ON HOLD | OUTPATIENT
Start: 2024-03-08

## 2024-03-08 ASSESSMENT — PATIENT HEALTH QUESTIONNAIRE - PHQ9
SUM OF ALL RESPONSES TO PHQ QUESTIONS 1-9: 0
2. FEELING DOWN, DEPRESSED OR HOPELESS: NOT AT ALL
SUM OF ALL RESPONSES TO PHQ QUESTIONS 1-9: 0
SUM OF ALL RESPONSES TO PHQ9 QUESTIONS 1 & 2: 0
1. LITTLE INTEREST OR PLEASURE IN DOING THINGS: NOT AT ALL
SUM OF ALL RESPONSES TO PHQ QUESTIONS 1-9: 0
SUM OF ALL RESPONSES TO PHQ QUESTIONS 1-9: 0

## 2024-03-15 ENCOUNTER — NURSE ONLY (OUTPATIENT)
Dept: INTERNAL MEDICINE | Age: 79
End: 2024-03-15

## 2024-03-15 VITALS — DIASTOLIC BLOOD PRESSURE: 82 MMHG | SYSTOLIC BLOOD PRESSURE: 132 MMHG

## 2024-03-15 DIAGNOSIS — E11.9 TYPE 2 DIABETES MELLITUS WITHOUT COMPLICATION, WITHOUT LONG-TERM CURRENT USE OF INSULIN (HCC): ICD-10-CM

## 2024-03-15 LAB
ALBUMIN SERPL-MCNC: 4.5 G/DL (ref 3.5–4.6)
ALP SERPL-CCNC: 63 U/L (ref 35–104)
ALT SERPL-CCNC: 15 U/L (ref 0–41)
ANION GAP SERPL CALCULATED.3IONS-SCNC: 14 MEQ/L (ref 9–15)
AST SERPL-CCNC: 18 U/L (ref 0–40)
BASOPHILS # BLD: 0 K/UL (ref 0–0.2)
BASOPHILS NFR BLD: 0.3 %
BILIRUB SERPL-MCNC: 0.4 MG/DL (ref 0.2–0.7)
BUN SERPL-MCNC: 34 MG/DL (ref 8–23)
CALCIUM SERPL-MCNC: 9.5 MG/DL (ref 8.5–9.9)
CHLORIDE SERPL-SCNC: 95 MEQ/L (ref 95–107)
CO2 SERPL-SCNC: 24 MEQ/L (ref 20–31)
CREAT SERPL-MCNC: 4.07 MG/DL (ref 0.7–1.2)
EOSINOPHIL # BLD: 0.2 K/UL (ref 0–0.7)
EOSINOPHIL NFR BLD: 2.8 %
ERYTHROCYTE [DISTWIDTH] IN BLOOD BY AUTOMATED COUNT: 13.2 % (ref 11.5–14.5)
GLOBULIN SER CALC-MCNC: 2.8 G/DL (ref 2.3–3.5)
GLUCOSE SERPL-MCNC: 164 MG/DL (ref 70–99)
HCT VFR BLD AUTO: 36.5 % (ref 42–52)
HGB BLD-MCNC: 12 G/DL (ref 14–18)
LYMPHOCYTES # BLD: 1.8 K/UL (ref 1–4.8)
LYMPHOCYTES NFR BLD: 28.3 %
MCH RBC QN AUTO: 29.3 PG (ref 27–31.3)
MCHC RBC AUTO-ENTMCNC: 32.9 % (ref 33–37)
MCV RBC AUTO: 89 FL (ref 79–92.2)
MONOCYTES # BLD: 0.8 K/UL (ref 0.2–0.8)
MONOCYTES NFR BLD: 12.3 %
NEUTROPHILS # BLD: 3.5 K/UL (ref 1.4–6.5)
NEUTS SEG NFR BLD: 55.8 %
PLATELET # BLD AUTO: 209 K/UL (ref 130–400)
POTASSIUM SERPL-SCNC: 4.3 MEQ/L (ref 3.4–4.9)
PROT SERPL-MCNC: 7.3 G/DL (ref 6.3–8)
RBC # BLD AUTO: 4.1 M/UL (ref 4.7–6.1)
SODIUM SERPL-SCNC: 133 MEQ/L (ref 135–144)
WBC # BLD AUTO: 6.4 K/UL (ref 4.8–10.8)

## 2024-03-18 DIAGNOSIS — N17.9 ACUTE RENAL FAILURE, UNSPECIFIED ACUTE RENAL FAILURE TYPE (HCC): Primary | ICD-10-CM

## 2024-03-18 DIAGNOSIS — E11.9 TYPE 2 DIABETES MELLITUS WITHOUT COMPLICATION, WITHOUT LONG-TERM CURRENT USE OF INSULIN (HCC): Primary | ICD-10-CM

## 2024-03-18 LAB — HBA1C MFR BLD: 9.4 % (ref 4.8–5.9)

## 2024-03-20 ENCOUNTER — HOSPITAL ENCOUNTER (INPATIENT)
Age: 79
LOS: 6 days | Discharge: HOME HEALTH | DRG: 683 | End: 2024-03-26
Attending: INTERNAL MEDICINE | Admitting: INTERNAL MEDICINE
Payer: MEDICARE

## 2024-03-20 ENCOUNTER — APPOINTMENT (OUTPATIENT)
Dept: GENERAL RADIOLOGY | Age: 79
DRG: 683 | End: 2024-03-20
Payer: MEDICARE

## 2024-03-20 ENCOUNTER — APPOINTMENT (OUTPATIENT)
Dept: ULTRASOUND IMAGING | Age: 79
DRG: 683 | End: 2024-03-20
Payer: MEDICARE

## 2024-03-20 ENCOUNTER — APPOINTMENT (OUTPATIENT)
Dept: CT IMAGING | Age: 79
DRG: 683 | End: 2024-03-20
Payer: MEDICARE

## 2024-03-20 DIAGNOSIS — I71.40 ABDOMINAL AORTIC ANEURYSM (AAA) WITHOUT RUPTURE, UNSPECIFIED PART (HCC): ICD-10-CM

## 2024-03-20 DIAGNOSIS — N20.0 KIDNEY STONE: Primary | ICD-10-CM

## 2024-03-20 DIAGNOSIS — J11.1 INFLUENZA: ICD-10-CM

## 2024-03-20 DIAGNOSIS — R10.84 GENERALIZED ABDOMINAL PAIN: ICD-10-CM

## 2024-03-20 DIAGNOSIS — B34.8 PARAINFLUENZA: ICD-10-CM

## 2024-03-20 DIAGNOSIS — N17.9 AKI (ACUTE KIDNEY INJURY) (HCC): ICD-10-CM

## 2024-03-20 DIAGNOSIS — I16.0 HYPERTENSIVE URGENCY: ICD-10-CM

## 2024-03-20 LAB
ALBUMIN SERPL-MCNC: 4.4 G/DL (ref 3.5–4.6)
ALP SERPL-CCNC: 62 U/L (ref 35–104)
ALT SERPL-CCNC: 23 U/L (ref 0–41)
ANION GAP SERPL CALCULATED.3IONS-SCNC: 16 MEQ/L (ref 9–15)
AST SERPL-CCNC: 18 U/L (ref 0–40)
B PARAP IS1001 DNA NPH QL NAA+NON-PROBE: NOT DETECTED
B PERT.PT PRMT NPH QL NAA+NON-PROBE: NOT DETECTED
BACTERIA URNS QL MICRO: NEGATIVE /HPF
BASOPHILS # BLD: 0 K/UL (ref 0–0.2)
BASOPHILS NFR BLD: 0.1 %
BILIRUB SERPL-MCNC: <0.2 MG/DL (ref 0.2–0.7)
BILIRUB UR QL STRIP: NEGATIVE
BUN SERPL-MCNC: 50 MG/DL (ref 8–23)
C PNEUM DNA NPH QL NAA+NON-PROBE: NOT DETECTED
CALCIUM SERPL-MCNC: 9.3 MG/DL (ref 8.5–9.9)
CHLORIDE SERPL-SCNC: 97 MEQ/L (ref 95–107)
CLARITY UR: CLEAR
CO2 SERPL-SCNC: 21 MEQ/L (ref 20–31)
COLOR UR: YELLOW
CREAT SERPL-MCNC: 5.87 MG/DL (ref 0.7–1.2)
EOSINOPHIL # BLD: 0.1 K/UL (ref 0–0.7)
EOSINOPHIL NFR BLD: 1.5 %
EPI CELLS #/AREA URNS AUTO: ABNORMAL /HPF (ref 0–5)
ERYTHROCYTE [DISTWIDTH] IN BLOOD BY AUTOMATED COUNT: 13.2 % (ref 11.5–14.5)
FLUAV RNA NPH QL NAA+NON-PROBE: NOT DETECTED
FLUBV RNA NPH QL NAA+NON-PROBE: DETECTED
GLOBULIN SER CALC-MCNC: 3.3 G/DL (ref 2.3–3.5)
GLUCOSE SERPL-MCNC: 216 MG/DL (ref 70–99)
GLUCOSE UR STRIP-MCNC: 250 MG/DL
HADV DNA NPH QL NAA+NON-PROBE: NOT DETECTED
HCOV 229E RNA NPH QL NAA+NON-PROBE: NOT DETECTED
HCOV HKU1 RNA NPH QL NAA+NON-PROBE: NOT DETECTED
HCOV NL63 RNA NPH QL NAA+NON-PROBE: NOT DETECTED
HCOV OC43 RNA NPH QL NAA+NON-PROBE: NOT DETECTED
HCT VFR BLD AUTO: 34.5 % (ref 42–52)
HGB BLD-MCNC: 11.2 G/DL (ref 14–18)
HGB UR QL STRIP: ABNORMAL
HMPV RNA NPH QL NAA+NON-PROBE: NOT DETECTED
HPIV1 RNA NPH QL NAA+NON-PROBE: NOT DETECTED
HPIV2 RNA NPH QL NAA+NON-PROBE: NOT DETECTED
HPIV3 RNA NPH QL NAA+NON-PROBE: DETECTED
HPIV4 RNA NPH QL NAA+NON-PROBE: NOT DETECTED
HYALINE CASTS #/AREA URNS AUTO: ABNORMAL /HPF (ref 0–5)
KETONES UR STRIP-MCNC: NEGATIVE MG/DL
LACTATE BLDV-SCNC: 2.7 MMOL/L (ref 0.5–2.2)
LEUKOCYTE ESTERASE UR QL STRIP: NEGATIVE
LYMPHOCYTES # BLD: 1.1 K/UL (ref 1–4.8)
LYMPHOCYTES NFR BLD: 12.1 %
M PNEUMO DNA NPH QL NAA+NON-PROBE: NOT DETECTED
MAGNESIUM SERPL-MCNC: 1.5 MG/DL (ref 1.7–2.4)
MCH RBC QN AUTO: 29.7 PG (ref 27–31.3)
MCHC RBC AUTO-ENTMCNC: 32.5 % (ref 33–37)
MCV RBC AUTO: 91.5 FL (ref 79–92.2)
MONOCYTES # BLD: 0.7 K/UL (ref 0.2–0.8)
MONOCYTES NFR BLD: 8 %
NEUTROPHILS # BLD: 6.9 K/UL (ref 1.4–6.5)
NEUTS SEG NFR BLD: 77.7 %
NITRITE UR QL STRIP: NEGATIVE
PH UR STRIP: 5.5 [PH] (ref 5–9)
PLATELET # BLD AUTO: 200 K/UL (ref 130–400)
POTASSIUM SERPL-SCNC: 5.4 MEQ/L (ref 3.4–4.9)
PROCALCITONIN SERPL IA-MCNC: 0.11 NG/ML (ref 0–0.15)
PROT SERPL-MCNC: 7.7 G/DL (ref 6.3–8)
PROT UR STRIP-MCNC: 30 MG/DL
RBC # BLD AUTO: 3.77 M/UL (ref 4.7–6.1)
RBC #/AREA URNS AUTO: ABNORMAL /HPF (ref 0–5)
RSV RNA NPH QL NAA+NON-PROBE: NOT DETECTED
RV+EV RNA NPH QL NAA+NON-PROBE: NOT DETECTED
SARS-COV-2 RNA NPH QL NAA+NON-PROBE: NOT DETECTED
SODIUM SERPL-SCNC: 134 MEQ/L (ref 135–144)
SP GR UR STRIP: 1.01 (ref 1–1.03)
TSH SERPL-MCNC: 2.38 UIU/ML (ref 0.44–3.86)
URINE REFLEX TO CULTURE: ABNORMAL
UROBILINOGEN UR STRIP-ACNC: 0.2 E.U./DL
WBC # BLD AUTO: 8.8 K/UL (ref 4.8–10.8)
WBC #/AREA URNS AUTO: ABNORMAL /HPF (ref 0–5)

## 2024-03-20 PROCEDURE — 6360000002 HC RX W HCPCS: Performed by: PHYSICIAN ASSISTANT

## 2024-03-20 PROCEDURE — 2060000000 HC ICU INTERMEDIATE R&B

## 2024-03-20 PROCEDURE — 80053 COMPREHEN METABOLIC PANEL: CPT

## 2024-03-20 PROCEDURE — 83605 ASSAY OF LACTIC ACID: CPT

## 2024-03-20 PROCEDURE — 0202U NFCT DS 22 TRGT SARS-COV-2: CPT

## 2024-03-20 PROCEDURE — 2580000003 HC RX 258: Performed by: PHYSICIAN ASSISTANT

## 2024-03-20 PROCEDURE — P9612 CATHETERIZE FOR URINE SPEC: HCPCS

## 2024-03-20 PROCEDURE — 99285 EMERGENCY DEPT VISIT HI MDM: CPT

## 2024-03-20 PROCEDURE — 96375 TX/PRO/DX INJ NEW DRUG ADDON: CPT

## 2024-03-20 PROCEDURE — 71045 X-RAY EXAM CHEST 1 VIEW: CPT

## 2024-03-20 PROCEDURE — 96376 TX/PRO/DX INJ SAME DRUG ADON: CPT

## 2024-03-20 PROCEDURE — 81001 URINALYSIS AUTO W/SCOPE: CPT

## 2024-03-20 PROCEDURE — 84443 ASSAY THYROID STIM HORMONE: CPT

## 2024-03-20 PROCEDURE — 96374 THER/PROPH/DIAG INJ IV PUSH: CPT

## 2024-03-20 PROCEDURE — 83735 ASSAY OF MAGNESIUM: CPT

## 2024-03-20 PROCEDURE — 36415 COLL VENOUS BLD VENIPUNCTURE: CPT

## 2024-03-20 PROCEDURE — 74176 CT ABD & PELVIS W/O CONTRAST: CPT

## 2024-03-20 PROCEDURE — 87040 BLOOD CULTURE FOR BACTERIA: CPT

## 2024-03-20 PROCEDURE — 84145 PROCALCITONIN (PCT): CPT

## 2024-03-20 PROCEDURE — 93976 VASCULAR STUDY: CPT

## 2024-03-20 PROCEDURE — 85025 COMPLETE CBC W/AUTO DIFF WBC: CPT

## 2024-03-20 PROCEDURE — 70450 CT HEAD/BRAIN W/O DYE: CPT

## 2024-03-20 PROCEDURE — 96361 HYDRATE IV INFUSION ADD-ON: CPT

## 2024-03-20 PROCEDURE — 93005 ELECTROCARDIOGRAM TRACING: CPT | Performed by: INTERNAL MEDICINE

## 2024-03-20 RX ORDER — HYDRALAZINE HYDROCHLORIDE 20 MG/ML
10 INJECTION INTRAMUSCULAR; INTRAVENOUS ONCE
Status: COMPLETED | OUTPATIENT
Start: 2024-03-20 | End: 2024-03-20

## 2024-03-20 RX ORDER — 0.9 % SODIUM CHLORIDE 0.9 %
500 INTRAVENOUS SOLUTION INTRAVENOUS ONCE
Status: COMPLETED | OUTPATIENT
Start: 2024-03-20 | End: 2024-03-20

## 2024-03-20 RX ORDER — LABETALOL HYDROCHLORIDE 5 MG/ML
10 INJECTION, SOLUTION INTRAVENOUS ONCE
Status: COMPLETED | OUTPATIENT
Start: 2024-03-20 | End: 2024-03-20

## 2024-03-20 RX ADMIN — HYDRALAZINE HYDROCHLORIDE 10 MG: 20 INJECTION, SOLUTION INTRAMUSCULAR; INTRAVENOUS at 20:07

## 2024-03-20 RX ADMIN — LABETALOL HYDROCHLORIDE 10 MG: 5 INJECTION, SOLUTION INTRAVENOUS at 22:29

## 2024-03-20 RX ADMIN — SODIUM CHLORIDE 500 ML: 9 INJECTION, SOLUTION INTRAVENOUS at 22:02

## 2024-03-20 RX ADMIN — LABETALOL HYDROCHLORIDE 10 MG: 5 INJECTION, SOLUTION INTRAVENOUS at 20:32

## 2024-03-20 ASSESSMENT — PAIN SCALES - GENERAL: PAINLEVEL_OUTOF10: 5

## 2024-03-20 ASSESSMENT — PAIN DESCRIPTION - FREQUENCY: FREQUENCY: CONTINUOUS

## 2024-03-20 ASSESSMENT — PAIN - FUNCTIONAL ASSESSMENT: PAIN_FUNCTIONAL_ASSESSMENT: 0-10

## 2024-03-20 ASSESSMENT — PAIN DESCRIPTION - PAIN TYPE: TYPE: ACUTE PAIN

## 2024-03-20 NOTE — ED PROVIDER NOTES
[facility]  EMERGENCY DEPARTMENT ENCOUNTER      Pt Name: Bernabe Owen  MRN: [id number]  Birthdate 1945  Date of evaluation: 3/20/2024  Provider: Ryan Gallagher PA-C  7:52 PM EDT    CHIEF COMPLAINT       Chief Complaint   Patient presents with    Hypertension     Per squad report pt has high bp, abd pain, altered mental status         HISTORY OF PRESENT ILLNESS   (Location/Symptom, Timing/Onset, Context/Setting, Quality, Duration, Modifying Factors, Severity)  Note limiting factors.   Bernabe Owen is a 78 y.o. male who presents to the emergency department patient sent from home for altered mental status abdominal pain elevated blood pressure and kidney injury patient states he took his sugar pill this morning he does state he has a headache on the right side comes and goes abdominal pain unknown last bowel or movement or urination.  He has slight cough denies fever chills chest pain.  Symptoms are moderate in severity to severe in severity nothing improves symptoms.  Touch motion abdomen worsens the symptoms    HPI    Nursing Notes were reviewed.    REVIEW OF SYSTEMS    (2-9 systems for level 4, 10 or more for level 5)     Review of Systems   Constitutional:  Negative for fever.   HENT:  Negative for ear discharge, ear pain, nosebleeds and voice change.    Respiratory:  Positive for cough.    Cardiovascular:  Negative for chest pain.   Gastrointestinal:  Positive for abdominal pain and constipation. Negative for abdominal distention, anal bleeding, nausea and vomiting.   Genitourinary:  Positive for difficulty urinating.   Musculoskeletal:  Positive for arthralgias. Negative for joint swelling.   Skin:  Negative for pallor.   Neurological:  Positive for headaches. Negative for seizures and facial asymmetry.   Psychiatric/Behavioral:  Negative for behavioral problems, self-injury and sleep disturbance.    All other systems reviewed and are negative.      Except as noted above the remainder of the

## 2024-03-20 NOTE — ED TRIAGE NOTES
Pt from home to er via squad team, per report pt has abd pain, altered mental status, pt states he is having difficulties with urinating and having bowel movements for few days, per report pt has hx. Of Dementia. Pt a&ox4 on arrival, skin w/d/pink, 0 distress, 0 sob, 0 n&v. C/o mid abd pain. Pt [place], speech clear, 0 vision changes reported, pt calm, cooperative.

## 2024-03-21 ENCOUNTER — APPOINTMENT (OUTPATIENT)
Dept: GENERAL RADIOLOGY | Age: 79
DRG: 683 | End: 2024-03-21
Payer: MEDICARE

## 2024-03-21 PROBLEM — N20.0 KIDNEY STONE: Status: ACTIVE | Noted: 2024-03-21

## 2024-03-21 LAB
ALBUMIN SERPL-MCNC: 3.9 G/DL (ref 3.5–4.6)
ALP SERPL-CCNC: 53 U/L (ref 35–104)
ALT SERPL-CCNC: 18 U/L (ref 0–41)
ANION GAP SERPL CALCULATED.3IONS-SCNC: 16 MEQ/L (ref 9–15)
AST SERPL-CCNC: 15 U/L (ref 0–40)
BACTERIA URNS QL MICRO: NEGATIVE /HPF
BILIRUB SERPL-MCNC: <0.2 MG/DL (ref 0.2–0.7)
BILIRUB UR QL STRIP: NEGATIVE
BUN SERPL-MCNC: 51 MG/DL (ref 8–23)
CALCIUM SERPL-MCNC: 8.4 MG/DL (ref 8.5–9.9)
CHLORIDE SERPL-SCNC: 104 MEQ/L (ref 95–107)
CLARITY UR: CLEAR
CO2 SERPL-SCNC: 18 MEQ/L (ref 20–31)
COLOR UR: YELLOW
CREAT SERPL-MCNC: 5.72 MG/DL (ref 0.7–1.2)
CREAT UR-MCNC: 78.6 MG/DL
EKG ATRIAL RATE: 80 BPM
EKG P AXIS: 51 DEGREES
EKG P-R INTERVAL: 168 MS
EKG Q-T INTERVAL: 350 MS
EKG QRS DURATION: 82 MS
EKG QTC CALCULATION (BAZETT): 403 MS
EKG R AXIS: -17 DEGREES
EKG T AXIS: 8 DEGREES
EKG VENTRICULAR RATE: 80 BPM
EPI CELLS #/AREA URNS AUTO: ABNORMAL /HPF (ref 0–5)
GLOBULIN SER CALC-MCNC: 2.4 G/DL (ref 2.3–3.5)
GLUCOSE BLD-MCNC: 162 MG/DL (ref 70–99)
GLUCOSE BLD-MCNC: 179 MG/DL (ref 70–99)
GLUCOSE BLD-MCNC: 226 MG/DL (ref 70–99)
GLUCOSE BLD-MCNC: 285 MG/DL (ref 70–99)
GLUCOSE SERPL-MCNC: 151 MG/DL (ref 70–99)
GLUCOSE UR STRIP-MCNC: 250 MG/DL
HBA1C MFR BLD: 9.1 % (ref 4.8–5.9)
HGB UR QL STRIP: ABNORMAL
HYALINE CASTS #/AREA URNS AUTO: ABNORMAL /HPF (ref 0–5)
KETONES UR STRIP-MCNC: NEGATIVE MG/DL
LEUKOCYTE ESTERASE UR QL STRIP: NEGATIVE
MICROALBUMIN UR-MCNC: 7.9 MG/DL
MICROALBUMIN/CREAT UR-RTO: 100.5 MG/G (ref 0–30)
NITRITE UR QL STRIP: NEGATIVE
PERFORMED ON: ABNORMAL
PH UR STRIP: 5.5 [PH] (ref 5–9)
POTASSIUM SERPL-SCNC: 4.8 MEQ/L (ref 3.4–4.9)
PROT SERPL-MCNC: 6.3 G/DL (ref 6.3–8)
PROT UR STRIP-MCNC: 30 MG/DL
RBC #/AREA URNS HPF: ABNORMAL /HPF (ref 0–2)
SODIUM SERPL-SCNC: 138 MEQ/L (ref 135–144)
SP GR UR STRIP: 1.01 (ref 1–1.03)
UROBILINOGEN UR STRIP-ACNC: 0.2 E.U./DL
WBC #/AREA URNS AUTO: ABNORMAL /HPF (ref 0–5)

## 2024-03-21 PROCEDURE — 93010 ELECTROCARDIOGRAM REPORT: CPT | Performed by: INTERNAL MEDICINE

## 2024-03-21 PROCEDURE — 6370000000 HC RX 637 (ALT 250 FOR IP): Performed by: INTERNAL MEDICINE

## 2024-03-21 PROCEDURE — 74018 RADEX ABDOMEN 1 VIEW: CPT

## 2024-03-21 PROCEDURE — 83036 HEMOGLOBIN GLYCOSYLATED A1C: CPT

## 2024-03-21 PROCEDURE — 2580000003 HC RX 258

## 2024-03-21 PROCEDURE — 2060000000 HC ICU INTERMEDIATE R&B

## 2024-03-21 PROCEDURE — 82043 UR ALBUMIN QUANTITATIVE: CPT

## 2024-03-21 PROCEDURE — 51798 US URINE CAPACITY MEASURE: CPT

## 2024-03-21 PROCEDURE — 82570 ASSAY OF URINE CREATININE: CPT

## 2024-03-21 PROCEDURE — 36415 COLL VENOUS BLD VENIPUNCTURE: CPT

## 2024-03-21 PROCEDURE — 2580000003 HC RX 258: Performed by: INTERNAL MEDICINE

## 2024-03-21 PROCEDURE — 6370000000 HC RX 637 (ALT 250 FOR IP)

## 2024-03-21 PROCEDURE — 99223 1ST HOSP IP/OBS HIGH 75: CPT | Performed by: UROLOGY

## 2024-03-21 PROCEDURE — 81001 URINALYSIS AUTO W/SCOPE: CPT

## 2024-03-21 PROCEDURE — 80053 COMPREHEN METABOLIC PANEL: CPT

## 2024-03-21 PROCEDURE — 6360000002 HC RX W HCPCS

## 2024-03-21 RX ORDER — ONDANSETRON 2 MG/ML
4 INJECTION INTRAMUSCULAR; INTRAVENOUS EVERY 6 HOURS PRN
Status: DISCONTINUED | OUTPATIENT
Start: 2024-03-21 | End: 2024-03-26 | Stop reason: HOSPADM

## 2024-03-21 RX ORDER — CARVEDILOL 12.5 MG/1
12.5 TABLET ORAL 2 TIMES DAILY WITH MEALS
Status: DISCONTINUED | OUTPATIENT
Start: 2024-03-21 | End: 2024-03-24

## 2024-03-21 RX ORDER — ACETAMINOPHEN 650 MG/1
650 SUPPOSITORY RECTAL EVERY 6 HOURS PRN
Status: DISCONTINUED | OUTPATIENT
Start: 2024-03-21 | End: 2024-03-26 | Stop reason: HOSPADM

## 2024-03-21 RX ORDER — INSULIN LISPRO 100 [IU]/ML
0-8 INJECTION, SOLUTION INTRAVENOUS; SUBCUTANEOUS
Status: DISCONTINUED | OUTPATIENT
Start: 2024-03-21 | End: 2024-03-26 | Stop reason: HOSPADM

## 2024-03-21 RX ORDER — CHOLECALCIFEROL (VITAMIN D3) 125 MCG
500 CAPSULE ORAL DAILY
Status: DISCONTINUED | OUTPATIENT
Start: 2024-03-21 | End: 2024-03-26 | Stop reason: HOSPADM

## 2024-03-21 RX ORDER — SODIUM CHLORIDE 0.9 % (FLUSH) 0.9 %
5-40 SYRINGE (ML) INJECTION EVERY 12 HOURS SCHEDULED
Status: DISCONTINUED | OUTPATIENT
Start: 2024-03-21 | End: 2024-03-26 | Stop reason: HOSPADM

## 2024-03-21 RX ORDER — HEPARIN SODIUM 5000 [USP'U]/ML
5000 INJECTION, SOLUTION INTRAVENOUS; SUBCUTANEOUS EVERY 8 HOURS SCHEDULED
Status: DISCONTINUED | OUTPATIENT
Start: 2024-03-21 | End: 2024-03-26 | Stop reason: HOSPADM

## 2024-03-21 RX ORDER — ONDANSETRON 4 MG/1
4 TABLET, ORALLY DISINTEGRATING ORAL EVERY 8 HOURS PRN
Status: DISCONTINUED | OUTPATIENT
Start: 2024-03-21 | End: 2024-03-26 | Stop reason: HOSPADM

## 2024-03-21 RX ORDER — SODIUM CHLORIDE 9 MG/ML
INJECTION, SOLUTION INTRAVENOUS PRN
Status: DISCONTINUED | OUTPATIENT
Start: 2024-03-21 | End: 2024-03-26 | Stop reason: HOSPADM

## 2024-03-21 RX ORDER — SODIUM CHLORIDE 9 MG/ML
INJECTION, SOLUTION INTRAVENOUS CONTINUOUS
Status: DISPENSED | OUTPATIENT
Start: 2024-03-21 | End: 2024-03-23

## 2024-03-21 RX ORDER — ACETAMINOPHEN 325 MG/1
650 TABLET ORAL EVERY 6 HOURS PRN
Status: DISCONTINUED | OUTPATIENT
Start: 2024-03-21 | End: 2024-03-26 | Stop reason: HOSPADM

## 2024-03-21 RX ORDER — TAMSULOSIN HYDROCHLORIDE 0.4 MG/1
0.4 CAPSULE ORAL DAILY
Status: DISCONTINUED | OUTPATIENT
Start: 2024-03-21 | End: 2024-03-26 | Stop reason: HOSPADM

## 2024-03-21 RX ORDER — LABETALOL HYDROCHLORIDE 5 MG/ML
10 INJECTION, SOLUTION INTRAVENOUS EVERY 6 HOURS PRN
Status: DISCONTINUED | OUTPATIENT
Start: 2024-03-21 | End: 2024-03-25

## 2024-03-21 RX ORDER — INSULIN LISPRO 100 [IU]/ML
0-4 INJECTION, SOLUTION INTRAVENOUS; SUBCUTANEOUS NIGHTLY
Status: DISCONTINUED | OUTPATIENT
Start: 2024-03-21 | End: 2024-03-26 | Stop reason: HOSPADM

## 2024-03-21 RX ORDER — SODIUM CHLORIDE 0.9 % (FLUSH) 0.9 %
5-40 SYRINGE (ML) INJECTION PRN
Status: DISCONTINUED | OUTPATIENT
Start: 2024-03-21 | End: 2024-03-26 | Stop reason: HOSPADM

## 2024-03-21 RX ORDER — AMLODIPINE BESYLATE 5 MG/1
5 TABLET ORAL DAILY
Status: DISCONTINUED | OUTPATIENT
Start: 2024-03-21 | End: 2024-03-26 | Stop reason: HOSPADM

## 2024-03-21 RX ORDER — GLUCAGON 1 MG/ML
1 KIT INJECTION PRN
Status: DISCONTINUED | OUTPATIENT
Start: 2024-03-21 | End: 2024-03-26 | Stop reason: HOSPADM

## 2024-03-21 RX ORDER — ATORVASTATIN CALCIUM 40 MG/1
40 TABLET, FILM COATED ORAL DAILY
Status: DISCONTINUED | OUTPATIENT
Start: 2024-03-21 | End: 2024-03-26 | Stop reason: HOSPADM

## 2024-03-21 RX ORDER — DEXTROSE MONOHYDRATE 100 MG/ML
INJECTION, SOLUTION INTRAVENOUS CONTINUOUS PRN
Status: DISCONTINUED | OUTPATIENT
Start: 2024-03-21 | End: 2024-03-26 | Stop reason: HOSPADM

## 2024-03-21 RX ORDER — HYDRALAZINE HYDROCHLORIDE 20 MG/ML
10 INJECTION INTRAMUSCULAR; INTRAVENOUS EVERY 6 HOURS PRN
Status: DISCONTINUED | OUTPATIENT
Start: 2024-03-21 | End: 2024-03-26 | Stop reason: HOSPADM

## 2024-03-21 RX ORDER — ASPIRIN 325 MG
325 TABLET, DELAYED RELEASE (ENTERIC COATED) ORAL DAILY
Status: DISCONTINUED | OUTPATIENT
Start: 2024-03-21 | End: 2024-03-26 | Stop reason: HOSPADM

## 2024-03-21 RX ORDER — AMLODIPINE BESYLATE 5 MG/1
5 TABLET ORAL DAILY
Status: DISCONTINUED | OUTPATIENT
Start: 2024-03-21 | End: 2024-03-21

## 2024-03-21 RX ADMIN — CYANOCOBALAMIN TAB 500 MCG 500 MCG: 500 TAB at 10:25

## 2024-03-21 RX ADMIN — CARVEDILOL 12.5 MG: 12.5 TABLET, FILM COATED ORAL at 18:17

## 2024-03-21 RX ADMIN — HEPARIN SODIUM 5000 UNITS: 5000 INJECTION INTRAVENOUS; SUBCUTANEOUS at 22:15

## 2024-03-21 RX ADMIN — TAMSULOSIN HYDROCHLORIDE 0.4 MG: 0.4 CAPSULE ORAL at 10:25

## 2024-03-21 RX ADMIN — SODIUM CHLORIDE: 9 INJECTION, SOLUTION INTRAVENOUS at 15:56

## 2024-03-21 RX ADMIN — AMLODIPINE BESYLATE 5 MG: 5 TABLET ORAL at 10:26

## 2024-03-21 RX ADMIN — Medication 10 ML: at 22:14

## 2024-03-21 RX ADMIN — HYDRALAZINE HYDROCHLORIDE 10 MG: 20 INJECTION INTRAMUSCULAR; INTRAVENOUS at 10:26

## 2024-03-21 RX ADMIN — HYDRALAZINE HYDROCHLORIDE 10 MG: 20 INJECTION INTRAMUSCULAR; INTRAVENOUS at 00:40

## 2024-03-21 RX ADMIN — CARVEDILOL 12.5 MG: 12.5 TABLET, FILM COATED ORAL at 10:39

## 2024-03-21 RX ADMIN — HEPARIN SODIUM 5000 UNITS: 5000 INJECTION INTRAVENOUS; SUBCUTANEOUS at 15:53

## 2024-03-21 RX ADMIN — ACETAMINOPHEN 325MG 650 MG: 325 TABLET ORAL at 22:17

## 2024-03-21 RX ADMIN — SODIUM CHLORIDE: 9 INJECTION, SOLUTION INTRAVENOUS at 00:30

## 2024-03-21 RX ADMIN — ATORVASTATIN CALCIUM 40 MG: 40 TABLET, FILM COATED ORAL at 22:15

## 2024-03-21 RX ADMIN — INSULIN LISPRO 4 UNITS: 100 INJECTION, SOLUTION INTRAVENOUS; SUBCUTANEOUS at 12:30

## 2024-03-21 RX ADMIN — HEPARIN SODIUM 5000 UNITS: 5000 INJECTION INTRAVENOUS; SUBCUTANEOUS at 05:57

## 2024-03-21 RX ADMIN — ASPIRIN 325 MG: 325 TABLET, COATED ORAL at 10:26

## 2024-03-21 ASSESSMENT — PAIN DESCRIPTION - LOCATION: LOCATION: HEAD

## 2024-03-21 ASSESSMENT — PAIN SCALES - GENERAL: PAINLEVEL_OUTOF10: 9

## 2024-03-21 NOTE — FLOWSHEET NOTE
0036: patient given hydralazine 10 IV for BP of 206/76. Physician notified and wants BP rechecked in 30 minutes. Will continue to monitor the patient

## 2024-03-21 NOTE — CONSULTS
Renal consult    ACute Renal failure   obstructive -  nature suspected  possible use NSAID cannot confirm with daughter  no answer on phone  Hypertension   Severe dementia  DM type-2  OHDx AAA                                                         Plan await bladder scan  mild right hydro calculi  Needs BP controlled avoid nephrotoxic exposure   urine albumin      1 PM  talked with daughter just now patient with dementia  large loose stools for 4 days with vomiting  slowed down Monday  ak=lso has been using mobic daily for weks

## 2024-03-21 NOTE — ED NOTES
Ethan lundberg aware of pt's bp at this time, 0 new orders, pt to US via cart.  A&ox4, skin w/d/pink, 0 distress, 0 pain.

## 2024-03-21 NOTE — H&P
spent additional time explaining care, normal/abnormal findings and treatment plan, reviewing patient's chart and adjusting/ reconciling medications. All of pt questions were answered. Counseling, diet and education were  provided. Case will be discussed with nursing staff when appropriate. Family will be updated if and when appropriate.    SIGNATURE: STEPHANIA Angela - CNP  DATE: March 20, 2024  TIME: 11:08 PM     Evon Hernandez MD - supervising physician

## 2024-03-21 NOTE — CONSULTS
[facility]                   [address] [place] [address]                              CONSULTATION      PATIENT NAME: LUIS ARMANDO PINEDA               : 1945  MED REC NO: [id number]                        ROOM: W195  ACCOUNT NO: [id number]                       ADMIT DATE: 2024  PROVIDER: Phillip Salazar MD    INPATIENT CONSULTATION    CONSULT DATE:  2024    REFERRING PHYSICIAN:  Dr. Antonio      REASON FOR CONSULTATION:  Right distal ureteral stone.    HISTORY OF PRESENT ILLNESS:  This is a 78-year-old male with history of coronary artery disease, abdominal aortic aneurysm, hypertension, hyperlipidemia, type 2 diabetes, dementia, prior history of stones, who came to the emergency room yesterday with a chief complaint of increased shortness of breath on exertion, nausea, vomiting, and abdominal pain.  He had bilateral flank pain for several days.  On initial evaluation, he was significantly hypertensive, had an elevated creatinine of 5.87 and a CT scan showing a 3 mm right UVJ stone.  He was admitted and urologic consultation was requested.  The patient reports that this morning his pain has resolved.  He has no nausea or vomiting.  No fevers, no chills.  He is voiding without difficulty.  He has a significant volume of clear yellow urine in his urinal.  He has not seen a stone pass as of yet.  He has no other current urologic complaints including no dysuria, no hematuria.  He does report that many years ago he passed a small stone after several days of pain while at home.  He has no prior urologic surgical history reported.    PAST MEDICAL HISTORY:  Includes diabetes, abdominal aortic aneurysm, anxiety, coronary artery disease, gout, neuropathy, hypertension, hyperlipidemia, arthritis, polymyalgia rheumatica.    PAST SURGICAL HISTORY:  He has had back surgery, cholecystectomy, hand surgery, right inguinal hernia repair, and a left leg vascular bypass

## 2024-03-21 NOTE — PLAN OF CARE
Problem: Discharge Planning  Goal: Discharge to home or other facility with appropriate resources  Outcome: Progressing  Flowsheets (Taken 3/21/2024 0013 by Thee De Guzman RN)  Discharge to home or other facility with appropriate resources: Identify barriers to discharge with patient and caregiver     Problem: Pain  Goal: Verbalizes/displays adequate comfort level or baseline comfort level  Outcome: Progressing     Problem: Skin/Tissue Integrity  Goal: Absence of new skin breakdown  Description: 1.  Monitor for areas of redness and/or skin breakdown  2.  Assess vascular access sites hourly  3.  Every 4-6 hours minimum:  Change oxygen saturation probe site  4.  Every 4-6 hours:  If on nasal continuous positive airway pressure, respiratory therapy assess nares and determine need for appliance change or resting period.  Outcome: Progressing     Problem: Safety - Adult  Goal: Free from fall injury  Outcome: Progressing     Problem: Chronic Conditions and Co-morbidities  Goal: Patient's chronic conditions and co-morbidity symptoms are monitored and maintained or improved  Outcome: Progressing

## 2024-03-21 NOTE — CONSULTS
[facility]                   [address] [place] [address]                              CONSULTATION      PATIENT NAME: LUIS ARMANDO PINEDA               : 1945  MED REC NO: [id number]                        ROOM: W195  ACCOUNT NO: [id number]                       ADMIT DATE: 2024  PROVIDER: Lorenzo Gilbert DO    RENAL CONSULT      HISTORY OF PRESENT ILLNESS:  78-year-old who has significant dementia and cannot get a history pertinent from him.  I tried to call the daughter and left a message on her answering machine.  He has a known history of coronary artery disease per chart in addition to abdominal aortic aneurysm, hypertension, hyperlipidemia, diabetes, and renal calculi.  The patient appears to be in no distress and very pleasant with dementia, talking about things in the past not pertinent to his admission.  At the time of his admission to the hospital, the patient did have a creatinine of 5.87, GFR of 4; slight hyperkalemia at 5.4, it is corrected to 4.8.  Six months ago, the patient had normal renal function.  A CT scan showed mild hydronephrosis on the right from a 3 mm renal stone.  There is no history of nausea, vomiting, or diarrhea.  No illegitimate use of medications, i.e., nonsteroidals. [place] per daughter diarrhea N&V  for 4 days  some relief 3 days ago    PAST MEDICAL HISTORY:  Coronary artery disease, abdominal aortic aneurysm, history of gout, diabetes mellitus type 2, hypertension, hyperlipidemia, neuropathy in lower limbs, polymyalgia rheumatica.    PAST SURGICAL HISTORY:  Laminectomy, cholecystectomy, left hand Dupuytren surgery, inguinal herniorrhaphy surgery, left leg bypass surgery.    SOCIAL HISTORY:  .  Former smoker, stopped in .  No alcohol use.    ALLERGIES:  TO MEDICATIONS, NONE.      MEDICATIONS:  At the time of his admission, Antivert, Norvasc, Mobic, Zestril, Lipitor, Glucophage, aspirin, iron.    REVIEW OF

## 2024-03-21 NOTE — CARE COORDINATION
Case Management Assessment  Initial Evaluation    Date/Time of Evaluation: 3/21/2024 2:34 PM  Assessment Completed by: Brittani Chapman    If patient is discharged prior to next notation, then this note serves as note for discharge by case management.    Patient Name: Bernabe Owen                   YOB: 1945  Diagnosis: Kidney stone [N20.0]  Parainfluenza [B34.8]  Influenza [J11.1]  Generalized abdominal pain [R10.84]  DHARMESH (acute kidney injury) (HCC) [N17.9]  Hypertensive urgency [I16.0]  Abdominal aortic aneurysm (AAA) without rupture, unspecified part (HCC) [I71.40]                   Date / Time: 3/20/2024  7:48 PM    Patient Admission Status: Inpatient   Readmission Risk (Low < 19, Mod (19-27), High > 27): Readmission Risk Score: 15.4    Current PCP: Jennifer Ramirez PA  PCP verified by CM? Yes    Chart Reviewed: Yes      History Provided by: Child/Family  Patient Orientation: Alert and Oriented, Person, Place, Situation, Self    Patient Cognition: Alert    Hospitalization in the last 30 days (Readmission):  No    If yes, Readmission Assessment in CM Navigator will be completed.    Advance Directives:      Code Status: Full Code   Patient's Primary Decision Maker is: Legal Next of Kin    Primary Decision Maker: Denise Green - Child - [phone]    Discharge Planning:    Patient lives with: Children Type of Home: House  Primary Care Giver: Self  Patient Support Systems include: Children (Simultaneous filing. User may not have seen previous data.)   Current Financial resources:    Current community resources:    Current services prior to admission: None            Current DME:              Type of Home Care services:  None    ADLS  Prior functional level: Independent in ADLs/IADLs  Current functional level: Independent in ADLs/IADLs    PT AM-PAC:   /24  OT AM-PAC:   /24    Family can provide assistance at DC: Yes  Would you like Case Management to discuss the discharge plan with any other

## 2024-03-21 NOTE — ACP (ADVANCE CARE PLANNING)
Advance Care Planning   Healthcare Decision Maker:    Primary Decision Maker: Denise Green - [place] - [phone]    Click here to complete Healthcare Decision Makers including selection of the Healthcare Decision Maker Relationship (ie \"Primary\").  Today we documented Decision Maker(s) consistent with Legal Next of Kin hierarchy.

## 2024-03-22 LAB
ALBUMIN SERPL-MCNC: 3.6 G/DL (ref 3.5–4.6)
ALP SERPL-CCNC: 47 U/L (ref 35–104)
ALT SERPL-CCNC: 15 U/L (ref 0–41)
ANION GAP SERPL CALCULATED.3IONS-SCNC: 13 MEQ/L (ref 9–15)
AST SERPL-CCNC: 12 U/L (ref 0–40)
BASOPHILS # BLD: 0 K/UL (ref 0–0.2)
BASOPHILS NFR BLD: 0.5 %
BILIRUB SERPL-MCNC: <0.2 MG/DL (ref 0.2–0.7)
BUN SERPL-MCNC: 53 MG/DL (ref 8–23)
CALCIUM SERPL-MCNC: 8.1 MG/DL (ref 8.5–9.9)
CHLORIDE SERPL-SCNC: 107 MEQ/L (ref 95–107)
CO2 SERPL-SCNC: 19 MEQ/L (ref 20–31)
CREAT SERPL-MCNC: 5.48 MG/DL (ref 0.7–1.2)
EOSINOPHIL # BLD: 0.3 K/UL (ref 0–0.7)
EOSINOPHIL NFR BLD: 4.7 %
ERYTHROCYTE [DISTWIDTH] IN BLOOD BY AUTOMATED COUNT: 13.4 % (ref 11.5–14.5)
GLOBULIN SER CALC-MCNC: 2.5 G/DL (ref 2.3–3.5)
GLUCOSE BLD-MCNC: 139 MG/DL (ref 70–99)
GLUCOSE BLD-MCNC: 153 MG/DL (ref 70–99)
GLUCOSE BLD-MCNC: 183 MG/DL (ref 70–99)
GLUCOSE BLD-MCNC: 253 MG/DL (ref 70–99)
GLUCOSE SERPL-MCNC: 161 MG/DL (ref 70–99)
HCT VFR BLD AUTO: 27.8 % (ref 42–52)
HGB BLD-MCNC: 9 G/DL (ref 14–18)
LACTATE BLDV-SCNC: 0.9 MMOL/L (ref 0.5–2.2)
LYMPHOCYTES # BLD: 1.7 K/UL (ref 1–4.8)
LYMPHOCYTES NFR BLD: 30 %
MCH RBC QN AUTO: 29.6 PG (ref 27–31.3)
MCHC RBC AUTO-ENTMCNC: 32.4 % (ref 33–37)
MCV RBC AUTO: 91.4 FL (ref 79–92.2)
MONOCYTES # BLD: 0.7 K/UL (ref 0.2–0.8)
MONOCYTES NFR BLD: 12.8 %
NEUTROPHILS # BLD: 2.9 K/UL (ref 1.4–6.5)
NEUTS SEG NFR BLD: 51.6 %
PERFORMED ON: ABNORMAL
PLATELET # BLD AUTO: 165 K/UL (ref 130–400)
POTASSIUM SERPL-SCNC: 4.3 MEQ/L (ref 3.4–4.9)
PROT SERPL-MCNC: 6.1 G/DL (ref 6.3–8)
RBC # BLD AUTO: 3.04 M/UL (ref 4.7–6.1)
SODIUM SERPL-SCNC: 139 MEQ/L (ref 135–144)
WBC # BLD AUTO: 5.5 K/UL (ref 4.8–10.8)

## 2024-03-22 PROCEDURE — 6360000002 HC RX W HCPCS

## 2024-03-22 PROCEDURE — 80053 COMPREHEN METABOLIC PANEL: CPT

## 2024-03-22 PROCEDURE — 6370000000 HC RX 637 (ALT 250 FOR IP)

## 2024-03-22 PROCEDURE — 83605 ASSAY OF LACTIC ACID: CPT

## 2024-03-22 PROCEDURE — 2060000000 HC ICU INTERMEDIATE R&B

## 2024-03-22 PROCEDURE — 6370000000 HC RX 637 (ALT 250 FOR IP): Performed by: INTERNAL MEDICINE

## 2024-03-22 PROCEDURE — 36415 COLL VENOUS BLD VENIPUNCTURE: CPT

## 2024-03-22 PROCEDURE — 85025 COMPLETE CBC W/AUTO DIFF WBC: CPT

## 2024-03-22 PROCEDURE — 99231 SBSQ HOSP IP/OBS SF/LOW 25: CPT | Performed by: PHYSICIAN ASSISTANT

## 2024-03-22 PROCEDURE — 2580000003 HC RX 258

## 2024-03-22 PROCEDURE — 2580000003 HC RX 258: Performed by: INTERNAL MEDICINE

## 2024-03-22 RX ORDER — HYDRALAZINE HYDROCHLORIDE 25 MG/1
25 TABLET, FILM COATED ORAL 2 TIMES DAILY
Status: DISCONTINUED | OUTPATIENT
Start: 2024-03-22 | End: 2024-03-23

## 2024-03-22 RX ORDER — HYDRALAZINE HYDROCHLORIDE 25 MG/1
25 TABLET, FILM COATED ORAL EVERY 8 HOURS SCHEDULED
Status: DISCONTINUED | OUTPATIENT
Start: 2024-03-22 | End: 2024-03-22

## 2024-03-22 RX ADMIN — HEPARIN SODIUM 5000 UNITS: 5000 INJECTION INTRAVENOUS; SUBCUTANEOUS at 21:30

## 2024-03-22 RX ADMIN — HYDRALAZINE HYDROCHLORIDE 10 MG: 20 INJECTION INTRAMUSCULAR; INTRAVENOUS at 07:57

## 2024-03-22 RX ADMIN — HEPARIN SODIUM 5000 UNITS: 5000 INJECTION INTRAVENOUS; SUBCUTANEOUS at 06:03

## 2024-03-22 RX ADMIN — TAMSULOSIN HYDROCHLORIDE 0.4 MG: 0.4 CAPSULE ORAL at 07:57

## 2024-03-22 RX ADMIN — SODIUM CHLORIDE: 9 INJECTION, SOLUTION INTRAVENOUS at 00:52

## 2024-03-22 RX ADMIN — HYDRALAZINE HYDROCHLORIDE 25 MG: 25 TABLET ORAL at 17:20

## 2024-03-22 RX ADMIN — ATORVASTATIN CALCIUM 40 MG: 40 TABLET, FILM COATED ORAL at 21:30

## 2024-03-22 RX ADMIN — CYANOCOBALAMIN TAB 500 MCG 500 MCG: 500 TAB at 07:57

## 2024-03-22 RX ADMIN — INSULIN LISPRO 4 UNITS: 100 INJECTION, SOLUTION INTRAVENOUS; SUBCUTANEOUS at 11:46

## 2024-03-22 RX ADMIN — CARVEDILOL 12.5 MG: 12.5 TABLET, FILM COATED ORAL at 17:20

## 2024-03-22 RX ADMIN — SODIUM CHLORIDE: 9 INJECTION, SOLUTION INTRAVENOUS at 11:49

## 2024-03-22 RX ADMIN — CARVEDILOL 12.5 MG: 12.5 TABLET, FILM COATED ORAL at 07:57

## 2024-03-22 RX ADMIN — Medication 5 ML: at 21:30

## 2024-03-22 RX ADMIN — HEPARIN SODIUM 5000 UNITS: 5000 INJECTION INTRAVENOUS; SUBCUTANEOUS at 17:20

## 2024-03-22 RX ADMIN — Medication 10 ML: at 07:58

## 2024-03-22 RX ADMIN — ASPIRIN 325 MG: 325 TABLET, COATED ORAL at 07:57

## 2024-03-22 RX ADMIN — HYDRALAZINE HYDROCHLORIDE 25 MG: 25 TABLET ORAL at 11:46

## 2024-03-22 RX ADMIN — AMLODIPINE BESYLATE 5 MG: 5 TABLET ORAL at 07:57

## 2024-03-22 NOTE — CARE COORDINATION
QUALITY ROUNDS COMPLETE WITH CARE TEAM. PER PRIMARY RN, PATIENT AMBULATING WELL IN ROOM. D/C PLAN REMAINS HOME AT THIS TIME.

## 2024-03-23 LAB
ALBUMIN SERPL-MCNC: 3.9 G/DL (ref 3.5–4.6)
ALP SERPL-CCNC: 52 U/L (ref 35–104)
ALT SERPL-CCNC: 22 U/L (ref 0–41)
ANION GAP SERPL CALCULATED.3IONS-SCNC: 12 MEQ/L (ref 9–15)
AST SERPL-CCNC: 17 U/L (ref 0–40)
BASOPHILS # BLD: 0 K/UL (ref 0–0.2)
BASOPHILS NFR BLD: 0.4 %
BILIRUB SERPL-MCNC: <0.2 MG/DL (ref 0.2–0.7)
BUN SERPL-MCNC: 51 MG/DL (ref 8–23)
CALCIUM SERPL-MCNC: 8.2 MG/DL (ref 8.5–9.9)
CHLORIDE SERPL-SCNC: 108 MEQ/L (ref 95–107)
CO2 SERPL-SCNC: 18 MEQ/L (ref 20–31)
CREAT SERPL-MCNC: 5.1 MG/DL (ref 0.7–1.2)
EOSINOPHIL # BLD: 0.3 K/UL (ref 0–0.7)
EOSINOPHIL NFR BLD: 4.2 %
ERYTHROCYTE [DISTWIDTH] IN BLOOD BY AUTOMATED COUNT: 13.4 % (ref 11.5–14.5)
GLOBULIN SER CALC-MCNC: 2.8 G/DL (ref 2.3–3.5)
GLUCOSE BLD-MCNC: 169 MG/DL (ref 70–99)
GLUCOSE BLD-MCNC: 179 MG/DL (ref 70–99)
GLUCOSE BLD-MCNC: 260 MG/DL (ref 70–99)
GLUCOSE BLD-MCNC: 261 MG/DL (ref 70–99)
GLUCOSE SERPL-MCNC: 173 MG/DL (ref 70–99)
HCT VFR BLD AUTO: 28.1 % (ref 42–52)
HGB BLD-MCNC: 9.1 G/DL (ref 14–18)
LYMPHOCYTES # BLD: 1.3 K/UL (ref 1–4.8)
LYMPHOCYTES NFR BLD: 17.7 %
MCH RBC QN AUTO: 29.4 PG (ref 27–31.3)
MCHC RBC AUTO-ENTMCNC: 32.4 % (ref 33–37)
MCV RBC AUTO: 90.6 FL (ref 79–92.2)
MONOCYTES # BLD: 0.8 K/UL (ref 0.2–0.8)
MONOCYTES NFR BLD: 11 %
NEUTROPHILS # BLD: 4.9 K/UL (ref 1.4–6.5)
NEUTS SEG NFR BLD: 66.4 %
PERFORMED ON: ABNORMAL
PLATELET # BLD AUTO: 184 K/UL (ref 130–400)
POTASSIUM SERPL-SCNC: 4.2 MEQ/L (ref 3.4–4.9)
POTASSIUM SERPL-SCNC: 4.2 MEQ/L (ref 3.4–4.9)
PROT SERPL-MCNC: 6.7 G/DL (ref 6.3–8)
RBC # BLD AUTO: 3.1 M/UL (ref 4.7–6.1)
SODIUM SERPL-SCNC: 138 MEQ/L (ref 135–144)
WBC # BLD AUTO: 7.4 K/UL (ref 4.8–10.8)

## 2024-03-23 PROCEDURE — 80053 COMPREHEN METABOLIC PANEL: CPT

## 2024-03-23 PROCEDURE — 6370000000 HC RX 637 (ALT 250 FOR IP)

## 2024-03-23 PROCEDURE — 2580000003 HC RX 258

## 2024-03-23 PROCEDURE — 6370000000 HC RX 637 (ALT 250 FOR IP): Performed by: INTERNAL MEDICINE

## 2024-03-23 PROCEDURE — 6360000002 HC RX W HCPCS

## 2024-03-23 PROCEDURE — 2060000000 HC ICU INTERMEDIATE R&B

## 2024-03-23 PROCEDURE — 2580000003 HC RX 258: Performed by: FAMILY MEDICINE

## 2024-03-23 PROCEDURE — 85025 COMPLETE CBC W/AUTO DIFF WBC: CPT

## 2024-03-23 PROCEDURE — 6360000002 HC RX W HCPCS: Performed by: NURSE PRACTITIONER

## 2024-03-23 PROCEDURE — 36415 COLL VENOUS BLD VENIPUNCTURE: CPT

## 2024-03-23 RX ORDER — SODIUM CHLORIDE 9 MG/ML
INJECTION, SOLUTION INTRAVENOUS CONTINUOUS
Status: DISCONTINUED | OUTPATIENT
Start: 2024-03-23 | End: 2024-03-24

## 2024-03-23 RX ORDER — OLANZAPINE 10 MG/2ML
5 INJECTION, POWDER, FOR SOLUTION INTRAMUSCULAR
Status: DISCONTINUED | OUTPATIENT
Start: 2024-03-23 | End: 2024-03-23

## 2024-03-23 RX ORDER — OLANZAPINE 10 MG/2ML
5 INJECTION, POWDER, FOR SOLUTION INTRAMUSCULAR ONCE
Status: COMPLETED | OUTPATIENT
Start: 2024-03-23 | End: 2024-03-23

## 2024-03-23 RX ORDER — HYDRALAZINE HYDROCHLORIDE 50 MG/1
50 TABLET, FILM COATED ORAL 2 TIMES DAILY
Status: DISCONTINUED | OUTPATIENT
Start: 2024-03-23 | End: 2024-03-26 | Stop reason: HOSPADM

## 2024-03-23 RX ADMIN — HYDRALAZINE HYDROCHLORIDE 10 MG: 20 INJECTION INTRAMUSCULAR; INTRAVENOUS at 00:25

## 2024-03-23 RX ADMIN — AMLODIPINE BESYLATE 5 MG: 5 TABLET ORAL at 07:10

## 2024-03-23 RX ADMIN — CYANOCOBALAMIN TAB 500 MCG 500 MCG: 500 TAB at 07:10

## 2024-03-23 RX ADMIN — ATORVASTATIN CALCIUM 40 MG: 40 TABLET, FILM COATED ORAL at 21:53

## 2024-03-23 RX ADMIN — HEPARIN SODIUM 5000 UNITS: 5000 INJECTION INTRAVENOUS; SUBCUTANEOUS at 21:53

## 2024-03-23 RX ADMIN — ACETAMINOPHEN 325MG 650 MG: 325 TABLET ORAL at 09:15

## 2024-03-23 RX ADMIN — INSULIN LISPRO 4 UNITS: 100 INJECTION, SOLUTION INTRAVENOUS; SUBCUTANEOUS at 11:22

## 2024-03-23 RX ADMIN — OLANZAPINE 5 MG: 10 INJECTION, POWDER, LYOPHILIZED, FOR SOLUTION INTRAMUSCULAR at 19:30

## 2024-03-23 RX ADMIN — HYDRALAZINE HYDROCHLORIDE 50 MG: 50 TABLET ORAL at 16:56

## 2024-03-23 RX ADMIN — CARVEDILOL 12.5 MG: 12.5 TABLET, FILM COATED ORAL at 07:10

## 2024-03-23 RX ADMIN — LABETALOL HYDROCHLORIDE 10 MG: 5 INJECTION, SOLUTION INTRAVENOUS at 04:29

## 2024-03-23 RX ADMIN — HYDRALAZINE HYDROCHLORIDE 10 MG: 20 INJECTION INTRAMUSCULAR; INTRAVENOUS at 17:57

## 2024-03-23 RX ADMIN — CARVEDILOL 12.5 MG: 12.5 TABLET, FILM COATED ORAL at 16:56

## 2024-03-23 RX ADMIN — SODIUM CHLORIDE: 9 INJECTION, SOLUTION INTRAVENOUS at 11:21

## 2024-03-23 RX ADMIN — HEPARIN SODIUM 5000 UNITS: 5000 INJECTION INTRAVENOUS; SUBCUTANEOUS at 05:45

## 2024-03-23 RX ADMIN — HEPARIN SODIUM 5000 UNITS: 5000 INJECTION INTRAVENOUS; SUBCUTANEOUS at 14:04

## 2024-03-23 RX ADMIN — Medication 10 ML: at 21:53

## 2024-03-23 RX ADMIN — TAMSULOSIN HYDROCHLORIDE 0.4 MG: 0.4 CAPSULE ORAL at 07:10

## 2024-03-23 RX ADMIN — SODIUM CHLORIDE: 9 INJECTION, SOLUTION INTRAVENOUS at 21:59

## 2024-03-23 RX ADMIN — Medication 10 ML: at 07:10

## 2024-03-23 RX ADMIN — HYDRALAZINE HYDROCHLORIDE 25 MG: 25 TABLET ORAL at 07:10

## 2024-03-23 RX ADMIN — ASPIRIN 325 MG: 325 TABLET, COATED ORAL at 07:10

## 2024-03-23 ASSESSMENT — PAIN DESCRIPTION - LOCATION: LOCATION: HEAD

## 2024-03-23 ASSESSMENT — PAIN SCALES - GENERAL
PAINLEVEL_OUTOF10: 4
PAINLEVEL_OUTOF10: 2

## 2024-03-23 ASSESSMENT — PAIN DESCRIPTION - DESCRIPTORS: DESCRIPTORS: ACHING

## 2024-03-23 ASSESSMENT — PAIN - FUNCTIONAL ASSESSMENT: PAIN_FUNCTIONAL_ASSESSMENT: ACTIVITIES ARE NOT PREVENTED

## 2024-03-23 ASSESSMENT — PAIN DESCRIPTION - ORIENTATION: ORIENTATION: MID

## 2024-03-23 NOTE — FLOWSHEET NOTE
1049: Spoke with Dr Antonio, new orders obtained verbally to reorder fluids, fluids reordered. .Electronically signed by Zuleika Chery RN on 3/23/2024 at 10:50 AM  1348: IV access to Left AC leaking, new IV access 22 gauge placed to left forearm, patient tolerated well. .Electronically signed by Zuleika Chery RN on 3/23/2024 at 1:49 PM

## 2024-03-23 NOTE — PLAN OF CARE
Patient progressing towards discharge    Problem: Discharge Planning  Goal: Discharge to home or other facility with appropriate resources  Outcome: Progressing  Flowsheets  Taken 3/23/2024 0800 by Zuleika Chery RN  Discharge to home or other facility with appropriate resources: Identify barriers to discharge with patient and caregiver  Taken 3/22/2024 2015 by Heber Murray RN  Discharge to home or other facility with appropriate resources:   Identify barriers to discharge with patient and caregiver   Arrange for needed discharge resources and transportation as appropriate   Identify discharge learning needs (meds, wound care, etc)   Refer to discharge planning if patient needs post-hospital services based on physician order or complex needs related to functional status, cognitive ability or social support system     Problem: Pain  Goal: Verbalizes/displays adequate comfort level or baseline comfort level  Outcome: Progressing     Problem: Skin/Tissue Integrity  Goal: Absence of new skin breakdown  Description: 1.  Monitor for areas of redness and/or skin breakdown  2.  Assess vascular access sites hourly  3.  Every 4-6 hours minimum:  Change oxygen saturation probe site  4.  Every 4-6 hours:  If on nasal continuous positive airway pressure, respiratory therapy assess nares and determine need for appliance change or resting period.  Outcome: Progressing     Problem: Safety - Adult  Goal: Free from fall injury  Outcome: Progressing     Problem: Chronic Conditions and Co-morbidities  Goal: Patient's chronic conditions and co-morbidity symptoms are monitored and maintained or improved  Outcome: Progressing  Flowsheets  Taken 3/23/2024 0800 by Zuleika Chery, RN  Care Plan - Patient's Chronic Conditions and Co-Morbidity Symptoms are Monitored and Maintained or Improved: Monitor and assess patient's chronic conditions and comorbid symptoms for stability, deterioration, or improvement  Taken 3/22/2024 2015 by Trevor

## 2024-03-24 LAB
ANION GAP SERPL CALCULATED.3IONS-SCNC: 16 MEQ/L (ref 9–15)
BASOPHILS # BLD: 0 K/UL (ref 0–0.2)
BASOPHILS NFR BLD: 0.3 %
BUN SERPL-MCNC: 46 MG/DL (ref 8–23)
CALCIUM SERPL-MCNC: 8 MG/DL (ref 8.5–9.9)
CHLORIDE SERPL-SCNC: 108 MEQ/L (ref 95–107)
CO2 SERPL-SCNC: 17 MEQ/L (ref 20–31)
CREAT SERPL-MCNC: 5.14 MG/DL (ref 0.7–1.2)
EOSINOPHIL # BLD: 0.2 K/UL (ref 0–0.7)
EOSINOPHIL NFR BLD: 2.7 %
ERYTHROCYTE [DISTWIDTH] IN BLOOD BY AUTOMATED COUNT: 13.4 % (ref 11.5–14.5)
GLUCOSE BLD-MCNC: 169 MG/DL (ref 70–99)
GLUCOSE BLD-MCNC: 213 MG/DL (ref 70–99)
GLUCOSE SERPL-MCNC: 161 MG/DL (ref 70–99)
HCT VFR BLD AUTO: 26.6 % (ref 42–52)
HGB BLD-MCNC: 9 G/DL (ref 14–18)
LYMPHOCYTES # BLD: 1.4 K/UL (ref 1–4.8)
LYMPHOCYTES NFR BLD: 19.4 %
MCH RBC QN AUTO: 29.9 PG (ref 27–31.3)
MCHC RBC AUTO-ENTMCNC: 33.8 % (ref 33–37)
MCV RBC AUTO: 88.4 FL (ref 79–92.2)
MONOCYTES # BLD: 1 K/UL (ref 0.2–0.8)
MONOCYTES NFR BLD: 12.9 %
NEUTROPHILS # BLD: 4.8 K/UL (ref 1.4–6.5)
NEUTS SEG NFR BLD: 64.3 %
PERFORMED ON: ABNORMAL
PERFORMED ON: ABNORMAL
PLATELET # BLD AUTO: 189 K/UL (ref 130–400)
POTASSIUM SERPL-SCNC: 4 MEQ/L (ref 3.4–4.9)
RBC # BLD AUTO: 3.01 M/UL (ref 4.7–6.1)
SODIUM SERPL-SCNC: 141 MEQ/L (ref 135–144)
WBC # BLD AUTO: 7.4 K/UL (ref 4.8–10.8)

## 2024-03-24 PROCEDURE — 6360000002 HC RX W HCPCS

## 2024-03-24 PROCEDURE — 85025 COMPLETE CBC W/AUTO DIFF WBC: CPT

## 2024-03-24 PROCEDURE — 80048 BASIC METABOLIC PNL TOTAL CA: CPT

## 2024-03-24 PROCEDURE — 2580000003 HC RX 258

## 2024-03-24 PROCEDURE — 6370000000 HC RX 637 (ALT 250 FOR IP): Performed by: INTERNAL MEDICINE

## 2024-03-24 PROCEDURE — 6370000000 HC RX 637 (ALT 250 FOR IP)

## 2024-03-24 PROCEDURE — 2060000000 HC ICU INTERMEDIATE R&B

## 2024-03-24 PROCEDURE — 36415 COLL VENOUS BLD VENIPUNCTURE: CPT

## 2024-03-24 RX ORDER — CARVEDILOL 25 MG/1
25 TABLET ORAL 2 TIMES DAILY WITH MEALS
Status: DISCONTINUED | OUTPATIENT
Start: 2024-03-24 | End: 2024-03-26 | Stop reason: HOSPADM

## 2024-03-24 RX ORDER — HALOPERIDOL 5 MG/ML
1 INJECTION INTRAMUSCULAR EVERY 6 HOURS PRN
Status: DISCONTINUED | OUTPATIENT
Start: 2024-03-24 | End: 2024-03-26 | Stop reason: HOSPADM

## 2024-03-24 RX ADMIN — CARVEDILOL 25 MG: 25 TABLET, FILM COATED ORAL at 16:00

## 2024-03-24 RX ADMIN — ASPIRIN 325 MG: 325 TABLET, COATED ORAL at 08:49

## 2024-03-24 RX ADMIN — INSULIN LISPRO 2 UNITS: 100 INJECTION, SOLUTION INTRAVENOUS; SUBCUTANEOUS at 12:00

## 2024-03-24 RX ADMIN — AMLODIPINE BESYLATE 5 MG: 5 TABLET ORAL at 08:46

## 2024-03-24 RX ADMIN — ACETAMINOPHEN 325MG 650 MG: 325 TABLET ORAL at 15:43

## 2024-03-24 RX ADMIN — INSULIN LISPRO 4 UNITS: 100 INJECTION, SOLUTION INTRAVENOUS; SUBCUTANEOUS at 21:16

## 2024-03-24 RX ADMIN — ATORVASTATIN CALCIUM 40 MG: 40 TABLET, FILM COATED ORAL at 21:13

## 2024-03-24 RX ADMIN — HYDRALAZINE HYDROCHLORIDE 50 MG: 50 TABLET ORAL at 08:46

## 2024-03-24 RX ADMIN — CYANOCOBALAMIN TAB 500 MCG 500 MCG: 500 TAB at 08:46

## 2024-03-24 RX ADMIN — Medication 10 ML: at 08:46

## 2024-03-24 RX ADMIN — ACETAMINOPHEN 325MG 650 MG: 325 TABLET ORAL at 08:46

## 2024-03-24 RX ADMIN — HEPARIN SODIUM 5000 UNITS: 5000 INJECTION INTRAVENOUS; SUBCUTANEOUS at 14:29

## 2024-03-24 RX ADMIN — HYDRALAZINE HYDROCHLORIDE 50 MG: 50 TABLET ORAL at 21:13

## 2024-03-24 RX ADMIN — TAMSULOSIN HYDROCHLORIDE 0.4 MG: 0.4 CAPSULE ORAL at 08:46

## 2024-03-24 RX ADMIN — HEPARIN SODIUM 5000 UNITS: 5000 INJECTION INTRAVENOUS; SUBCUTANEOUS at 06:23

## 2024-03-24 RX ADMIN — HYDRALAZINE HYDROCHLORIDE 10 MG: 20 INJECTION INTRAMUSCULAR; INTRAVENOUS at 06:58

## 2024-03-24 RX ADMIN — HEPARIN SODIUM 5000 UNITS: 5000 INJECTION INTRAVENOUS; SUBCUTANEOUS at 21:14

## 2024-03-24 RX ADMIN — Medication 10 ML: at 21:16

## 2024-03-24 ASSESSMENT — PAIN SCALES - GENERAL
PAINLEVEL_OUTOF10: 3
PAINLEVEL_OUTOF10: 0
PAINLEVEL_OUTOF10: 3
PAINLEVEL_OUTOF10: 0

## 2024-03-24 ASSESSMENT — PAIN DESCRIPTION - FREQUENCY
FREQUENCY: INTERMITTENT

## 2024-03-24 ASSESSMENT — PAIN DESCRIPTION - DESCRIPTORS
DESCRIPTORS: DULL
DESCRIPTORS: DULL

## 2024-03-24 ASSESSMENT — PAIN DESCRIPTION - LOCATION
LOCATION: HEAD
LOCATION: GENERALIZED
LOCATION: HEAD
LOCATION: GENERALIZED
LOCATION: HEAD

## 2024-03-24 ASSESSMENT — PAIN - FUNCTIONAL ASSESSMENT
PAIN_FUNCTIONAL_ASSESSMENT: ACTIVITIES ARE NOT PREVENTED

## 2024-03-24 ASSESSMENT — PAIN DESCRIPTION - PAIN TYPE
TYPE: ACUTE PAIN
TYPE: ACUTE PAIN;CHRONIC PAIN
TYPE: ACUTE PAIN;CHRONIC PAIN

## 2024-03-24 ASSESSMENT — PAIN DESCRIPTION - ORIENTATION
ORIENTATION: RIGHT;LEFT

## 2024-03-24 NOTE — CARE COORDINATION
BP MEDICATIONS INCREASED, REPEAT UA TODAY. DC PLAN REMAINS HOME WITH DTR ONCE MEDICALLY CLEARED. PT INDEPENDENT.

## 2024-03-24 NOTE — CARE COORDINATION
LSW received request from RN to met with pt's daughters at bedside. Family have concerns regarding DC plan home.     LSW met with pt and both daughter, Carmencita and Denise at bedside. Per Denise report, pt lives at home with Denise, independent at baseline. Sometime very forgetful but is redirectable. Pt still drives to and do all ALD's.     Family would like PT/OT order to see if pt is able to go to SNF.   LSW explained in details, if PT/OT does not recommend SNF then Insurance will not approve SNF placement.   Majority of pt's family is about 2.5 hours away.     [facility] is [facility].   [facility]  [id number] [place] [address]  Tel [phone]    Family is hoping pt will get back to his baseline and return home. Eventually placement for long term care.   LSW provided information regarding [facility]. Family gave permission for referral.   Referral sent through Email. [facility] will reach out to pt's family to work on back up plan: Placement from Community.     Pt was moved from  to 178.   Pending PT/OT eval.     LSW will continue to follow.    Electronically signed by CARA Palacios on 3/24/2024 at 3:10 PM

## 2024-03-25 LAB
ALBUMIN SERPL-MCNC: 3.9 G/DL (ref 3.5–4.6)
ALP SERPL-CCNC: 53 U/L (ref 35–104)
ALT SERPL-CCNC: 29 U/L (ref 0–41)
ANION GAP SERPL CALCULATED.3IONS-SCNC: 19 MEQ/L (ref 9–15)
AST SERPL-CCNC: 20 U/L (ref 0–40)
BILIRUB SERPL-MCNC: 0.3 MG/DL (ref 0.2–0.7)
BUN SERPL-MCNC: 45 MG/DL (ref 8–23)
CALCIUM SERPL-MCNC: 8.5 MG/DL (ref 8.5–9.9)
CHLORIDE SERPL-SCNC: 105 MEQ/L (ref 95–107)
CO2 SERPL-SCNC: 18 MEQ/L (ref 20–31)
CREAT SERPL-MCNC: 5.25 MG/DL (ref 0.7–1.2)
CREAT UR-MCNC: 52.6 MG/DL
CREAT UR-MCNC: 52.7 MG/DL
GLOBULIN SER CALC-MCNC: 3 G/DL (ref 2.3–3.5)
GLUCOSE BLD-MCNC: 105 MG/DL (ref 70–99)
GLUCOSE BLD-MCNC: 150 MG/DL (ref 70–99)
GLUCOSE BLD-MCNC: 279 MG/DL (ref 70–99)
GLUCOSE BLD-MCNC: 389 MG/DL (ref 70–99)
GLUCOSE SERPL-MCNC: 134 MG/DL (ref 70–99)
PERFORMED ON: ABNORMAL
POTASSIUM SERPL-SCNC: 3.8 MEQ/L (ref 3.4–4.9)
PROT SERPL-MCNC: 6.9 G/DL (ref 6.3–8)
PROT UR-MCNC: 21 MG/DL
PROT/CREAT UR-RTO: 0.4 ML/ML
PROT/CREAT UR-RTO: 0.4 ML/ML (ref 0–0.2)
SODIUM SERPL-SCNC: 142 MEQ/L (ref 135–144)

## 2024-03-25 PROCEDURE — 36415 COLL VENOUS BLD VENIPUNCTURE: CPT

## 2024-03-25 PROCEDURE — 2580000003 HC RX 258: Performed by: INTERNAL MEDICINE

## 2024-03-25 PROCEDURE — 6370000000 HC RX 637 (ALT 250 FOR IP): Performed by: INTERNAL MEDICINE

## 2024-03-25 PROCEDURE — 6370000000 HC RX 637 (ALT 250 FOR IP)

## 2024-03-25 PROCEDURE — 2060000000 HC ICU INTERMEDIATE R&B

## 2024-03-25 PROCEDURE — 82570 ASSAY OF URINE CREATININE: CPT

## 2024-03-25 PROCEDURE — 6360000002 HC RX W HCPCS

## 2024-03-25 PROCEDURE — 84156 ASSAY OF PROTEIN URINE: CPT

## 2024-03-25 PROCEDURE — 80053 COMPREHEN METABOLIC PANEL: CPT

## 2024-03-25 PROCEDURE — 97161 PT EVAL LOW COMPLEX 20 MIN: CPT

## 2024-03-25 PROCEDURE — 2580000003 HC RX 258

## 2024-03-25 PROCEDURE — 6360000002 HC RX W HCPCS: Performed by: INTERNAL MEDICINE

## 2024-03-25 PROCEDURE — 99231 SBSQ HOSP IP/OBS SF/LOW 25: CPT | Performed by: PHYSICIAN ASSISTANT

## 2024-03-25 RX ORDER — SODIUM CHLORIDE, SODIUM LACTATE, POTASSIUM CHLORIDE, CALCIUM CHLORIDE 600; 310; 30; 20 MG/100ML; MG/100ML; MG/100ML; MG/100ML
INJECTION, SOLUTION INTRAVENOUS CONTINUOUS
Status: DISCONTINUED | OUTPATIENT
Start: 2024-03-25 | End: 2024-03-26 | Stop reason: HOSPADM

## 2024-03-25 RX ORDER — SPIRONOLACTONE 25 MG/1
25 TABLET ORAL DAILY
Status: DISCONTINUED | OUTPATIENT
Start: 2024-03-25 | End: 2024-03-26 | Stop reason: HOSPADM

## 2024-03-25 RX ORDER — CARVEDILOL 25 MG/1
25 TABLET ORAL 2 TIMES DAILY WITH MEALS
Qty: 60 TABLET | Refills: 0 | Status: SHIPPED | OUTPATIENT
Start: 2024-03-25

## 2024-03-25 RX ORDER — LABETALOL HYDROCHLORIDE 5 MG/ML
20 INJECTION, SOLUTION INTRAVENOUS EVERY 4 HOURS PRN
Status: DISCONTINUED | OUTPATIENT
Start: 2024-03-25 | End: 2024-03-26 | Stop reason: HOSPADM

## 2024-03-25 RX ORDER — TAMSULOSIN HYDROCHLORIDE 0.4 MG/1
0.4 CAPSULE ORAL DAILY
Qty: 30 CAPSULE | Refills: 0 | Status: SHIPPED | OUTPATIENT
Start: 2024-03-26

## 2024-03-25 RX ORDER — SPIRONOLACTONE 25 MG/1
25 TABLET ORAL DAILY
Qty: 30 TABLET | Refills: 0 | Status: SHIPPED | OUTPATIENT
Start: 2024-03-26

## 2024-03-25 RX ADMIN — AMLODIPINE BESYLATE 5 MG: 5 TABLET ORAL at 10:16

## 2024-03-25 RX ADMIN — TAMSULOSIN HYDROCHLORIDE 0.4 MG: 0.4 CAPSULE ORAL at 10:16

## 2024-03-25 RX ADMIN — ATORVASTATIN CALCIUM 40 MG: 40 TABLET, FILM COATED ORAL at 20:05

## 2024-03-25 RX ADMIN — Medication 10 ML: at 20:05

## 2024-03-25 RX ADMIN — CYANOCOBALAMIN TAB 500 MCG 500 MCG: 500 TAB at 10:16

## 2024-03-25 RX ADMIN — INSULIN LISPRO 2 UNITS: 100 INJECTION, SOLUTION INTRAVENOUS; SUBCUTANEOUS at 10:09

## 2024-03-25 RX ADMIN — ASPIRIN 325 MG: 325 TABLET, COATED ORAL at 10:16

## 2024-03-25 RX ADMIN — HYDRALAZINE HYDROCHLORIDE 50 MG: 50 TABLET ORAL at 16:52

## 2024-03-25 RX ADMIN — HEPARIN SODIUM 5000 UNITS: 5000 INJECTION INTRAVENOUS; SUBCUTANEOUS at 14:07

## 2024-03-25 RX ADMIN — CARVEDILOL 25 MG: 25 TABLET, FILM COATED ORAL at 16:52

## 2024-03-25 RX ADMIN — SPIRONOLACTONE 25 MG: 25 TABLET ORAL at 11:46

## 2024-03-25 RX ADMIN — Medication 10 ML: at 10:19

## 2024-03-25 RX ADMIN — INSULIN LISPRO 8 UNITS: 100 INJECTION, SOLUTION INTRAVENOUS; SUBCUTANEOUS at 12:39

## 2024-03-25 RX ADMIN — SODIUM CHLORIDE, POTASSIUM CHLORIDE, SODIUM LACTATE AND CALCIUM CHLORIDE: 600; 310; 30; 20 INJECTION, SOLUTION INTRAVENOUS at 11:46

## 2024-03-25 RX ADMIN — LABETALOL HYDROCHLORIDE 20 MG: 5 INJECTION, SOLUTION INTRAVENOUS at 10:12

## 2024-03-25 RX ADMIN — HEPARIN SODIUM 5000 UNITS: 5000 INJECTION INTRAVENOUS; SUBCUTANEOUS at 21:14

## 2024-03-25 RX ADMIN — HYDRALAZINE HYDROCHLORIDE 50 MG: 50 TABLET ORAL at 10:16

## 2024-03-25 RX ADMIN — HEPARIN SODIUM 5000 UNITS: 5000 INJECTION INTRAVENOUS; SUBCUTANEOUS at 05:28

## 2024-03-25 RX ADMIN — CARVEDILOL 25 MG: 25 TABLET, FILM COATED ORAL at 10:16

## 2024-03-25 ASSESSMENT — ENCOUNTER SYMPTOMS: APNEA: 0

## 2024-03-25 NOTE — CARE COORDINATION
Quality round completed with care management team. Waiting for PT/OT to eval pt.   Home with Dtr (referral to [facility] for community placement) vs [facility].     [facility] called [facility]. Spoke with Tone Lindsay.   Facility does have Male skill bed, able to accept dementia pt but does not have dementia unit. Also have Long term care bed available.     PT/OT eval: pt NOT ON PROGRAM.     LSW will call family to update.     LSW will follow.     Electronically signed by CARA Palacios on 3/25/2024 at 11:42 AM    LSW spoke with pt's Dtr via phone. Updated dtr that pt did really well with PT/OT. Unable to do SNF placement from hospital.   Denise, daughter, verbalize understanding and report, have hired Private hired Care giver in the past for pt. Agree with [facility] to reach out to her to help with future placement to SNF long term.   Daughter agree with DC plan Home with her and she will hire caregiver when need it.     Notify JASEN, [facility] to reach out to daughter, Denise.       LSW will follow.     Electronically signed by CARA Palacios on 3/25/2024 at 1:03 PM

## 2024-03-26 ENCOUNTER — APPOINTMENT (OUTPATIENT)
Dept: GENERAL RADIOLOGY | Age: 79
DRG: 683 | End: 2024-03-26
Payer: MEDICARE

## 2024-03-26 ENCOUNTER — APPOINTMENT (OUTPATIENT)
Dept: ULTRASOUND IMAGING | Age: 79
DRG: 683 | End: 2024-03-26
Payer: MEDICARE

## 2024-03-26 VITALS
HEIGHT: 70 IN | DIASTOLIC BLOOD PRESSURE: 60 MMHG | HEART RATE: 70 BPM | OXYGEN SATURATION: 96 % | TEMPERATURE: 97.2 F | RESPIRATION RATE: 18 BRPM | SYSTOLIC BLOOD PRESSURE: 164 MMHG | BODY MASS INDEX: 22.9 KG/M2 | WEIGHT: 160 LBS

## 2024-03-26 LAB
BACTERIA BLD CULT ORG #2: NORMAL
BACTERIA BLD CULT: NORMAL
C3 SERPL-MCNC: 150 MG/DL (ref 90–180)
C4 SERPL-MCNC: 37 MG/DL (ref 10–40)
GLUCOSE BLD-MCNC: 194 MG/DL (ref 70–99)
GLUCOSE BLD-MCNC: 206 MG/DL (ref 70–99)
IRON % SATURATION: 23 % (ref 20–55)
IRON: 49 UG/DL (ref 61–157)
PERFORMED ON: ABNORMAL
PERFORMED ON: ABNORMAL
TOTAL IRON BINDING CAPACITY: 209 UG/DL (ref 250–450)
UNSATURATED IRON BINDING CAPACITY: 160 UG/DL (ref 112–347)

## 2024-03-26 PROCEDURE — 6370000000 HC RX 637 (ALT 250 FOR IP)

## 2024-03-26 PROCEDURE — 99231 SBSQ HOSP IP/OBS SF/LOW 25: CPT | Performed by: PHYSICIAN ASSISTANT

## 2024-03-26 PROCEDURE — 86160 COMPLEMENT ANTIGEN: CPT

## 2024-03-26 PROCEDURE — 6370000000 HC RX 637 (ALT 250 FOR IP): Performed by: INTERNAL MEDICINE

## 2024-03-26 PROCEDURE — 84165 PROTEIN E-PHORESIS SERUM: CPT

## 2024-03-26 PROCEDURE — 6360000002 HC RX W HCPCS

## 2024-03-26 PROCEDURE — 76775 US EXAM ABDO BACK WALL LIM: CPT

## 2024-03-26 PROCEDURE — 83550 IRON BINDING TEST: CPT

## 2024-03-26 PROCEDURE — 83540 ASSAY OF IRON: CPT

## 2024-03-26 PROCEDURE — 74018 RADEX ABDOMEN 1 VIEW: CPT

## 2024-03-26 PROCEDURE — 86255 FLUORESCENT ANTIBODY SCREEN: CPT

## 2024-03-26 PROCEDURE — 93975 VASCULAR STUDY: CPT

## 2024-03-26 PROCEDURE — 2580000003 HC RX 258: Performed by: INTERNAL MEDICINE

## 2024-03-26 PROCEDURE — 2580000003 HC RX 258

## 2024-03-26 PROCEDURE — 84155 ASSAY OF PROTEIN SERUM: CPT

## 2024-03-26 RX ORDER — LACTULOSE 10 G/15ML
20 SOLUTION ORAL
Status: DISCONTINUED | OUTPATIENT
Start: 2024-03-26 | End: 2024-03-26

## 2024-03-26 RX ADMIN — CYANOCOBALAMIN TAB 500 MCG 500 MCG: 500 TAB at 11:01

## 2024-03-26 RX ADMIN — ASPIRIN 325 MG: 325 TABLET, COATED ORAL at 11:01

## 2024-03-26 RX ADMIN — HEPARIN SODIUM 5000 UNITS: 5000 INJECTION INTRAVENOUS; SUBCUTANEOUS at 06:27

## 2024-03-26 RX ADMIN — HYDRALAZINE HYDROCHLORIDE 50 MG: 50 TABLET ORAL at 11:01

## 2024-03-26 RX ADMIN — Medication 10 ML: at 11:04

## 2024-03-26 RX ADMIN — HYDRALAZINE HYDROCHLORIDE 10 MG: 20 INJECTION INTRAMUSCULAR; INTRAVENOUS at 00:45

## 2024-03-26 RX ADMIN — SPIRONOLACTONE 25 MG: 25 TABLET ORAL at 11:01

## 2024-03-26 RX ADMIN — AMLODIPINE BESYLATE 5 MG: 5 TABLET ORAL at 11:01

## 2024-03-26 RX ADMIN — CARVEDILOL 25 MG: 25 TABLET, FILM COATED ORAL at 11:01

## 2024-03-26 RX ADMIN — TAMSULOSIN HYDROCHLORIDE 0.4 MG: 0.4 CAPSULE ORAL at 11:01

## 2024-03-26 RX ADMIN — SODIUM CHLORIDE, POTASSIUM CHLORIDE, SODIUM LACTATE AND CALCIUM CHLORIDE: 600; 310; 30; 20 INJECTION, SOLUTION INTRAVENOUS at 02:41

## 2024-03-26 ASSESSMENT — ENCOUNTER SYMPTOMS: APNEA: 0

## 2024-03-26 NOTE — PROGRESS NOTES
Hospitalist Daily Progress Note  Name: Bernabe Owen  Age: 78 y.o.  Gender: male  CodeStatus: Full Code  Allergies: No Known Allergies    Chief Complaint:Hypertension (Per squad report pt has high bp, abd pain, altered mental status)      Primary Care Provider: Jennifer Ramirez PA    InpatientTreatment Team: Treatment Team: Attending Provider: Patricio Antonio MD; Consulting Physician: Phillip Salazar MD; Consulting Physician: Lorenzo Gilbert DO; Consulting Physician: Patricio Antonio MD; Registered Nurse: Zuleika Chery RN; Patient Care Tech: Mary Lou Sloan; : Aida Luke RN; Utilization Reviewer: Angélica Overton RN    Admission Date: 3/20/2024      Subjective: No chest pain, sob, nausea.  Resting in bed.     Physical Exam  Vitals and nursing note reviewed.   Constitutional:       Appearance: Normal appearance.   Cardiovascular:      Rate and Rhythm: Normal rate and regular rhythm.   Pulmonary:      Effort: Pulmonary effort is normal.      Breath sounds: Normal breath sounds.   Abdominal:      General: Bowel sounds are normal.      Palpations: Abdomen is soft.   Musculoskeletal:         General: Normal range of motion.   Skin:     General: Skin is warm and dry.   Neurological:      General: No focal deficit present.      Mental Status: He is alert. Mental status is at baseline.         Medications:  Reviewed    Infusion Medications:    sodium chloride 150 mL/hr at 03/23/24 1121    sodium chloride      dextrose       Scheduled Medications:    hydrALAZINE  50 mg Oral BID    sodium chloride flush  5-40 mL IntraVENous 2 times per day    heparin (porcine)  5,000 Units SubCUTAneous 3 times per day    insulin lispro  0-8 Units SubCUTAneous TID WC    insulin lispro  0-4 Units SubCUTAneous Nightly    aspirin  325 mg Oral Daily    atorvastatin  40 mg Oral Daily    cyanocobalamin  500 mcg Oral Daily    tamsulosin  0.4 mg Oral Daily    carvedilol  12.5 mg Oral BID WC    amLODIPine  5 mg Oral 
0900 AM: Initial assessment completed; see flow sheet for details. Patient alert and oriented to person only; reoriented to time, place and situation. Very [place].  No irritation/agitation at this time. Lungs clear. PP+2. No edema.  Patient continues to refuse to put on telemetry; will continue to try. Dr. Gilbert here and orders received to d/c IVFs, SL/d. Patient verbalized he \"just spoke with his grandson on his phone , a paramedic, and he has it all planned out\". Patient also mentioned his daughter Denise was here last night. (This information is correct). Reoriented to time and place as well as present situation.  Very easily irritated. Dr. Gilbert aware will try to get urine sample as ordered if patient cooperates;  Dr. Gilbert also aware of elevated BP and medications given as per MAR. Orders received. Will continue to provide safe environment call light in reach. Door closed for isolation precautions;contact/droplet. Will note changes.   1201 PM Daughter from out of town arrived, Carmencita. Patient did not recognize daughter; very upset.  Neuro assessment completed on patient. Patient alert, oriented to self and that \"its election year\".  Reoriented to place, time, situation.  Unable to verbalize both daughter names. Patient SAMUEL 3mm/round/equal.  Moves all extremities X4 medium strength/equal. Stands up; standby assist. Using urinal at bedside. Patient was witnessed per daughter to have put Mrs Benson in coffee and drank. Perfect serve sent to Dr. Lowry regarding patient and confusion and not recognizing family   Perfect serve acknowledged; no new orders. Will continue to reorient as needed and provide safety. Denise on her way here.   1315 PM Denise arrived; patient alert but same as noted above; does not recognize either of daughter. Both very concerned because \"he has never been this confused\". Perfect serve sent to Dr. Lowry to come and assess patient/speak with daughters regarding plan of care.   1430 PM Dr. Lowry came 
As per my telephonic communication with nephrology jacob Arguelles to discharge from nephrology perspective with outpatient follow-up in 1 to 2 weeks.    SALVATORE BURTON MD    
Nephrology Progress Note    Assessment:  DHARMESH related to dehydration/ NSAIDS slowly better  Kidney stone history  Severe dementia  AAA  DM type-2  PAD      Plan: await todays labs  no evidence of MRI NPH/CVA mass  increase apresoline 50mg bid    Patient Active Problem List:     Umbilical hernia without obstruction or gangrene     Type 2 diabetes mellitus without complication, without long-term current use of insulin ([facility])     HTN (hypertension)     Hyperlipidemia     PMR (polymyalgia rheumatica) ([facility])     Abdominal aortic aneurysm (AAA) without rupture ([facility])     Abnormal ankle brachial index (ANASTACIA)     PAD (peripheral artery disease) ([facility])     Alzheimer's dementia without behavioral disturbance ([facility])     Aneurysm, common iliac artery ([facility])     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[facility])     Cerebrovascular accident (CVA) ([facility])     Ataxic gait     MCI (mild cognitive impairment)     Bilateral carotid artery stenosis     Syncope and collapse     Dizziness     TIA (transient ischemic attack)     DHARMESH (acute kidney injury) ([facility])     Kidney stone      Subjective:  Admit Date: 3/20/2024    Interval History: pleasant  up to bathroom    Medications:  Scheduled Meds:   hydrALAZINE  25 mg Oral BID    sodium chloride flush  5-40 mL IntraVENous 2 times per day    heparin (porcine)  5,000 Units SubCUTAneous 3 times per day    insulin lispro  0-8 Units SubCUTAneous TID WC    insulin lispro  0-4 Units SubCUTAneous Nightly    aspirin  325 mg Oral Daily    atorvastatin  40 mg Oral Daily    cyanocobalamin  500 mcg Oral Daily    tamsulosin  0.4 mg Oral Daily    carvedilol  12.5 mg Oral BID WC    amLODIPine  5 mg Oral Daily     Continuous Infusions:   sodium chloride      dextrose         CBC:   Recent Labs     03/20/24 2000 03/22/24  0502   WBC 8.8 5.5   HGB 11.2* 9.0*    165     CMP:    Recent Labs     03/20/24 2000 03/21/24  0540 03/22/24  0502   * 138 139   K 5.4* 4.8 4.3   CL 97 104 107   CO2 21 18* 19*   BUN 50* 51* 53*   CREATININE 
Nephrology Progress Note    Assessment:  DHARMESH relted NSAID  Known kidney stones  Dementia severe   PAD  Aggitation better  DM type-2  Hypertension better      Plan: other labs pending but significant hx per n\daughter dehydration and NSAID usage. Most unlikely auto immue  repeat ultrasound kidneys    Patient Active Problem List:     Umbilical hernia without obstruction or gangrene     Type 2 diabetes mellitus without complication, without long-term current use of insulin ([facility])     HTN (hypertension)     Hyperlipidemia     PMR (polymyalgia rheumatica) ([facility])     Abdominal aortic aneurysm (AAA) without rupture ([facility])     Abnormal ankle brachial index (ANASTACIA)     PAD (peripheral artery disease) ([facility])     Alzheimer's dementia without behavioral disturbance ([facility])     Aneurysm, common iliac artery ([facility])     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[facility])     Cerebrovascular accident (CVA) ([facility])     Ataxic gait     MCI (mild cognitive impairment)     Bilateral carotid artery stenosis     Syncope and collapse     Dizziness     TIA (transient ischemic attack)     DHARMESH (acute kidney injury) ([facility])     Kidney stone      Subjective:  Admit Date: 3/20/2024    Interval History: stable    Medications:  Scheduled Meds:   spironolactone  25 mg Oral Daily    carvedilol  25 mg Oral BID WC    hydrALAZINE  50 mg Oral BID    sodium chloride flush  5-40 mL IntraVENous 2 times per day    heparin (porcine)  5,000 Units SubCUTAneous 3 times per day    insulin lispro  0-8 Units SubCUTAneous TID WC    insulin lispro  0-4 Units SubCUTAneous Nightly    aspirin  325 mg Oral Daily    atorvastatin  40 mg Oral Daily    cyanocobalamin  500 mcg Oral Daily    tamsulosin  0.4 mg Oral Daily    amLODIPine  5 mg Oral Daily     Continuous Infusions:   lactated ringers IV soln 75 mL/hr at 03/26/24 0241    sodium chloride      dextrose         CBC:   Recent Labs     03/24/24  0549   WBC 7.4   HGB 9.0*        CMP:    Recent Labs     03/24/24  0549 03/25/24  0528    142   K 4.0 
Nephrology Progress Note  Labs pending  Assessment:  DHARMESH  lab pending  Hypertension still issue-related aggitation  DM type-2  Dementia  Hx CVA  AAA  Anemia        Plan: repeat UA blood urine increase BP meds  give dose haldol q6 for extreme aggitation   Zyprexia somewhat helped Will trial Haldol    Patient Active Problem List:     Umbilical hernia without obstruction or gangrene     Type 2 diabetes mellitus without complication, without long-term current use of insulin ([facility])     HTN (hypertension)     Hyperlipidemia     PMR (polymyalgia rheumatica) ([facility])     Abdominal aortic aneurysm (AAA) without rupture ([facility])     Abnormal ankle brachial index (ANASTACIA)     PAD (peripheral artery disease) ([facility])     Alzheimer's dementia without behavioral disturbance ([facility])     Aneurysm, common iliac artery ([facility])     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[facility])     Cerebrovascular accident (CVA) ([facility])     Ataxic gait     MCI (mild cognitive impairment)     Bilateral carotid artery stenosis     Syncope and collapse     Dizziness     TIA (transient ischemic attack)     DHARMESH (acute kidney injury) ([facility])     Kidney stone      Subjective:  Admit Date: 3/20/2024    Interval History: same    Medications:  Scheduled Meds:   hydrALAZINE  50 mg Oral BID    sodium chloride flush  5-40 mL IntraVENous 2 times per day    heparin (porcine)  5,000 Units SubCUTAneous 3 times per day    insulin lispro  0-8 Units SubCUTAneous TID WC    insulin lispro  0-4 Units SubCUTAneous Nightly    aspirin  325 mg Oral Daily    atorvastatin  40 mg Oral Daily    cyanocobalamin  500 mcg Oral Daily    tamsulosin  0.4 mg Oral Daily    carvedilol  12.5 mg Oral BID WC    amLODIPine  5 mg Oral Daily     Continuous Infusions:   sodium chloride 150 mL/hr at 03/23/24 2159    sodium chloride      dextrose         CBC:   Recent Labs     03/23/24  0552 03/24/24  0549   WBC 7.4 7.4   HGB 9.1* 9.0*    189     CMP:    Recent Labs     03/22/24  0502 03/23/24  0552    138   K 4.3 4.2  4.2   CL 
Renal Progress Note    Assessment and Plan:    77 yo man with minimal CKD at baseline (cr in low 1's in 9/2023) but has risk factors of DM, HTN, CVA, AAA, hx of peripheral bypass admitted on 3/20 with SOB and HTN as well as some flank pain.  CT with 3 mm stone with mild hydro.  Urology saw with no intervention planned.  Hba1c 9.4.  viral panel + influenza / parainfluenza.  U/s showed about 3 cm AAA.  Bp remains high.  Urine with 1+ protein and + RBC (but may be from stone).  Was on meloxicam, glucophage and lisinopril on admission    - check serologic w/u to be complete  - restart IVF.  LR for DHARMESH  - add aldactone for bp  - check renal duplex.  Acute GN and renal artery stenosis needs to be ruled out  - check anemia w/u (had low tsat last year) and SPEP  - possible d/c soon but would like to have better handle on his DHARMESH and clear plan  - on d/c, no meloxicam, glucophage or lisinpirl      Patient Active Problem List:     Umbilical hernia without obstruction or gangrene     Type 2 diabetes mellitus without complication, without long-term current use of insulin ([facility])     HTN (hypertension)     Hyperlipidemia     PMR (polymyalgia rheumatica) ([facility])     Abdominal aortic aneurysm (AAA) without rupture ([facility])     Abnormal ankle brachial index (ANASTACIA)     PAD (peripheral artery disease) ([facility])     Alzheimer's dementia without behavioral disturbance ([facility])     Aneurysm, common iliac artery ([facility])     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[facility])     Cerebrovascular accident (CVA) ([facility])     Ataxic gait     MCI (mild cognitive impairment)     Bilateral carotid artery stenosis     Syncope and collapse     Dizziness     TIA (transient ischemic attack)     DHARMESH (acute kidney injury) ([facility])     Kidney stone      Subjective:   Admit Date: 3/20/2024    Interval History: pt with some sob.  Bp still up.  No cp.  No fevers.  Says he is urinating ok.  No flank pain      Medications:   Scheduled Meds:   carvedilol  25 mg Oral BID WC    hydrALAZINE  50 mg Oral BID    
Subjective:      Patient ID: Bernabe Owen is a 78 y.o. male    HPI  78 year old male who was diagnosed with a 3 mm right sided stone at the [facility]. He is currently denying pain and gross hematuria. He voices no other urological complaints.    Past Medical History:   Diagnosis Date    AAA (abdominal aortic aneurysm) ([facility])     Abdominal aortic aneurysm (AAA) without rupture ([facility])     Anxiety     CAD (coronary artery disease)     Callus of foot 2013    Diastasis recti 2013    DM (diabetes mellitus) ([facility])     Gout     Gout     Hearing loss     Hyperlipidemia     Hypertension     Neuropathy     secondary to DM    Neuropathy     Osteoarthritis     PMR (polymyalgia rheumatica) ([facility]) 2014    Polymyalgia rheumatica ([facility])      Past Surgical History:   Procedure Laterality Date    BACK SURGERY      CHOLECYSTECTOMY      HAND SURGERY Left 2015    dupuytren''s release with left thumb    INGUINAL HERNIA REPAIR      right side    VASCULAR SURGERY Left oct 18th 2019    lleft leg triple bypass      Social History     Socioeconomic History    Marital status:      Spouse name: None    Number of children: None    Years of education: None    Highest education level: None   Tobacco Use    Smoking status: Former     Current packs/day: 0.00     Average packs/day: 1 pack/day for 30.0 years (30.0 ttl pk-yrs)     Types: Cigarettes     Start date: 1965     Quit date: 1995     Years since quittin.3    Smokeless tobacco: Never   Vaping Use    Vaping Use: Never used   Substance and Sexual Activity    Alcohol use: No    Drug use: No    Sexual activity: Not Currently     Social Determinants of Health     Financial Resource Strain: Low Risk  (2023)    Overall Financial Resource Strain (CARDIA)     Difficulty of Paying Living Expenses: Not hard at all   Transportation Needs: Unknown (2023)    PRAPARE - Transportation     Lack of Transportation (Non-Medical): No   Physical Activity: Sufficiently 
Subjective:      Patient ID: Bernabe Owen is a 78 y.o. male    HPI78 year old male who was diagnosed with a 3 mm right sided stone at the [facility]. He is currently denying pain and gross hematuria. He voices no other urological complaints.     Past Medical History:   Diagnosis Date    AAA (abdominal aortic aneurysm) ([facility])     Abdominal aortic aneurysm (AAA) without rupture ([facility])     Anxiety     CAD (coronary artery disease)     Callus of foot 2013    Diastasis recti 2013    DM (diabetes mellitus) ([facility])     Gout     Gout     Hearing loss     Hyperlipidemia     Hypertension     Neuropathy     secondary to DM    Neuropathy     Osteoarthritis     PMR (polymyalgia rheumatica) ([facility]) 2014    Polymyalgia rheumatica ([facility])      Past Surgical History:   Procedure Laterality Date    BACK SURGERY      CHOLECYSTECTOMY      HAND SURGERY Left 2015    dupuytren''s release with left thumb    INGUINAL HERNIA REPAIR      right side    VASCULAR SURGERY Left oct 18th 2019    lleft leg triple bypass      Social History     Socioeconomic History    Marital status:      Spouse name: None    Number of children: None    Years of education: None    Highest education level: None   Tobacco Use    Smoking status: Former     Current packs/day: 0.00     Average packs/day: 1 pack/day for 30.0 years (30.0 ttl pk-yrs)     Types: Cigarettes     Start date: 1965     Quit date: 1995     Years since quittin.3    Smokeless tobacco: Never   Vaping Use    Vaping Use: Never used   Substance and Sexual Activity    Alcohol use: No    Drug use: No    Sexual activity: Not Currently     Social Determinants of Health     Financial Resource Strain: Low Risk  (2023)    Overall Financial Resource Strain (CARDIA)     Difficulty of Paying Living Expenses: Not hard at all   Transportation Needs: Unknown (2023)    PRAPARE - Transportation     Lack of Transportation (Non-Medical): No   Physical Activity: Sufficiently 
    No results for input(s): \"INR\" in the last 72 hours.  No results for input(s): \"CKTOTAL\", \"TROPONINI\" in the last 72 hours.  Radiology:  XR ABDOMEN (KUB) (SINGLE AP VIEW)   Final Result   1. Multiple bilateral renal stones.   2. Nonobstructive bowel gas pattern.         XR CHEST PORTABLE   Final Result   1. No acute cardiopulmonary process.   2. Atherosclerotic changes of the aorta without cardiomegaly.         CT Head W/O Contrast   Final Result   No acute intracranial abnormality.         CT ABDOMEN PELVIS WO CONTRAST Additional Contrast? None   Final Result   3 mm calculus at the right ureterovesicular junction results in mild   hydroureteronephrosis and perinephric stranding. Additional nonobstructive   renal calculi bilaterally.  Cannot exclude possibility of associated urinary   tract infection.      Aneurysmal dilatation of the infrarenal abdominal aorta and right common   iliac artery.         Vascular duplex abdominal aorta   Final Result   1. 3.2 cm distal abdominal aortic aneurysm.   2. 2.7 cm right common iliac artery aneurysm.           Assessment/Plan:    79 yo presented with shaun creatinine 5, flu b, parainfluenza    Shaun, creat 5.     Flu, paraflu - cough minimal, lungs clear, on room air.     Underlying history of dementia    SALVATORE BURTON MD    
03/21/24  0540 03/22/24  0502   * 138 139   K 5.4* 4.8 4.3   CL 97 104 107   CO2 21 18* 19*   BUN 50* 51* 53*   CREATININE 5.87* 5.72* 5.48*   GLUCOSE 216* 151* 161*   CALCIUM 9.3 8.4* 8.1*   LABGLOM 9.1* 9.4* 9.9*     Troponin: No results for input(s): \"TROPONINI\" in the last 72 hours.  BNP: No results for input(s): \"BNP\" in the last 72 hours.  INR: No results for input(s): \"INR\" in the last 72 hours.  Lipids: No results for input(s): \"CHOL\", \"LDLDIRECT\", \"TRIG\", \"HDL\", \"AMYLASE\", \"LIPASE\" in the last 72 hours.  Liver:   Recent Labs     03/22/24  0502   AST 12   ALT 15   ALKPHOS 47   PROT 6.1*   LABALBU 3.6   BILITOT <0.2     Iron:  No results for input(s): \"IRONS\", \"FERRITIN\" in the last 72 hours.    Invalid input(s): \"LABIRONS\"  Urinalysis: No results for input(s): \"UA\" in the last 72 hours.    Objective:  Vitals: BP (!) 189/85   Pulse 71   Temp 97.6 °F (36.4 °C) (Oral)   Resp 20   Ht 1.778 m (5' 10\")   Wt 72.6 kg (160 lb)   SpO2 93%   BMI 22.96 kg/m²    Wt Readings from Last 3 Encounters:   03/20/24 72.6 kg (160 lb)   03/08/24 76.2 kg (168 lb)   10/10/23 74.8 kg (165 lb)      24HR INTAKE/OUTPUT:    Intake/Output Summary (Last 24 hours) at 3/22/2024 0822  Last data filed at 3/22/2024 0351  Gross per 24 hour   Intake 2863.33 ml   Output 1600 ml   Net 1263.33 ml       General: alert, in no apparent distress  HEENT: normocephalic, atraumatic, anicteric  Neck: supple, no mass  Lungs: non-labored respirations, clear to auscultation bilaterally  Heart: regular rate and rhythm, no murmurs or rubs  Abdomen: soft, non-tender, non-distended  Ext: no cyanosis, no peripheral edema  Neuro: alert and oriented, no gross abnormalities  Psych: normal mood and affect  Skin: no rash      Electronically signed by Lorenzo Gilbert DO              
Meds: sodium chloride flush, sodium chloride, ondansetron **OR** ondansetron, acetaminophen **OR** acetaminophen, hydrALAZINE, glucose, dextrose bolus **OR** dextrose bolus, glucagon (rDNA), dextrose, labetalol    Labs:   Recent Labs     03/20/24 2000 03/22/24  0502   WBC 8.8 5.5   HGB 11.2* 9.0*   HCT 34.5* 27.8*    165       Recent Labs     03/20/24 2000 03/21/24  0540 03/22/24  0502   * 138 139   K 5.4* 4.8 4.3   CL 97 104 107   CO2 21 18* 19*   BUN 50* 51* 53*   CREATININE 5.87* 5.72* 5.48*   CALCIUM 9.3 8.4* 8.1*       Recent Labs     03/20/24 2000 03/21/24  0540 03/22/24  0502   AST 18 15 12   ALT 23 18 15   BILITOT <0.2 <0.2 <0.2   ALKPHOS 62 53 47       No results for input(s): \"INR\" in the last 72 hours.  No results for input(s): \"CKTOTAL\", \"TROPONINI\" in the last 72 hours.    Urinalysis:   Lab Results   Component Value Date/Time    NITRU Negative 03/21/2024 12:11 PM    WBCUA 3-5 03/21/2024 12:11 PM    BACTERIA Negative 03/21/2024 12:11 PM    RBCUA 20-50 03/21/2024 12:11 PM    BLOODU LARGE 03/21/2024 12:11 PM    SPECGRAV 1.011 03/21/2024 12:11 PM    GLUCOSEU 250 03/21/2024 12:11 PM       Radiology:   Most recent    Chest CT      WITH CONTRAST:No results found for this or any previous visit.       WITHOUT CONTRAST: Results for orders placed during the hospital encounter of 05/24/22    CT CHEST WO CONTRAST    Narrative  INDICATION: 77-year-old male status post trauma presenting with chest pain and cough.    COMPARISON: May 17, 2022..    TECHNIQUE: Multidetector CT imaging of the chest was preformed without administration of intravenous contrast.  Coronal and sagittal reformatted images were obtained.  Automated dose exposure control was used for this exam.      FINDINGS:    The thoracic aorta demonstrates unremarkable contours with no evidence of aneurysmal dilatation or arterial dissection.  The pulmonary vessels demonstrates unremarkable contours.  The cardiac chambers are 
sitter for patient.          
      hydrALAZINE (APRESOLINE) injection 10 mg  10 mg IntraVENous Q6H PRN Borisov, Brittani, APRN - CNP   10 mg at 03/26/24 0045    glucose chewable tablet 16 g  4 tablet Oral PRN Borisov, Brittani, APRN - CNP        dextrose bolus 10% 125 mL  125 mL IntraVENous PRN Borisov, Brittani, APRN - CNP        Or    dextrose bolus 10% 250 mL  250 mL IntraVENous PRN Borisov, Brittani, APRN - CNP        glucagon injection 1 mg  1 mg SubCUTAneous PRN Borisov, Brittani, APRN - CNP        dextrose 10 % infusion   IntraVENous Continuous PRN Borisov, Brittani, APRN - CNP        insulin lispro (HUMALOG) injection vial 0-8 Units  0-8 Units SubCUTAneous TID WC Borisov, Brittani, APRN - CNP   8 Units at 03/25/24 1239    insulin lispro (HUMALOG) injection vial 0-4 Units  0-4 Units SubCUTAneous Nightly Borisov, Brittani, APRN - CNP   4 Units at 03/24/24 2116    aspirin EC tablet 325 mg  325 mg Oral Daily Borisov, Brittani, APRN - CNP   325 mg at 03/26/24 1101    atorvastatin (LIPITOR) tablet 40 mg  40 mg Oral Daily Borisov, Brittani, APRN - CNP   40 mg at 03/25/24 2005    vitamin B-12 (CYANOCOBALAMIN) tablet 500 mcg  500 mcg Oral Daily Borisov, Brittani, APRN - CNP   500 mcg at 03/26/24 1101    tamsulosin (FLOMAX) capsule 0.4 mg  0.4 mg Oral Daily Borisov, Brittani, APRN - CNP   0.4 mg at 03/26/24 1101    amLODIPine (NORVASC) tablet 5 mg  5 mg Oral Daily Bescak, Lorenzo M, DO   5 mg at 03/26/24 1101     Patient has no known allergies.  reviewed      Review of Systems   Constitutional:  Negative for fever.   HENT:  Negative for congestion.    Respiratory:  Negative for apnea.    Genitourinary:  Negative for flank pain and hematuria.   Neurological:  Negative for speech difficulty.         Objective:   Physical Exam  HENT:      Mouth/Throat:      Mouth: Mucous membranes are moist.   Pulmonary:      Effort: Pulmonary effort is normal.   Skin:     General: Skin is warm.   Neurological:      Mental Status: He is alert and oriented to person, place, and time.      
Independent  Sit to Supine: Independent  Scooting: Independent    Transfers  Sit to Stand: Independent  Stand to Sit: Independent    Ambulation  Surface: Level tile  Device: No Device  Assistance: Independent  Quality of Gait: good quality  Distance: 30 feet in room - multiple trials - limited to in room due to isloation status                   Activity Tolerance  Activity Tolerance: Patient tolerated evaluation without incident    Patient Education  Education Given To: Patient;Family  Education Provided: Role of Therapy  Education Method: Verbal  Education Outcome: Verbalized understanding       ASSESSMENT:   Decision Making: Low Complexity  History: high  Exam: low  Clinical Presentation: low    Barriers to Learning: dementia         DISCHARGE RECOMMENDATIONS:  No Skilled PT: At baseline function    Assessment: Pt demonstrated indep mobility. He has hx of alzheimers and has indep apartment at daughters home. Pts daughter present and report pt is at his baseline level and she has no concerns for his mobility. Pt states he feels safe in walking as well  Requires PT Follow-Up: No              Safety Devices  Type of Devices: Nurse notified, Call light within reach, Left in bed           AMPAC (6 CLICK) BASIC MOBILITY  AM-PAC Inpatient Mobility Raw Score : 24     Therapy Time:   Individual   Time In 1115   Time Out 1126   Minutes 11           Jeny Roy, PT, 03/25/24 at 11:31 AM         Definitions for assistance levels  Independent = pt does not require any physical supervision or assistance from another person for activity completion. Device may be needed.  Stand by assistance = pt requires verbal cues or instructions from another person, close to but not touching, to perform the activity  Minimal assistance= pt performs 75% or more of the activity; assistance is required to complete the activity  Moderate assistance= pt performs 50% of the activity; assistance is required to complete the activity  Maximal 
urology following     Influenza  Reports SOB on exertion, dizziness, cough, nausea, vomiting  Viral panel is positive for influenza B and parainfluenza  Supportive care: IVF for hydration, Tylenol  Monitor LA  VS per unit routine  Up with assist, up in chair  CBC in a.m.     Hypomagnesemia  Lab work revealed serum magnesium 1.5  Patient denies chest pain, palpitation  Mg 2 g IV for replacement  On telemetry to monitor for arrhythmia  VS per unit routine     Coronary artery disease  Hx of AAA without rupture, TIA, CVA, PAD, bilateral carotid artery stenosis  Reports intermittent dizziness  VS per unit routine  Up with assist  Aspirin     Type II DM  HbA1c 9.4 on 3/15/2024  Latest blood glucose 216  Accu-Cheks ACHS  On insulin sliding scale coverage  With hypoglycemia treatment protocol  Diabetic and dietary education  Adjust and add medication as needed  CMP in a.m.     Hyperlipidemia  Patient reports intermittent dizziness, headache, and dyspnea on exertion  Denies chest pain  On atorvastatin 40 mg daily  Monitor liver function  CMP in a.m.     VTE Prophylaxis: Heparin SQ    Electronically signed by MELISSA RAM MD on 3/21/2024 at 12:20 PM

## 2024-03-26 NOTE — DISCHARGE SUMMARY
IV and telemetry removed. Discharged instuctions reviewed with patient's daughter. Patient's daughter verbalizes understanding of discharge instructions.  No questions or concerns at this time. Patient leaving unit via wheelchair escorted by facility transport.

## 2024-03-27 ENCOUNTER — TELEPHONE (OUTPATIENT)
Dept: INTERNAL MEDICINE | Age: 79
End: 2024-03-27

## 2024-03-27 ENCOUNTER — OFFICE VISIT (OUTPATIENT)
Dept: INTERNAL MEDICINE | Age: 79
End: 2024-03-27

## 2024-03-27 VITALS
HEIGHT: 70 IN | TEMPERATURE: 97.4 F | SYSTOLIC BLOOD PRESSURE: 158 MMHG | HEART RATE: 71 BPM | WEIGHT: 163 LBS | RESPIRATION RATE: 16 BRPM | DIASTOLIC BLOOD PRESSURE: 72 MMHG | BODY MASS INDEX: 23.34 KG/M2 | OXYGEN SATURATION: 97 %

## 2024-03-27 DIAGNOSIS — R73.9 HYPERGLYCEMIA: ICD-10-CM

## 2024-03-27 DIAGNOSIS — G30.9 ALZHEIMER'S DEMENTIA WITHOUT BEHAVIORAL DISTURBANCE (HCC): ICD-10-CM

## 2024-03-27 DIAGNOSIS — M10.9 ACUTE GOUT OF RIGHT FOOT, UNSPECIFIED CAUSE: ICD-10-CM

## 2024-03-27 DIAGNOSIS — F02.80 ALZHEIMER'S DEMENTIA WITHOUT BEHAVIORAL DISTURBANCE (HCC): ICD-10-CM

## 2024-03-27 DIAGNOSIS — I10 PRIMARY HYPERTENSION: ICD-10-CM

## 2024-03-27 DIAGNOSIS — N17.9 AKI (ACUTE KIDNEY INJURY) (HCC): ICD-10-CM

## 2024-03-27 DIAGNOSIS — Z09 HOSPITAL DISCHARGE FOLLOW-UP: Primary | ICD-10-CM

## 2024-03-27 RX ORDER — PREDNISONE 20 MG/1
40 TABLET ORAL DAILY
Qty: 14 TABLET | Refills: 0 | Status: SHIPPED | OUTPATIENT
Start: 2024-03-27 | End: 2024-04-03

## 2024-03-27 RX ORDER — GLIPIZIDE 5 MG/1
5 TABLET ORAL
Qty: 14 TABLET | Refills: 0 | Status: SHIPPED | OUTPATIENT
Start: 2024-03-27

## 2024-03-27 RX ORDER — AMLODIPINE BESYLATE 5 MG/1
10 TABLET ORAL DAILY
Qty: 180 TABLET | Refills: 0 | Status: SHIPPED | OUTPATIENT
Start: 2024-03-27

## 2024-03-27 ASSESSMENT — ENCOUNTER SYMPTOMS
SORE THROAT: 0
DIARRHEA: 0
RHINORRHEA: 0
SHORTNESS OF BREATH: 0
ABDOMINAL PAIN: 0
WHEEZING: 0
CONSTIPATION: 0
COUGH: 0

## 2024-03-27 NOTE — PROGRESS NOTES
tablet      4. Primary hypertension  amLODIPine (NORVASC) 5 MG tablet      5. DHARMESH (acute kidney injury) (HCC)        6. Alzheimer's dementia without behavioral disturbance (HCC)             Return in about 2 weeks (around 4/10/2024) for HTN and DM2.    Salvatore Casas MD

## 2024-03-27 NOTE — TELEPHONE ENCOUNTER
Can we set him up with a f/u appointment with REGGIE in 2 weeks to check on the diabetes and blood pressure. I made some changes and wanted to make sure that it is working out. I also want to make sure he sees renal.

## 2024-03-27 NOTE — TELEPHONE ENCOUNTER
Care Transitions Initial Follow Up Call    Outreach made within 2 business days of discharge: Yes    Patient: Bernabe Owen Patient : 1945   MRN: [id number]  Reason for Admission: There are no discharge diagnoses documented for the most recent discharge.  Discharge Date: 3/26/24       Spoke with: RUTH    Discharge department/facility: [facility]    TCM Interactive Patient Contact:  Was patient able to fill all prescriptions: Yes  Was patient instructed to bring all medications to the follow-up visit: Yes  Is patient taking all medications as directed in the discharge summary? Yes  Does patient understand their discharge instructions: Yes  Does patient have questions or concerns that need addressed prior to 7-14 day follow up office visit: yes - BOTH FEET ARE RED AND SWOLLEN.    Scheduled appointment with PCP within 7-14 days    Follow Up  Future Appointments   Date Time Provider Department Center   3/29/2024  1:15 PM Jennifer Ramirez PA [facility] Kathryn Dean, MA

## 2024-03-28 LAB
ALBUMIN SERPL-MCNC: 3.71 G/DL (ref 3.75–5.01)
ALPHA1 GLOB SERPL ELPH-MCNC: 0.4 G/DL (ref 0.19–0.46)
ALPHA2 GLOB SERPL ELPH-MCNC: 0.98 G/DL (ref 0.48–1.05)
B-GLOBULIN SERPL ELPH-MCNC: 0.72 G/DL (ref 0.48–1.1)
GAMMA GLOB SERPL ELPH-MCNC: 0.79 G/DL (ref 0.62–1.51)
INTERPRETATION SERPL IFE-IMP: ABNORMAL
MONOCLON BAND OBS SERPL: ABNORMAL
PROT SERPL-MCNC: 6.6 G/DL (ref 6.3–8.2)

## 2024-03-28 NOTE — TELEPHONE ENCOUNTER
Spoke with patient's daughter Denise. She is going to schedule via [employer]. She is also going to schedule with nephrology today, they don't open til 9 AM.

## 2024-03-29 LAB — BM IGG SER QL IF: NEGATIVE

## 2024-04-09 ENCOUNTER — OFFICE VISIT (OUTPATIENT)
Dept: INTERNAL MEDICINE | Age: 79
End: 2024-04-09
Payer: MEDICARE

## 2024-04-09 VITALS
DIASTOLIC BLOOD PRESSURE: 56 MMHG | WEIGHT: 162 LBS | HEART RATE: 41 BPM | HEIGHT: 70 IN | BODY MASS INDEX: 23.19 KG/M2 | SYSTOLIC BLOOD PRESSURE: 110 MMHG | TEMPERATURE: 96.8 F | OXYGEN SATURATION: 98 %

## 2024-04-09 DIAGNOSIS — I95.9 HYPOTENSION, UNSPECIFIED HYPOTENSION TYPE: ICD-10-CM

## 2024-04-09 DIAGNOSIS — R00.1 BRADYCARDIA: ICD-10-CM

## 2024-04-09 DIAGNOSIS — I10 PRIMARY HYPERTENSION: Primary | ICD-10-CM

## 2024-04-09 PROCEDURE — 99214 OFFICE O/P EST MOD 30 MIN: CPT | Performed by: PHYSICIAN ASSISTANT

## 2024-04-09 PROCEDURE — 1123F ACP DISCUSS/DSCN MKR DOCD: CPT | Performed by: PHYSICIAN ASSISTANT

## 2024-04-09 PROCEDURE — 3078F DIAST BP <80 MM HG: CPT | Performed by: PHYSICIAN ASSISTANT

## 2024-04-09 PROCEDURE — 3074F SYST BP LT 130 MM HG: CPT | Performed by: PHYSICIAN ASSISTANT

## 2024-04-09 RX ORDER — AMLODIPINE BESYLATE 5 MG/1
5 TABLET ORAL DAILY
Qty: 90 TABLET | Refills: 0 | Status: SHIPPED | OUTPATIENT
Start: 2024-04-09

## 2024-04-09 NOTE — PROGRESS NOTES
Bernabe Owen (: 1945) is a 78 y.o. male, Established patient, here for evaluation of the following chief complaint(s):  Fatigue (Declining having AWV done at this time.  ) and Hypertension        ASSESSMENT/PLAN:  1. Primary hypertension  2. Hypotension, unspecified hypotension type  3. Bradycardia  - amLODIPine (NORVASC) 5 MG tablet; Take 1 tablet by mouth daily  Dispense: 90 tablet; Refill: 0  - b/p is too low, HR is too low  - will decrease to Norvasc 5 mg  - will monitor at home and let me know if this helps   - will talk to his daughter about this today as well           No follow-ups on file.    SUBJECTIVE/OBJECTIVE:  Fatigue  Associated symptoms include fatigue.   Hypertension          Fatigue  States all his med were change while he was inpatient  He is now feeling tired all the time   Denies CP or SOB       Review of Systems   Constitutional:  Positive for fatigue.   All other systems reviewed and are negative.      Physical Exam  Vitals reviewed.   Constitutional:       Appearance: Normal appearance.   HENT:      Head: Normocephalic.   Cardiovascular:      Rate and Rhythm: Regular rhythm. Bradycardia present.      Heart sounds: Normal heart sounds.   Pulmonary:      Effort: Pulmonary effort is normal.      Breath sounds: Normal breath sounds.   Neurological:      General: No focal deficit present.      Mental Status: He is alert.   Psychiatric:         Mood and Affect: Mood normal.         Behavior: Behavior normal.         Vitals:    24 0808 24 0810   BP: (!) 112/48 (!) 110/56   Pulse: (!) 41    Temp: 96.8 °F (36 °C)    SpO2: 98%    Weight: 73.5 kg (162 lb)    Height: 1.778 m (5' 10\")                  An electronic signature was used to authenticate this note.    --DARCI Rao

## 2024-04-21 ENCOUNTER — PATIENT MESSAGE (OUTPATIENT)
Dept: INTERNAL MEDICINE | Age: 79
End: 2024-04-21

## 2024-04-22 NOTE — DISCHARGE SUMMARY
Rhabdomyolysis 0.7 mg/dL    Alkaline Phosphatase 61 35 - 104 U/L    ALT 94 (H) 0 - 41 U/L    AST 73 (H) 0 - 40 U/L    Globulin 3.0 2.3 - 3.5 g/dL   Procalcitonin   Result Value Ref Range    Procalcitonin 0.23 (H) 0.00 - 0.15 ng/mL   POCT Glucose   Result Value Ref Range    POC Glucose 312 (H) 70 - 99 mg/dl    Performed on ACCU-CHEK    POCT Glucose   Result Value Ref Range    POC Glucose 284 (H) 70 - 99 mg/dl    Performed on ACCU-CHEK    POCT Glucose   Result Value Ref Range    POC Glucose 267 (H) 70 - 99 mg/dl    Performed on ACCU-CHEK    POCT Glucose   Result Value Ref Range    POC Glucose 266 (H) 70 - 99 mg/dl    Performed on ACCU-CHEK    POCT Glucose   Result Value Ref Range    POC Glucose 218 (H) 70 - 99 mg/dl    Performed on ACCU-CHEK    POCT Glucose   Result Value Ref Range    POC Glucose 328 (H) 70 - 99 mg/dl    Performed on ACCU-CHEK    POCT Glucose   Result Value Ref Range    POC Glucose 218 (H) 70 - 99 mg/dl    Performed on ACCU-CHEK    POCT Glucose   Result Value Ref Range    POC Glucose 234 (H) 70 - 99 mg/dl    Performed on ACCU-CHEK    POCT Glucose   Result Value Ref Range    POC Glucose 248 (H) 70 - 99 mg/dl    Performed on ACCU-CHEK    POCT Glucose   Result Value Ref Range    POC Glucose 182 (H) 70 - 99 mg/dl    Performed on ACCU-CHEK    POCT Glucose   Result Value Ref Range    POC Glucose 297 (H) 70 - 99 mg/dl    Performed on ACCU-CHEK    POCT Glucose   Result Value Ref Range    POC Glucose 220 (H) 70 - 99 mg/dl    Performed on ACCU-CHEK    EKG 12 Lead   Result Value Ref Range    Ventricular Rate 84 BPM    Atrial Rate 84 BPM    P-R Interval 162 ms    QRS Duration 84 ms    Q-T Interval 360 ms    QTc Calculation (Bazett) 425 ms    P Axis 37 degrees    R Axis -14 degrees    T Axis 39 degrees   Echo (TTE) complete (with contrast/ bubble/ strain/ 3D PRN)   Result Value Ref Range    IVSd 1.2 (A) 0.6 - 1.0 cm    LVIDd 3.8 (A) 4.2 - 5.9 cm    LVOT Diameter 2.4 cm    LVPWd 1.3 (A) 0.6 - 1.0 cm    LV Ejection Fraction A4C 62

## 2024-04-22 NOTE — TELEPHONE ENCOUNTER
From: Bernabe Owen  To: Jennifer Ramirez  Sent: 4/21/2024 7:11 AM EDT  Subject: Refills    Good morning Dad needs the following refilled: Carvedilol, Tamsulosin, Spironolactone, and Januvia, it [facility]? thank you

## 2024-04-22 NOTE — TELEPHONE ENCOUNTER
Comments:     Last Office Visit (last PCP visit):   4/9/2024    Next Visit Date:  No future appointments.    **If hasn't been seen in over a year OR hasn't followed up according to last diabetes/ADHD visit, make appointment for patient before sending refill to provider.    Rx requested:  Requested Prescriptions     Pending Prescriptions Disp Refills    carvedilol (COREG) 25 MG tablet 180 tablet 1     Sig: Take 1 tablet by mouth 2 times daily (with meals)    tamsulosin (FLOMAX) 0.4 MG capsule 90 capsule 1     Sig: Take 1 capsule by mouth daily    spironolactone (ALDACTONE) 25 MG tablet 90 tablet 1     Sig: Take 1 tablet by mouth daily    SITagliptin (JANUVIA) 25 MG tablet 90 tablet 1     Sig: Take 1 tablet by mouth daily

## 2024-04-23 RX ORDER — TAMSULOSIN HYDROCHLORIDE 0.4 MG/1
0.4 CAPSULE ORAL DAILY
Qty: 90 CAPSULE | Refills: 1 | Status: SHIPPED | OUTPATIENT
Start: 2024-04-23

## 2024-04-23 RX ORDER — CARVEDILOL 25 MG/1
25 TABLET ORAL 2 TIMES DAILY WITH MEALS
Qty: 180 TABLET | Refills: 1 | Status: SHIPPED | OUTPATIENT
Start: 2024-04-23

## 2024-04-23 RX ORDER — SPIRONOLACTONE 25 MG/1
25 TABLET ORAL DAILY
Qty: 90 TABLET | Refills: 1 | Status: SHIPPED | OUTPATIENT
Start: 2024-04-23

## 2024-05-02 DIAGNOSIS — R42 DIZZINESS: ICD-10-CM

## 2024-05-03 RX ORDER — MECLIZINE HYDROCHLORIDE 25 MG/1
TABLET ORAL
Qty: 90 TABLET | Refills: 1 | Status: SHIPPED | OUTPATIENT
Start: 2024-05-03

## 2024-05-03 NOTE — TELEPHONE ENCOUNTER
Comments:     Last Office Visit (last PCP visit):   4/9/2024    Next Visit Date:  No future appointments.    **If hasn't been seen in over a year OR hasn't followed up according to last diabetes/ADHD visit, make appointment for patient before sending refill to provider.    Rx requested:  Requested Prescriptions     Pending Prescriptions Disp Refills    meclizine (ANTIVERT) 25 MG tablet 90 tablet 1     Sig: TAKE 1 TABLET BY MOUTH THREE TIMES DAILY AS NEEDED FOR DIZZINESS AND FOR NAUSEA

## 2024-05-23 ENCOUNTER — OFFICE VISIT (OUTPATIENT)
Dept: INTERNAL MEDICINE | Age: 79
End: 2024-05-23
Payer: MEDICARE

## 2024-05-23 VITALS
HEIGHT: 70 IN | TEMPERATURE: 97.3 F | BODY MASS INDEX: 22.54 KG/M2 | SYSTOLIC BLOOD PRESSURE: 118 MMHG | DIASTOLIC BLOOD PRESSURE: 68 MMHG | HEART RATE: 55 BPM | OXYGEN SATURATION: 98 % | WEIGHT: 157.4 LBS

## 2024-05-23 DIAGNOSIS — R26.0 ATAXIC GAIT: ICD-10-CM

## 2024-05-23 DIAGNOSIS — R53.1 WEAKNESS: ICD-10-CM

## 2024-05-23 DIAGNOSIS — E11.9 TYPE 2 DIABETES MELLITUS WITHOUT COMPLICATION, WITHOUT LONG-TERM CURRENT USE OF INSULIN (HCC): ICD-10-CM

## 2024-05-23 DIAGNOSIS — N17.9 AKI (ACUTE KIDNEY INJURY) (HCC): Primary | ICD-10-CM

## 2024-05-23 DIAGNOSIS — N17.9 AKI (ACUTE KIDNEY INJURY) (HCC): ICD-10-CM

## 2024-05-23 LAB
ANION GAP SERPL CALCULATED.3IONS-SCNC: 14 MEQ/L (ref 9–15)
BUN SERPL-MCNC: 42 MG/DL (ref 8–23)
CALCIUM SERPL-MCNC: 9.8 MG/DL (ref 8.5–9.9)
CHLORIDE SERPL-SCNC: 93 MEQ/L (ref 95–107)
CO2 SERPL-SCNC: 20 MEQ/L (ref 20–31)
CREAT SERPL-MCNC: 2.27 MG/DL (ref 0.7–1.2)
GLUCOSE SERPL-MCNC: 598 MG/DL (ref 70–99)
POTASSIUM SERPL-SCNC: 5.8 MEQ/L (ref 3.4–4.9)
SODIUM SERPL-SCNC: 127 MEQ/L (ref 135–144)

## 2024-05-23 PROCEDURE — 99214 OFFICE O/P EST MOD 30 MIN: CPT | Performed by: PHYSICIAN ASSISTANT

## 2024-05-23 PROCEDURE — 1123F ACP DISCUSS/DSCN MKR DOCD: CPT | Performed by: PHYSICIAN ASSISTANT

## 2024-05-23 PROCEDURE — 3078F DIAST BP <80 MM HG: CPT | Performed by: PHYSICIAN ASSISTANT

## 2024-05-23 PROCEDURE — 3074F SYST BP LT 130 MM HG: CPT | Performed by: PHYSICIAN ASSISTANT

## 2024-05-23 PROCEDURE — 3046F HEMOGLOBIN A1C LEVEL >9.0%: CPT | Performed by: PHYSICIAN ASSISTANT

## 2024-05-23 NOTE — PROGRESS NOTES
breath sounds.   Neurological:      General: No focal deficit present.      Mental Status: He is alert and oriented to person, place, and time.   Psychiatric:         Mood and Affect: Mood normal.         Behavior: Behavior normal. Behavior is cooperative.         Thought Content: Thought content normal.         Vitals:    05/23/24 1056   BP: 118/68   Site: Right Upper Arm   Position: Sitting   Cuff Size: Small Adult   Pulse: 55   Temp: 97.3 °F (36.3 °C)   SpO2: 98%   Weight: 71.4 kg (157 lb 6.4 oz)   Height: 1.778 m (5' 10\")                 An electronic signature was used to authenticate this note.    --DARCI Rao

## 2024-05-24 ENCOUNTER — TELEPHONE (OUTPATIENT)
Dept: FAMILY MEDICINE CLINIC | Age: 79
End: 2024-05-24

## 2024-05-24 NOTE — RESULT ENCOUNTER NOTE
Pt and daughter is aware  He is refusing ED over and over  Daughter states he is not confused so he can still make decisions for himself

## 2024-05-24 NOTE — TELEPHONE ENCOUNTER
Late entry for 5/23/24 @5859 : Received a call from the lab for critical sugar of 598. Called and spoke with pts daughter whom was with him. Pt was sleeping. She stated that his sugar around 7 pm was 420. Plan was that she was going to wake him up and check his sugar again, instructed her to have him go to the ER if the sugar was still high. Explained pt could go into DKA.

## 2024-05-28 RX ORDER — ATORVASTATIN CALCIUM 40 MG/1
TABLET, FILM COATED ORAL
Qty: 90 TABLET | Refills: 0 | Status: SHIPPED | OUTPATIENT
Start: 2024-05-28

## 2024-05-28 NOTE — TELEPHONE ENCOUNTER
Comments:     Last Office Visit (last PCP visit):   5/23/2024    Next Visit Date:  No future appointments.    **If hasn't been seen in over a year OR hasn't followed up according to last diabetes/ADHD visit, make appointment for patient before sending refill to provider.    Rx requested:  Requested Prescriptions     Pending Prescriptions Disp Refills    atorvastatin (LIPITOR) 40 MG tablet [Pharmacy Med Name: Atorvastatin Calcium 40 MG Oral Tablet] 90 tablet 0     Sig: Take 1 tablet by mouth once daily

## 2024-05-29 ENCOUNTER — TELEPHONE (OUTPATIENT)
Dept: FAMILY MEDICINE CLINIC | Age: 79
End: 2024-05-29

## 2024-05-29 DIAGNOSIS — F02.80 ALZHEIMER'S DEMENTIA WITHOUT BEHAVIORAL DISTURBANCE (HCC): Primary | ICD-10-CM

## 2024-05-29 DIAGNOSIS — G30.9 ALZHEIMER'S DEMENTIA WITHOUT BEHAVIORAL DISTURBANCE (HCC): Primary | ICD-10-CM

## 2024-05-29 NOTE — TELEPHONE ENCOUNTER
[facility] called back to check on the status of their request. Informed them of Dr. Casas's response and they stated that since Jennifer Ramirez is a PA, that Dr. Casas would need to sign off on the order anyway which is why they contacted him first. They are not sure what direction to take but is requesting an order for evaluation and admission with the H&P as well be faxed to them as soon as possible.

## 2024-05-29 NOTE — TELEPHONE ENCOUNTER
[facility] called to get an order for an evaluation/admission and a H&P faxed to them for this patient. Fax number: [phone]

## 2024-05-30 NOTE — TELEPHONE ENCOUNTER
I will be doing his paperwork for this, so Im not sure why Dr Casas was bothered with this.    Please forward any paperwork of calls to me..

## 2024-08-28 DIAGNOSIS — I10 PRIMARY HYPERTENSION: ICD-10-CM

## 2024-08-28 NOTE — TELEPHONE ENCOUNTER
Comments:     Last Office Visit (last PCP visit):   3/27/2024    Next Visit Date:  No future appointments.    **If hasn't been seen in over a year OR hasn't followed up according to last diabetes/ADHD visit, make appointment for patient before sending refill to provider.    Rx requested:  Requested Prescriptions     Pending Prescriptions Disp Refills    amLODIPine (NORVASC) 5 MG tablet [Pharmacy Med Name: amLODIPine Besylate 5 MG Oral Tablet] 180 tablet 0     Sig: Take 2 tablets by mouth once daily

## 2024-08-29 RX ORDER — AMLODIPINE BESYLATE 5 MG/1
10 TABLET ORAL DAILY
Qty: 180 TABLET | Refills: 0 | Status: SHIPPED | OUTPATIENT
Start: 2024-08-29

## 2024-10-15 ENCOUNTER — APPOINTMENT (OUTPATIENT)
Dept: RADIOLOGY | Facility: HOSPITAL | Age: 79
End: 2024-10-15
Payer: MEDICARE

## 2024-10-17 ENCOUNTER — APPOINTMENT (OUTPATIENT)
Dept: VASCULAR SURGERY | Facility: CLINIC | Age: 79
End: 2024-10-17
Payer: MEDICARE